# Patient Record
Sex: FEMALE | Race: WHITE | NOT HISPANIC OR LATINO | Employment: UNEMPLOYED | URBAN - NONMETROPOLITAN AREA
[De-identification: names, ages, dates, MRNs, and addresses within clinical notes are randomized per-mention and may not be internally consistent; named-entity substitution may affect disease eponyms.]

---

## 2023-05-22 ENCOUNTER — TELEPHONE (OUTPATIENT)
Dept: PRIMARY CARE | Facility: CLINIC | Age: 68
End: 2023-05-22
Payer: MEDICARE

## 2023-05-22 NOTE — TELEPHONE ENCOUNTER
I would like to do a same day sick visit with her if possible, tell her to please call the office at 9am tomorrow and we can try to get her in with a SDS please.

## 2023-05-22 NOTE — TELEPHONE ENCOUNTER
Alana called with a complaint of stomach pain, she stated that her pain level is at a 4. This started on Friday. She's wondering if you can order tests to be done to make sure everything is okay.   She is scheduled in early July to be seen.

## 2023-05-23 ENCOUNTER — OFFICE VISIT (OUTPATIENT)
Dept: PRIMARY CARE | Facility: CLINIC | Age: 68
End: 2023-05-23
Payer: MEDICARE

## 2023-05-23 VITALS
WEIGHT: 226.2 LBS | OXYGEN SATURATION: 98 % | SYSTOLIC BLOOD PRESSURE: 107 MMHG | HEIGHT: 69 IN | HEART RATE: 89 BPM | BODY MASS INDEX: 33.5 KG/M2 | DIASTOLIC BLOOD PRESSURE: 73 MMHG

## 2023-05-23 DIAGNOSIS — R10.11 RUQ PAIN: ICD-10-CM

## 2023-05-23 DIAGNOSIS — R10.84 GENERALIZED ABDOMINAL PAIN: Primary | ICD-10-CM

## 2023-05-23 PROCEDURE — 1159F MED LIST DOCD IN RCRD: CPT | Performed by: REGISTERED NURSE

## 2023-05-23 PROCEDURE — 3008F BODY MASS INDEX DOCD: CPT | Performed by: REGISTERED NURSE

## 2023-05-23 PROCEDURE — 1160F RVW MEDS BY RX/DR IN RCRD: CPT | Performed by: REGISTERED NURSE

## 2023-05-23 PROCEDURE — 1036F TOBACCO NON-USER: CPT | Performed by: REGISTERED NURSE

## 2023-05-23 PROCEDURE — 99213 OFFICE O/P EST LOW 20 MIN: CPT | Performed by: REGISTERED NURSE

## 2023-05-23 RX ORDER — ATORVASTATIN CALCIUM 20 MG/1
20 TABLET, FILM COATED ORAL DAILY
COMMUNITY
End: 2023-06-30

## 2023-05-23 RX ORDER — OXYCODONE HYDROCHLORIDE 5 MG/1
5 TABLET ORAL EVERY 6 HOURS
COMMUNITY
Start: 2023-01-09 | End: 2023-05-23 | Stop reason: ALTCHOICE

## 2023-05-23 RX ORDER — MELOXICAM 15 MG/1
1 TABLET ORAL DAILY PRN
COMMUNITY
Start: 2022-09-23 | End: 2023-09-25 | Stop reason: ALTCHOICE

## 2023-05-23 RX ORDER — OXYCODONE AND ACETAMINOPHEN 5; 325 MG/1; MG/1
1 TABLET ORAL EVERY 6 HOURS PRN
COMMUNITY
Start: 2023-01-30 | End: 2023-05-23 | Stop reason: ALTCHOICE

## 2023-05-23 RX ORDER — CYCLOBENZAPRINE HCL 10 MG
TABLET ORAL
COMMUNITY
Start: 2022-12-21 | End: 2023-05-23 | Stop reason: SDUPTHER

## 2023-05-23 RX ORDER — OMEPRAZOLE 40 MG/1
40 CAPSULE, DELAYED RELEASE ORAL DAILY
COMMUNITY
End: 2023-09-25 | Stop reason: SDUPTHER

## 2023-05-23 RX ORDER — LISINOPRIL 20 MG/1
20 TABLET ORAL DAILY
COMMUNITY
End: 2023-06-30

## 2023-05-23 RX ORDER — VIT C/E/ZN/COPPR/LUTEIN/ZEAXAN 250MG-90MG
CAPSULE ORAL
COMMUNITY
End: 2023-05-23 | Stop reason: ALTCHOICE

## 2023-05-23 RX ORDER — LANOLIN ALCOHOL/MO/W.PET/CERES
1 CREAM (GRAM) TOPICAL DAILY
COMMUNITY

## 2023-05-23 RX ORDER — IBUPROFEN 200 MG
TABLET ORAL EVERY 6 HOURS PRN
COMMUNITY

## 2023-05-23 RX ORDER — PHENYLEPHRINE HCL 1 %
DROPS NASAL
COMMUNITY
Start: 2023-04-19

## 2023-05-23 RX ORDER — CYCLOBENZAPRINE HCL 5 MG
5 TABLET ORAL 3 TIMES DAILY PRN
COMMUNITY
Start: 2023-01-27 | End: 2023-09-25 | Stop reason: ALTCHOICE

## 2023-05-23 ASSESSMENT — ENCOUNTER SYMPTOMS: ABDOMINAL PAIN: 1

## 2023-05-23 NOTE — PROGRESS NOTES
"Subjective   Patient ID: Alana Ashby is a 67 y.o. female who presents for Abdominal Pain (Pt presents today for abdominal pain; pain started on 5/19, not constant, comes and go; at this time, she states her pain is at a 5; family hx of pancreatitis cancer;  ).    Abdominal Pain       Abdominal Pain: Started on Friday, comes and goes. Intermittent, Pain is 5/10. Family history of pancreatic cancer, she has had her gallbladder removed. Denies issues with BM.   In the morning she does not feel it, during the day it gets worse. She is having upper Right side. She had gallbladder removed here. Mother and brother both passed of pancreatic cancer. Denies pancreatitis. Denies n/v/c/d. She has been eating and drinking okay. Denies fevers or chills. Denies blood in stool.   Has tried tylenol, limiting ibuprofen once in a while. Using heat on back. Tylenol is helping at night.     Review of Systems   Gastrointestinal:  Positive for abdominal pain.     Objective   /73 (BP Location: Right arm, Patient Position: Sitting, BP Cuff Size: Large adult)   Pulse 89   Ht 1.753 m (5' 9\")   Wt 103 kg (226 lb 3.2 oz)   SpO2 98%   BMI 33.40 kg/m²     Physical Exam  Abdominal:      General: Abdomen is flat. Bowel sounds are normal. There is no distension.      Palpations: Abdomen is soft.      Tenderness: There is abdominal tenderness in the right upper quadrant. There is no guarding or rebound. Negative signs include McBurney's sign.         Assessment/Plan        #Abdominal pain   #RUQ pain   Blood work ordered   Ultrasound abdomen   Could be hernia   R/o pancreas issues   Call with worsening symptoms   Will call with results   Continue supportive care     "

## 2023-05-26 ENCOUNTER — LAB (OUTPATIENT)
Dept: LAB | Facility: LAB | Age: 68
End: 2023-05-26
Payer: MEDICARE

## 2023-05-26 DIAGNOSIS — R10.11 RUQ PAIN: ICD-10-CM

## 2023-05-26 DIAGNOSIS — R10.84 GENERALIZED ABDOMINAL PAIN: ICD-10-CM

## 2023-05-26 LAB
ALANINE AMINOTRANSFERASE (SGPT) (U/L) IN SER/PLAS: 29 U/L (ref 7–45)
ALBUMIN (G/DL) IN SER/PLAS: 4.4 G/DL (ref 3.4–5)
ALKALINE PHOSPHATASE (U/L) IN SER/PLAS: 65 U/L (ref 33–136)
AMYLASE (U/L) IN SER/PLAS: 47 U/L (ref 29–103)
ANION GAP IN SER/PLAS: 11 MMOL/L (ref 10–20)
ASPARTATE AMINOTRANSFERASE (SGOT) (U/L) IN SER/PLAS: 27 U/L (ref 9–39)
BASOPHILS (10*3/UL) IN BLOOD BY AUTOMATED COUNT: 0.03 X10E9/L (ref 0–0.1)
BASOPHILS/100 LEUKOCYTES IN BLOOD BY AUTOMATED COUNT: 0.6 % (ref 0–2)
BILIRUBIN TOTAL (MG/DL) IN SER/PLAS: 0.6 MG/DL (ref 0–1.2)
CALCIUM (MG/DL) IN SER/PLAS: 9.4 MG/DL (ref 8.6–10.3)
CARBON DIOXIDE, TOTAL (MMOL/L) IN SER/PLAS: 31 MMOL/L (ref 21–32)
CHLORIDE (MMOL/L) IN SER/PLAS: 102 MMOL/L (ref 98–107)
CREATININE (MG/DL) IN SER/PLAS: 1.12 MG/DL (ref 0.5–1.05)
EOSINOPHILS (10*3/UL) IN BLOOD BY AUTOMATED COUNT: 0.12 X10E9/L (ref 0–0.7)
EOSINOPHILS/100 LEUKOCYTES IN BLOOD BY AUTOMATED COUNT: 2.3 % (ref 0–6)
ERYTHROCYTE DISTRIBUTION WIDTH (RATIO) BY AUTOMATED COUNT: 13 % (ref 11.5–14.5)
ERYTHROCYTE MEAN CORPUSCULAR HEMOGLOBIN CONCENTRATION (G/DL) BY AUTOMATED: 33.4 G/DL (ref 32–36)
ERYTHROCYTE MEAN CORPUSCULAR VOLUME (FL) BY AUTOMATED COUNT: 95 FL (ref 80–100)
ERYTHROCYTES (10*6/UL) IN BLOOD BY AUTOMATED COUNT: 4.41 X10E12/L (ref 4–5.2)
GFR FEMALE: 54 ML/MIN/1.73M2
GLUCOSE (MG/DL) IN SER/PLAS: 102 MG/DL (ref 74–99)
HEMATOCRIT (%) IN BLOOD BY AUTOMATED COUNT: 41.9 % (ref 36–46)
HEMOGLOBIN (G/DL) IN BLOOD: 14 G/DL (ref 12–16)
IMMATURE GRANULOCYTES/100 LEUKOCYTES IN BLOOD BY AUTOMATED COUNT: 0.2 % (ref 0–0.9)
LEUKOCYTES (10*3/UL) IN BLOOD BY AUTOMATED COUNT: 5.3 X10E9/L (ref 4.4–11.3)
LIPASE (U/L) IN SER/PLAS: 72 U/L (ref 9–82)
LYMPHOCYTES (10*3/UL) IN BLOOD BY AUTOMATED COUNT: 1.81 X10E9/L (ref 1.2–4.8)
LYMPHOCYTES/100 LEUKOCYTES IN BLOOD BY AUTOMATED COUNT: 34.2 % (ref 13–44)
MONOCYTES (10*3/UL) IN BLOOD BY AUTOMATED COUNT: 0.43 X10E9/L (ref 0.1–1)
MONOCYTES/100 LEUKOCYTES IN BLOOD BY AUTOMATED COUNT: 8.1 % (ref 2–10)
NEUTROPHILS (10*3/UL) IN BLOOD BY AUTOMATED COUNT: 2.89 X10E9/L (ref 1.2–7.7)
NEUTROPHILS/100 LEUKOCYTES IN BLOOD BY AUTOMATED COUNT: 54.6 % (ref 40–80)
PLATELETS (10*3/UL) IN BLOOD AUTOMATED COUNT: 191 X10E9/L (ref 150–450)
POTASSIUM (MMOL/L) IN SER/PLAS: 4.3 MMOL/L (ref 3.5–5.3)
PROTEIN TOTAL: 6.9 G/DL (ref 6.4–8.2)
SODIUM (MMOL/L) IN SER/PLAS: 140 MMOL/L (ref 136–145)
UREA NITROGEN (MG/DL) IN SER/PLAS: 16 MG/DL (ref 6–23)

## 2023-05-26 PROCEDURE — 82150 ASSAY OF AMYLASE: CPT

## 2023-05-26 PROCEDURE — 83690 ASSAY OF LIPASE: CPT

## 2023-05-26 PROCEDURE — 80053 COMPREHEN METABOLIC PANEL: CPT

## 2023-05-26 PROCEDURE — 85025 COMPLETE CBC W/AUTO DIFF WBC: CPT

## 2023-05-26 PROCEDURE — 36415 COLL VENOUS BLD VENIPUNCTURE: CPT

## 2023-05-30 ENCOUNTER — TELEPHONE (OUTPATIENT)
Dept: PRIMARY CARE | Facility: CLINIC | Age: 68
End: 2023-05-30
Payer: MEDICARE

## 2023-05-30 DIAGNOSIS — R10.9 ABDOMINAL DISCOMFORT: ICD-10-CM

## 2023-05-30 NOTE — TELEPHONE ENCOUNTER
Her ultrasound was normal, she did have some gas that shadowed over the pancrease but otherwise there was nothing else noted. Her blood work was also normal. In my notes I stated it could be a hernia, this could be the cause of her discomfort or it could be hiatal hernia. An EGD would be needed to check for that if she is interested

## 2023-05-30 NOTE — TELEPHONE ENCOUNTER
Alana would like to have EGD done. Referral placed for GI and number given for Kathy Castellanos.

## 2023-05-30 NOTE — TELEPHONE ENCOUNTER
Patient called for US and lab results. Patient requested detailed message if she is unable to answer phone.

## 2023-06-30 DIAGNOSIS — E78.49 OTHER HYPERLIPIDEMIA: ICD-10-CM

## 2023-06-30 DIAGNOSIS — I10 PRIMARY HYPERTENSION: ICD-10-CM

## 2023-06-30 PROBLEM — I49.9 IRREGULAR HEART BEAT: Status: RESOLVED | Noted: 2023-06-30 | Resolved: 2023-06-30

## 2023-06-30 PROBLEM — E78.5 HYPERLIPIDEMIA: Status: ACTIVE | Noted: 2023-06-30

## 2023-06-30 PROBLEM — M47.816 FACET SYNDROME, LUMBAR: Status: ACTIVE | Noted: 2023-06-30

## 2023-06-30 PROBLEM — M53.3 COCCYDYNIA: Status: ACTIVE | Noted: 2023-06-30

## 2023-06-30 PROBLEM — M54.16 LUMBAR RADICULOPATHY: Status: ACTIVE | Noted: 2023-06-30

## 2023-06-30 PROBLEM — M25.569 KNEE PAIN: Status: RESOLVED | Noted: 2023-06-30 | Resolved: 2023-06-30

## 2023-06-30 PROBLEM — M79.646 THUMB PAIN: Status: RESOLVED | Noted: 2023-06-30 | Resolved: 2023-06-30

## 2023-06-30 PROBLEM — R59.1 LYMPHADENOPATHY: Status: ACTIVE | Noted: 2023-06-30

## 2023-06-30 PROBLEM — R10.13 EPIGASTRIC PAIN: Status: RESOLVED | Noted: 2023-06-30 | Resolved: 2023-06-30

## 2023-06-30 PROBLEM — I63.9 OCCIPITAL INFARCTION (MULTI): Status: ACTIVE | Noted: 2023-06-30

## 2023-06-30 PROBLEM — M51.36 NARROWING OF LUMBAR INTERVERTEBRAL DISC SPACE: Status: ACTIVE | Noted: 2023-06-30

## 2023-06-30 PROBLEM — M25.529 ELBOW PAIN: Status: RESOLVED | Noted: 2023-06-30 | Resolved: 2023-06-30

## 2023-06-30 PROBLEM — M43.10 ACQUIRED SPONDYLOLISTHESIS: Status: ACTIVE | Noted: 2023-06-30

## 2023-06-30 PROBLEM — M51.369 NARROWING OF LUMBAR INTERVERTEBRAL DISC SPACE: Status: ACTIVE | Noted: 2023-06-30

## 2023-06-30 PROBLEM — S69.90XA THUMB INJURY: Status: RESOLVED | Noted: 2023-06-30 | Resolved: 2023-06-30

## 2023-06-30 PROBLEM — E55.9 VITAMIN D DEFICIENCY: Status: ACTIVE | Noted: 2023-06-30

## 2023-06-30 PROBLEM — K44.9 HIATAL HERNIA: Status: ACTIVE | Noted: 2023-06-30

## 2023-06-30 PROBLEM — G93.9 BRAIN LESION: Status: ACTIVE | Noted: 2023-06-30

## 2023-06-30 PROBLEM — M48.061 LUMBAR STENOSIS: Status: ACTIVE | Noted: 2023-06-30

## 2023-06-30 PROBLEM — M79.671 RIGHT FOOT PAIN: Status: ACTIVE | Noted: 2023-06-30

## 2023-06-30 PROBLEM — K29.70 IRRITANT GASTRITIS: Status: RESOLVED | Noted: 2023-06-30 | Resolved: 2023-06-30

## 2023-06-30 PROBLEM — R19.00 ABDOMINAL MASS: Status: RESOLVED | Noted: 2023-06-30 | Resolved: 2023-06-30

## 2023-06-30 PROBLEM — K57.30 DIVERTICULOSIS OF COLON: Status: ACTIVE | Noted: 2023-06-30

## 2023-06-30 PROBLEM — Z98.1 STATUS POST LUMBAR SPINAL FUSION: Status: ACTIVE | Noted: 2023-06-30

## 2023-06-30 PROBLEM — R13.14 PHARYNGOESOPHAGEAL DYSPHAGIA: Status: ACTIVE | Noted: 2023-06-30

## 2023-06-30 PROBLEM — N63.0 BREAST MASS: Status: ACTIVE | Noted: 2023-06-30

## 2023-06-30 PROBLEM — K21.9 GERD (GASTROESOPHAGEAL REFLUX DISEASE): Status: ACTIVE | Noted: 2023-06-30

## 2023-06-30 PROBLEM — M47.816 SPONDYLOSIS OF LUMBAR SPINE: Status: ACTIVE | Noted: 2023-06-30

## 2023-06-30 RX ORDER — ATORVASTATIN CALCIUM 20 MG/1
TABLET, FILM COATED ORAL
Qty: 90 TABLET | Refills: 0 | Status: SHIPPED | OUTPATIENT
Start: 2023-06-30 | End: 2023-09-25 | Stop reason: SDUPTHER

## 2023-06-30 RX ORDER — LISINOPRIL 20 MG/1
TABLET ORAL
Qty: 90 TABLET | Refills: 0 | Status: SHIPPED | OUTPATIENT
Start: 2023-06-30 | End: 2023-09-25 | Stop reason: SDUPTHER

## 2023-07-10 ENCOUNTER — APPOINTMENT (OUTPATIENT)
Dept: PRIMARY CARE | Facility: CLINIC | Age: 68
End: 2023-07-10
Payer: MEDICARE

## 2023-09-18 PROBLEM — M53.3 COCCYDYNIA: Status: RESOLVED | Noted: 2023-06-30 | Resolved: 2023-09-18

## 2023-09-18 PROBLEM — N63.0 BREAST MASS: Status: RESOLVED | Noted: 2023-06-30 | Resolved: 2023-09-18

## 2023-09-18 PROBLEM — M47.816 SPONDYLOSIS OF LUMBAR SPINE: Status: RESOLVED | Noted: 2023-06-30 | Resolved: 2023-09-18

## 2023-09-18 PROBLEM — Z98.1 STATUS POST LUMBAR SPINAL FUSION: Status: RESOLVED | Noted: 2023-06-30 | Resolved: 2023-09-18

## 2023-09-18 PROBLEM — M79.671 RIGHT FOOT PAIN: Status: RESOLVED | Noted: 2023-06-30 | Resolved: 2023-09-18

## 2023-09-18 PROBLEM — M48.061 LUMBAR STENOSIS: Status: RESOLVED | Noted: 2023-06-30 | Resolved: 2023-09-18

## 2023-09-18 PROBLEM — M54.16 LUMBAR RADICULOPATHY: Status: RESOLVED | Noted: 2023-06-30 | Resolved: 2023-09-18

## 2023-09-18 NOTE — PROGRESS NOTES
"Alana Ashby is a 67 y.o. female who presents for Medicare Annual Wellness Visit Subsequent (Had back surgery 12/21/22; declining flu shot)    Thumb pain: better lately, never saw ortho about injections. Notes her pain fluctuates with activity level.      Low back pain: She had surgery in Dec 2022, her pain changed but in some ways it is worse. She followed up in June and they have recommended physical therapy and she is doing this at home, as directed by her physical therapist. She has followup in December, she is planning to call to schedule now. She is just taking tylenol to help with nighttime symptoms. She had brain fog on gabapentin. Doesn't recall trying the lyrica.      Epigastric pain: Positional. She has had it for a few years but it has gotten worse in the last 6 mo. Comes and goes. When it comes it doesn't last long. Sometimes she feels a bulge and doesn't feel much discomfort.      HTN: On lisinopril, no SE's.     GERD: Controlled on omeprazole. SHe only takes it prn at this point.      HLD: On simvastatin, she has been out recently.      All other systems have been reviewed and are negative for complaint        Objective   /78 (BP Location: Left arm, Patient Position: Sitting, BP Cuff Size: Adult)   Pulse 79   Ht 1.753 m (5' 9\")   Wt 100 kg (221 lb)   BMI 32.64 kg/m²     Gen: No acute distress, alert and oriented x3, pleasant   HEENT: moist mucous membranes, b/l external auditory canals are clear of debris, TMs within normal limits, no oropharyngeal lesions, eomi, perrla   Neck: thyroid within normal limits, no lymphadenopathy   CV: RRR, normal S1/S2, no murmur   Resp: Clear to auscultation bilaterally, no wheezes or rhonchi appreciated  Abd: soft, nontender, non-distended, no guarding/rigidity, bowel sounds present  Extr: no edema, no calf tenderness  Derm: Skin is warm and dry, no rashes appreciated  Psych: mood is good, affect is congruent, good hygiene, normal speech and eye " contact  Neuro: cranial nerves grossly intact, normal gait    Assessment/Plan     #Thumb pain  check xray  rx sent medrol pack  if no improvement will try meloxicam  if that doesn't help we will set up for injections     #Low back pain:  previously through pain management with injections, diminishing returns  Now more seriously considering surgery  Ordering MRI today, she will try open  We discussed ativan before hand, she will discuss with her      #Epigastric pain  likely ventral hernia  discussed supportive care     #CKD:  Monitoring     #HTN  Controlled on lisinopril     #HLD  Controlled on simvastatin  ordering lipid     #GERD:  on omeprazole prn     HCM:  Mammogram Oct WNL  No need for pap smear due to hysterectomy  C-scope 2020, next due 2025  Declining flu, PNA vaccine

## 2023-09-25 ENCOUNTER — OFFICE VISIT (OUTPATIENT)
Dept: PRIMARY CARE | Facility: CLINIC | Age: 68
End: 2023-09-25
Payer: MEDICARE

## 2023-09-25 VITALS
HEIGHT: 69 IN | BODY MASS INDEX: 32.73 KG/M2 | DIASTOLIC BLOOD PRESSURE: 78 MMHG | HEART RATE: 79 BPM | SYSTOLIC BLOOD PRESSURE: 119 MMHG | WEIGHT: 221 LBS

## 2023-09-25 DIAGNOSIS — I10 PRIMARY HYPERTENSION: ICD-10-CM

## 2023-09-25 DIAGNOSIS — Z12.31 ENCOUNTER FOR SCREENING MAMMOGRAM FOR MALIGNANT NEOPLASM OF BREAST: ICD-10-CM

## 2023-09-25 DIAGNOSIS — E78.49 OTHER HYPERLIPIDEMIA: ICD-10-CM

## 2023-09-25 DIAGNOSIS — K21.9 GASTROESOPHAGEAL REFLUX DISEASE WITHOUT ESOPHAGITIS: Primary | ICD-10-CM

## 2023-09-25 PROCEDURE — 1036F TOBACCO NON-USER: CPT | Performed by: FAMILY MEDICINE

## 2023-09-25 PROCEDURE — 3008F BODY MASS INDEX DOCD: CPT | Performed by: FAMILY MEDICINE

## 2023-09-25 PROCEDURE — 1170F FXNL STATUS ASSESSED: CPT | Performed by: FAMILY MEDICINE

## 2023-09-25 PROCEDURE — G0439 PPPS, SUBSEQ VISIT: HCPCS | Performed by: FAMILY MEDICINE

## 2023-09-25 PROCEDURE — 1159F MED LIST DOCD IN RCRD: CPT | Performed by: FAMILY MEDICINE

## 2023-09-25 PROCEDURE — 1160F RVW MEDS BY RX/DR IN RCRD: CPT | Performed by: FAMILY MEDICINE

## 2023-09-25 PROCEDURE — 3074F SYST BP LT 130 MM HG: CPT | Performed by: FAMILY MEDICINE

## 2023-09-25 PROCEDURE — 3078F DIAST BP <80 MM HG: CPT | Performed by: FAMILY MEDICINE

## 2023-09-25 RX ORDER — ATORVASTATIN CALCIUM 20 MG/1
20 TABLET, FILM COATED ORAL DAILY
Qty: 90 TABLET | Refills: 3 | Status: SHIPPED | OUTPATIENT
Start: 2023-09-25 | End: 2024-09-24

## 2023-09-25 RX ORDER — OMEPRAZOLE 40 MG/1
40 CAPSULE, DELAYED RELEASE ORAL
Qty: 90 CAPSULE | Refills: 3 | Status: SHIPPED | OUTPATIENT
Start: 2023-09-25 | End: 2024-09-24

## 2023-09-25 RX ORDER — LISINOPRIL 20 MG/1
20 TABLET ORAL DAILY
Qty: 90 TABLET | Refills: 3 | Status: SHIPPED | OUTPATIENT
Start: 2023-09-25 | End: 2024-09-24

## 2023-09-25 ASSESSMENT — PATIENT HEALTH QUESTIONNAIRE - PHQ9
SUM OF ALL RESPONSES TO PHQ9 QUESTIONS 1 AND 2: 0
2. FEELING DOWN, DEPRESSED OR HOPELESS: NOT AT ALL
1. LITTLE INTEREST OR PLEASURE IN DOING THINGS: NOT AT ALL

## 2023-09-25 ASSESSMENT — ACTIVITIES OF DAILY LIVING (ADL)
DOING_HOUSEWORK: INDEPENDENT
TAKING_MEDICATION: INDEPENDENT
GROCERY_SHOPPING: INDEPENDENT
MANAGING_FINANCES: INDEPENDENT
DRESSING: INDEPENDENT
BATHING: INDEPENDENT

## 2023-12-06 ENCOUNTER — TRANSCRIBE ORDERS (OUTPATIENT)
Dept: ORTHOPEDIC SURGERY | Facility: HOSPITAL | Age: 68
End: 2023-12-06
Payer: MEDICARE

## 2023-12-06 DIAGNOSIS — M51.36 NARROWING OF LUMBAR INTERVERTEBRAL DISC SPACE: ICD-10-CM

## 2023-12-08 ENCOUNTER — ANCILLARY PROCEDURE (OUTPATIENT)
Dept: RADIOLOGY | Facility: CLINIC | Age: 68
End: 2023-12-08
Payer: MEDICARE

## 2023-12-08 ENCOUNTER — OFFICE VISIT (OUTPATIENT)
Dept: ORTHOPEDIC SURGERY | Facility: CLINIC | Age: 68
End: 2023-12-08
Payer: MEDICARE

## 2023-12-08 DIAGNOSIS — Z98.1 STATUS POST LUMBAR SPINAL FUSION: Primary | ICD-10-CM

## 2023-12-08 DIAGNOSIS — M51.36 NARROWING OF LUMBAR INTERVERTEBRAL DISC SPACE: ICD-10-CM

## 2023-12-08 PROCEDURE — 3008F BODY MASS INDEX DOCD: CPT | Performed by: ORTHOPAEDIC SURGERY

## 2023-12-08 PROCEDURE — 1125F AMNT PAIN NOTED PAIN PRSNT: CPT | Performed by: ORTHOPAEDIC SURGERY

## 2023-12-08 PROCEDURE — 1160F RVW MEDS BY RX/DR IN RCRD: CPT | Performed by: ORTHOPAEDIC SURGERY

## 2023-12-08 PROCEDURE — 72110 X-RAY EXAM L-2 SPINE 4/>VWS: CPT

## 2023-12-08 PROCEDURE — 1159F MED LIST DOCD IN RCRD: CPT | Performed by: ORTHOPAEDIC SURGERY

## 2023-12-08 PROCEDURE — 1036F TOBACCO NON-USER: CPT | Performed by: ORTHOPAEDIC SURGERY

## 2023-12-08 PROCEDURE — 99213 OFFICE O/P EST LOW 20 MIN: CPT | Performed by: ORTHOPAEDIC SURGERY

## 2023-12-08 ASSESSMENT — PAIN - FUNCTIONAL ASSESSMENT: PAIN_FUNCTIONAL_ASSESSMENT: 0-10

## 2023-12-08 ASSESSMENT — PAIN SCALES - GENERAL: PAINLEVEL_OUTOF10: 8

## 2023-12-08 ASSESSMENT — PAIN DESCRIPTION - DESCRIPTORS: DESCRIPTORS: ACHING;SHARP;SHOOTING

## 2023-12-08 NOTE — LETTER
December 11, 2023     Eulalia Greene DO  [address] [facility] [address] [facility]  [place]  [facility] [id number]    Patient: Alana Ashby   YOB: 1955   Date of Visit: 12/8/2023       Dear Dr. Eulalia Greene DO:    Thank you for referring Alana Ashby to me for evaluation. Below are my notes for this consultation.  If you have questions, please do not hesitate to call me. I look forward to following your patient along with you.       Sincerely,     Raul Clements MD      CC: No Recipients  ______________________________________________________________________________________    HPI:Alana Ashby is a 68-year-old woman who comes in 1 year status post her L4-S1 TLIF.  Overall she is doing well.  She does have some axial back pain with activity.  However, she is walking well, and has no radicular or claudication symptoms.      ROS:  Reviewed on EMR and patient intake sheet.    PMH/SH:  Reviewed on EMR and patient intake sheet.    Exam:  Physical Exam    Constitutional: Well appearing; no acute distress  Eyes: pupils are equal and round  Psych: normal affect  Respiratory: non-labored breathing  Cardiovascular: regular rate and rhythm  GI: non-distended abdomen  Musculoskeletal: no pain with range of motion of the hips bilaterally  Neurologic: [4]/5 strength in the lower extremities bilaterally]; [-] straight leg raise; no clonus; negative babinski    Radiology:     X-rays demonstrate satisfactory instrumentation and fusion.  No significant adjacent segment changes    Diagnosis:    Status post lumbar fusion    Assessment and Plan:   68-year-old woman 1 year status post lumbar fusion.  Overall she is doing very well.  At this time I can see her back as needed.  She is grateful for her care.    The patient was in agreement with the plan. At the end of the visit today, the patient felt that all questions had been answered satisfactorily.  The patient was pleased with the visit and very appreciative for the care rendered.     Thank you  very much for the kind referral.  It is a privilege, and a pleasure, to partner with you in the care of your patients.  I would be delighted to assist you with any further consultations as needed.  Please feel free to have your patients refer to my website, [email or web address], for patient education materials regarding treatment options for common spinal conditions.        Raul Cleemnts MD    Chief of Spine Surgery, [facility]  Director of Spine Service, [facility]  , Department of Orthopaedics  [employer]  [address] [place] [address]  [email or web address]  P: [phone]    This note was dictated with voice recognition software.  It has not been proofread for grammatical errors, typographical mistakes or other semantic inconsistencies.

## 2023-12-11 NOTE — PROGRESS NOTES
HPI:Alana Ashby is a 68-year-old woman who comes in 1 year status post her L4-S1 TLIF.  Overall she is doing well.  She does have some axial back pain with activity.  However, she is walking well, and has no radicular or claudication symptoms.      ROS:  Reviewed on EMR and patient intake sheet.    PMH/SH:  Reviewed on EMR and patient intake sheet.    Exam:  Physical Exam    Constitutional: Well appearing; no acute distress  Eyes: pupils are equal and round  Psych: normal affect  Respiratory: non-labored breathing  Cardiovascular: regular rate and rhythm  GI: non-distended abdomen  Musculoskeletal: no pain with range of motion of the hips bilaterally  Neurologic: [4]/5 strength in the lower extremities bilaterally]; [-] straight leg raise; no clonus; negative babinski    Radiology:     X-rays demonstrate satisfactory instrumentation and fusion.  No significant adjacent segment changes    Diagnosis:    Status post lumbar fusion    Assessment and Plan:   68-year-old woman 1 year status post lumbar fusion.  Overall she is doing very well.  At this time I can see her back as needed.  She is grateful for her care.    The patient was in agreement with the plan. At the end of the visit today, the patient felt that all questions had been answered satisfactorily.  The patient was pleased with the visit and very appreciative for the care rendered.     Thank you very much for the kind referral.  It is a privilege, and a pleasure, to partner with you in the care of your patients.  I would be delighted to assist you with any further consultations as needed.  Please feel free to have your patients refer to my website, [email or web address], for patient education materials regarding treatment options for common spinal conditions.        Raul Clements MD    Chief of Spine Surgery, [facility]  Director of Spine Service, [facility]  , Department  of Orthopaedics  [employer]  [address]  [place] [id number]  [email or web address]  P: [phone]    This note was dictated with voice recognition software.  It has not been proofread for grammatical errors, typographical mistakes or other semantic inconsistencies.

## 2023-12-22 ENCOUNTER — APPOINTMENT (OUTPATIENT)
Dept: ORTHOPEDIC SURGERY | Facility: CLINIC | Age: 68
End: 2023-12-22
Payer: MEDICARE

## 2024-03-28 ENCOUNTER — OFFICE VISIT (OUTPATIENT)
Dept: PRIMARY CARE | Facility: CLINIC | Age: 69
End: 2024-03-28
Payer: MEDICARE

## 2024-03-28 VITALS
HEIGHT: 69 IN | BODY MASS INDEX: 34.21 KG/M2 | HEART RATE: 91 BPM | TEMPERATURE: 98.6 F | WEIGHT: 231 LBS | SYSTOLIC BLOOD PRESSURE: 123 MMHG | DIASTOLIC BLOOD PRESSURE: 83 MMHG

## 2024-03-28 DIAGNOSIS — J01.90 ACUTE SINUSITIS, RECURRENCE NOT SPECIFIED, UNSPECIFIED LOCATION: Primary | ICD-10-CM

## 2024-03-28 PROCEDURE — 3074F SYST BP LT 130 MM HG: CPT | Performed by: FAMILY MEDICINE

## 2024-03-28 PROCEDURE — 3079F DIAST BP 80-89 MM HG: CPT | Performed by: FAMILY MEDICINE

## 2024-03-28 PROCEDURE — 99214 OFFICE O/P EST MOD 30 MIN: CPT | Performed by: FAMILY MEDICINE

## 2024-03-28 PROCEDURE — 1036F TOBACCO NON-USER: CPT | Performed by: FAMILY MEDICINE

## 2024-03-28 PROCEDURE — 1159F MED LIST DOCD IN RCRD: CPT | Performed by: FAMILY MEDICINE

## 2024-03-28 RX ORDER — CEPHALEXIN 500 MG/1
500 CAPSULE ORAL 2 TIMES DAILY
Qty: 20 CAPSULE | Refills: 0 | Status: SHIPPED | OUTPATIENT
Start: 2024-03-28 | End: 2024-04-07

## 2024-03-28 RX ORDER — PREDNISONE 20 MG/1
40 TABLET ORAL DAILY
Qty: 10 TABLET | Refills: 0 | Status: SHIPPED | OUTPATIENT
Start: 2024-03-28 | End: 2024-04-02

## 2024-03-28 NOTE — PATIENT INSTRUCTIONS
Spine:  Chaim Jimeenz (- Neuro-spine Surgery) [phone]   Evens Vargas ([facility]) [phone]

## 2024-03-28 NOTE — PROGRESS NOTES
"Alana Ashby is a 68 y.o. female who presents for Sick Visit (Has had a \"cold\" for 2 weeks, states she's had laryngitis for over a week and lost her voice, scratchiness in throat wont go away)    Sore throat: Laryngitis off and on. Ears plugged. +rhinorrhea, +PND. Ear plugging, no pain. Mild \"hacky' cough, just started bringing up mucous today, green in color. No vomiting or diarrhea. She has been taking tylenol and ibuprofen for her pain. Using tea and honey.     Thumb pain: better lately, never saw ortho about injections. Notes her pain fluctuates with activity level.      Low back pain: She had surgery in Dec 2022, her pain changed but in some ways it is worse. She followed up in June and they have recommended physical therapy and she is doing this at home, as directed by her physical therapist. She has followup in December, she is planning to call to schedule now. She is just taking tylenol to help with nighttime symptoms. She had brain fog on gabapentin. Doesn't recall trying the lyrica.      Epigastric pain: Positional. She has had it for a few years but it has gotten worse in the last 6 mo. Comes and goes. When it comes it doesn't last long. Sometimes she feels a bulge and doesn't feel much discomfort.      HTN: On lisinopril, no SE's.     GERD: Controlled on omeprazole. SHe only takes it prn at this point.      HLD: On simvastatin, she has been out recently.      All other systems have been reviewed and are negative for complaint     Objective   /83 (BP Location: Left arm, Patient Position: Sitting, BP Cuff Size: Large adult)   Pulse 91   Temp 37 °C (98.6 °F)   Ht 1.753 m (5' 9\")   Wt 105 kg (231 lb)   BMI 34.11 kg/m²     Gen: No acute distress, alert and oriented x3, pleasant   HEENT: moist mucous membranes, b/l external auditory canals are clear of debris, TMs within normal limits, no oropharyngeal lesions, eomi, perrla, +right TM effusion  Neck: thyroid within normal limits, no lymphadenopathy "   CV: RRR, normal S1/S2, no murmur   Resp: Clear to auscultation bilaterally, no wheezes or rhonchi appreciated  Abd: soft, nontender, non-distended, no guarding/rigidity, bowel sounds present  Extr: no edema, no calf tenderness  Derm: Skin is warm and dry, no rashes appreciated  Psych: mood is good, affect is congruent, good hygiene, normal speech and eye contact  Neuro: cranial nerves grossly intact, normal gait    Assessment/Plan     #Acute sinusitis  Rx sent keflex  Fluids, rest, tylenol prn  Rx sent prednisone burst  Recommended vocal rest    #Thumb pain  check xray  rx sent medrol pack  if no improvement will try meloxicam  if that doesn't help we will set up for injections     #Low back pain:  previously through pain management with injections, diminishing returns  Now more seriously considering surgery  Ordering MRI today, she will try open  We discussed ativan before hand, she will discuss with her      #Epigastric pain  likely ventral hernia  discussed supportive care     #CKD:  Monitoring     #HTN  Controlled on lisinopril     #HLD  Controlled on simvastatin  ordering lipid     #GERD:  on omeprazole prn     HCM:  Mammogram Oct WNL  No need for pap smear due to hysterectomy  C-scope 2020, next due 2025  Declining flu, PNA vaccine

## 2024-05-20 ENCOUNTER — OFFICE VISIT (OUTPATIENT)
Dept: PAIN MEDICINE | Facility: CLINIC | Age: 69
End: 2024-05-20
Payer: MEDICARE

## 2024-05-20 VITALS
HEART RATE: 74 BPM | HEIGHT: 69 IN | RESPIRATION RATE: 20 BRPM | WEIGHT: 231 LBS | DIASTOLIC BLOOD PRESSURE: 79 MMHG | SYSTOLIC BLOOD PRESSURE: 116 MMHG | BODY MASS INDEX: 34.21 KG/M2

## 2024-05-20 DIAGNOSIS — M54.12 CERVICAL RADICULITIS: Primary | ICD-10-CM

## 2024-05-20 DIAGNOSIS — F41.9 ANXIETY: ICD-10-CM

## 2024-05-20 DIAGNOSIS — M96.1 POST LAMINECTOMY SYNDROME: ICD-10-CM

## 2024-05-20 PROCEDURE — 1159F MED LIST DOCD IN RCRD: CPT | Performed by: ANESTHESIOLOGY

## 2024-05-20 PROCEDURE — 1125F AMNT PAIN NOTED PAIN PRSNT: CPT | Performed by: ANESTHESIOLOGY

## 2024-05-20 PROCEDURE — 99214 OFFICE O/P EST MOD 30 MIN: CPT | Performed by: ANESTHESIOLOGY

## 2024-05-20 PROCEDURE — 99204 OFFICE O/P NEW MOD 45 MIN: CPT | Performed by: ANESTHESIOLOGY

## 2024-05-20 PROCEDURE — 1160F RVW MEDS BY RX/DR IN RCRD: CPT | Performed by: ANESTHESIOLOGY

## 2024-05-20 PROCEDURE — 3078F DIAST BP <80 MM HG: CPT | Performed by: ANESTHESIOLOGY

## 2024-05-20 PROCEDURE — 1036F TOBACCO NON-USER: CPT | Performed by: ANESTHESIOLOGY

## 2024-05-20 PROCEDURE — 3074F SYST BP LT 130 MM HG: CPT | Performed by: ANESTHESIOLOGY

## 2024-05-20 RX ORDER — ALPRAZOLAM 0.25 MG/1
0.5 TABLET ORAL
Qty: 2 TABLET | Refills: 0 | Status: SHIPPED | OUTPATIENT
Start: 2024-05-20 | End: 2024-06-06 | Stop reason: ALTCHOICE

## 2024-05-20 ASSESSMENT — PAIN - FUNCTIONAL ASSESSMENT: PAIN_FUNCTIONAL_ASSESSMENT: 0-10

## 2024-05-20 ASSESSMENT — ENCOUNTER SYMPTOMS
PSYCHIATRIC NEGATIVE: 1
ENDOCRINE NEGATIVE: 1
RESPIRATORY NEGATIVE: 1
HEMATOLOGIC/LYMPHATIC NEGATIVE: 1
GASTROINTESTINAL NEGATIVE: 1
EYES NEGATIVE: 1
NUMBNESS: 1
BACK PAIN: 1
CONSTITUTIONAL NEGATIVE: 1
CARDIOVASCULAR NEGATIVE: 1

## 2024-05-20 ASSESSMENT — PATIENT HEALTH QUESTIONNAIRE - PHQ9
1. LITTLE INTEREST OR PLEASURE IN DOING THINGS: NOT AT ALL
SUM OF ALL RESPONSES TO PHQ9 QUESTIONS 1 AND 2: 0
2. FEELING DOWN, DEPRESSED OR HOPELESS: NOT AT ALL

## 2024-05-20 ASSESSMENT — PAIN DESCRIPTION - DESCRIPTORS: DESCRIPTORS: ACHING;NUMBNESS;SHARP;SHOOTING

## 2024-05-20 ASSESSMENT — PAIN SCALES - GENERAL
PAINLEVEL: 4
PAINLEVEL_OUTOF10: 4

## 2024-05-20 ASSESSMENT — LIFESTYLE VARIABLES: TOTAL SCORE: 0

## 2024-05-20 NOTE — PROGRESS NOTES
PAIN MANAGEMENT NEW PATIENT OFFICE NOTE    Date of Service: 5/20/2024    SUBJECTIVE    CHIEF COMPLAINT: LBP    HISTORY OF PRESENT ILLNESS    Alana Ashby is a 68 y.o. female with PMH HTN, GERD, obesity who presents as new patient referred by Eulalia Greene DO with LB pain.    Pt describes yrs of LBP without inciting trauma/incident s/p L4-S1 fusion with Dr Clements in 2022 with initial relief that pt feels has worsened since 1 y ago. Pain worse with standing/walking and better sitting down. Pain assoc with R anterolateral thigh numbness. Pain has been refractive to Tylenol, NSAIDs, >6 w PT, gabapentin. Continues to do home exercises. Saw Dr Clements on 12/8 and no additional surgery was rec'd.    Pt reports neck pain since 2016 after reported slip and fall on black ice outside of a [employer]. Pt has had burning neck pain since. Pain radiates BL to shoulders. Pain is worse with neck motion and better with rest. Occasional R hand numbness. Pt has tried heat, Tylenol, NSAIDs, >6 w home exercise program.     Pt denies new-onset weakness, bowel/bladder incontinence.  Pt denies recent infection, allergy to Latex/iodine/contrast. Patient is currently taking the following blood thinner(s): N/A    REVIEW OF SYSTEMS  Review of Systems   Constitutional: Negative.    HENT: Negative.     Eyes: Negative.    Respiratory: Negative.     Cardiovascular: Negative.    Gastrointestinal: Negative.    Endocrine: Negative.    Musculoskeletal:  Positive for back pain and gait problem.   Skin: Negative.    Neurological:  Positive for numbness.   Hematological: Negative.    Psychiatric/Behavioral: Negative.         PAST MEDICAL HISTORY  Past Medical History:   Diagnosis Date    Abdominal mass 06/30/2023    Elbow pain 06/30/2023    Encounter for screening for malignant neoplasm of colon 03/26/2014    Encounter for screening colonoscopy    Epigastric pain 06/30/2023    Knee pain 06/30/2023    Other specified diseases of gallbladder 06/26/2014     Biliary dyskinesia    Personal history of colonic polyps 04/24/2014    History of adenomatous polyp of colon    Personal history of other diseases of the digestive system     History of esophageal reflux    Personal history of other endocrine, nutritional and metabolic disease     History of hypercholesterolemia    Personal history of other specified conditions 06/04/2014    History of nausea    Right upper quadrant pain 07/10/2014    Abdominal pain, RUQ    Thumb pain 06/30/2023     Past Surgical History:   Procedure Laterality Date    CHOLECYSTECTOMY  07/10/2014    Cholecystectomy Laparoscopic    COLONOSCOPY W/ POLYPECTOMY  04/24/2014    Complete Colonoscopy For Polyp Removal    ESOPHAGOGASTRODUODENOSCOPY  06/04/2014    Diagnostic Esophagogastroduodenoscopy    HYSTERECTOMY  05/13/2013    Hysterectomy    MR HEAD ANGIO WO IV CONTRAST  4/24/2015    MR HEAD ANGIO WO IV CONTRAST 4/24/2015 GEN ANCILLARY LEGACY    MR NECK ANGIO WO IV CONTRAST  4/24/2015    MR NECK ANGIO WO IV CONTRAST 4/24/2015 GEN ANCILLARY LEGACY     No family history on file.    CURRENT MEDICATIONS  Current Outpatient Medications   Medication Sig Dispense Refill    atorvastatin (Lipitor) 20 mg tablet Take 1 tablet (20 mg) by mouth once daily. as directed 90 tablet 3    ibuprofen 200 mg tablet Take by mouth every 6 hours if needed for mild pain (1 - 3).      lisinopril 20 mg tablet Take 1 tablet (20 mg) by mouth once daily. as directed 90 tablet 3    magnesium oxide (Mag-Ox) 400 mg (241.3 mg magnesium) tablet Take 1 tablet (400 mg) by mouth once daily.      omeprazole (PriLOSEC) 40 mg DR capsule Take 1 capsule (40 mg) by mouth once daily in the morning. Take before meals. 90 capsule 3    Pain Reliever, acetaminophen, 500 mg tablet TAKE 1 TABLET BY MOUTH EVERY 4 TO 6 HOURS AS NEEDED       No current facility-administered medications for this visit.       ALLERGIES AND DRUG REACTIONS  Allergies   Allergen Reactions    Baclofen Unknown    Gabapentin  "Other     Memory loss          OBJECTIVE  Visit Vitals  /79   Pulse 74   Resp 20   Ht 1.753 m (5' 9\")   Wt 105 kg (231 lb)   BMI 34.11 kg/m²   Smoking Status Never   BSA 2.26 m²       Last Recorded Pain Score (if available):                Physical Exam  Vitals and nursing note reviewed.     General: Sitting in chair, NAD  Head: NCAT  Eyes: Sclera/conjunctiva clear, EOMI, PERRL  Nose/mouth: MMM  CV: Good distal pulses  Lungs: Good/equal chest excursion  Abdomen: Soft, ND  Ext: No cyanosis/edema  MSK: C-spine alignment: anterior tilt, BL paraspinal m TTP, c-spine ROM: extension, rotation, flexion lmtd by pain with +Spurling. Neg Lhermitte    L-spine: unremarkable alignment, BL paraspinal m TTP with L-spine TTP over surgical area, L-spine ROM: extension/flexion lmtd by pain    Neuro: AAOx3   Dermatome sensation to light touch  LEFT C5: WNL    RIGHT C5: WNL      LEFT C6: WNL       RIGHT C6: WNL      LEFT C7: WNL       RIGHT C7: WNL      LEFT C8: WNL       RIGHT C8: WNL      LEFT T1: WNL       RIGHT T1: WNL    LEFT L1 (lower pelvis/upper thigh): WNL    RIGHT L1: WNL      LEFT L2 (upper thigh): WNL       RIGHT: L2:WNL      LEFT L3 (medial knee): WNL       RIGHT L3: WNL      LEFT L4 (superior medial malleolus): WNL       RIGHT L4: WNL      LEFT L5 (dorsal foot): WNL       RIGHT L5: WNL      LEFT S1 (lateral foot): WNL     RIGHT S1: WNL      LEFT S2 (popliteal fossa): WNL    RIGHT S2: WNL        Motor strength  LEFT C5 (elbow flexion): 5/5   RIGHT C5: 5/5  LEFT C6 (wrist extension): 5/5     RIGHT C6: 5/5  LEFT C7 (elbow extension): 5/5     RIGHT C7: 5/5  LEFT C8 (finger abduction): 5/5     RIGHT C8: 5/5  LEFT T1 (hand ): 5/5     RIGHT T1: 5/5    LEFT L2 (hip flexion): 5/5   RIGHT L2: 5/5  LEFT L3 (knee extension): 5/5     RIGHT L3: 5/5  LEFT L4 (dorsiflexion): 5/5     RIGHT L4: 5/5  LEFT L5 (EHL extension): 5/5     RIGHT L5: 5/5  LEFT S1 (plantarflexion): 5/5     RIGHT S1: 5/5  LEFT S2 (knee flexion): 5/5      " RIGHT S2: 5/5    Special testing  Silva: neg BL  DTR unremarkable  Seated slump test neg BL  Clonus: neg BL  Babinski: neg BL    Psych: affect nl  Skin: no rash/lesions      REVIEW OF LABORATORY DATA  I have reviewed the following lab results:  WBC   Date Value Ref Range Status   05/26/2023 5.3 4.4 - 11.3 x10E9/L Final     RBC   Date Value Ref Range Status   05/26/2023 4.41 4.00 - 5.20 x10E12/L Final     Hemoglobin   Date Value Ref Range Status   05/26/2023 14.0 12.0 - 16.0 g/dL Final     Hematocrit   Date Value Ref Range Status   05/26/2023 41.9 36.0 - 46.0 % Final     MCV   Date Value Ref Range Status   05/26/2023 95 80 - 100 fL Final     MCHC   Date Value Ref Range Status   05/26/2023 33.4 32.0 - 36.0 g/dL Final     RDW   Date Value Ref Range Status   05/26/2023 13.0 11.5 - 14.5 % Final     Platelets   Date Value Ref Range Status   05/26/2023 191 150 - 450 x10E9/L Final     Sodium   Date Value Ref Range Status   05/26/2023 140 136 - 145 mmol/L Final     Potassium   Date Value Ref Range Status   05/26/2023 4.3 3.5 - 5.3 mmol/L Final     Bicarbonate   Date Value Ref Range Status   05/26/2023 31 21 - 32 mmol/L Final     Urea Nitrogen   Date Value Ref Range Status   05/26/2023 16 6 - 23 mg/dL Final     Calcium   Date Value Ref Range Status   05/26/2023 9.4 8.6 - 10.3 mg/dL Final     Protime   Date Value Ref Range Status   12/05/2022 11.9 9.8 - 13.4 sec Final     INR   Date Value Ref Range Status   12/05/2022 1.0 0.9 - 1.1 Final         REVIEW OF RADIOLOGY   I have reviewed the following:  Radiology Studies           XR L-spine 12/78/23:  Laminectomy posterior fusion L4 through S1. Kyphoplasty changes at  the L4 and S1 level. There is mild anterolisthesis L4-L5 and L5 on  S1, similar to the prior. The remainder of the lumbar spine is  unremarkable      IMPRESSION:  Postsurgical changes L4-S1 without evidence of hardware failure or  malalignment.       ASSESSMENT & PLAN  Alana Ashby is a 68 y.o. old female with PMH  HTN, GERD, obesity who presents as new patient referred by Eulalia Greene DO with LB pain     1) LBP  -s/p L4-S1 fusion 2022 worsening since 2023 with subj RLE numbness  -Refractive to  >1 y conservative tx including Tylenol, NSAIDs, >6 w PT, gabapentin  -Saw Dr Clements 12/8 where no additional surgery was rec'd  -MRI L-spine to eval neuraxial pathology. Will order open with Xanax 2/2 anxiety  -F/U 2 w    2) Neck pain  -Since slip and fall 2016 radiating to BL shoulders with +Spurling, occasional R hand numbness  -Refractive to yrs of conservative tx including heat, Tylenol, NSAIDs, >6 w home exercise program  -CT C-spine to eval neuraxial dz                 Lj Delcid MD  Anesthesiologist & Interventional Pain Physician   [facility]  O: [phone]  F: [phone]  9:43 AM  05/20/24

## 2024-05-20 NOTE — LETTER
May 20, 2024     Eulalia Greene DO  [address]  [place] [address]    Patient: Alana Ashby   YOB: 1955   Date of Visit: 5/20/2024       Dear Dr. Eulalia Greene DO:    Thank you for referring Alana Ashby to me for evaluation. Below are my notes for this consultation.  If you have questions, please do not hesitate to call me. I look forward to following your patient along with you.       Sincerely,     Lj Delcid MD      CC: No Recipients  ______________________________________________________________________________________    PAIN MANAGEMENT NEW PATIENT OFFICE NOTE    Date of Service: 5/20/2024    SUBJECTIVE    CHIEF COMPLAINT: LBP    HISTORY OF PRESENT ILLNESS    Alana Ashby is a 68 y.o. female with PMH HTN, GERD, obesity who presents as new patient referred by Eulalia Greene DO with LB pain.    Pt describes yrs of LBP without inciting trauma/incident s/p L4-S1 fusion with Dr Clements in 2022 with initial relief that pt feels has worsened since 1 y ago. Pain worse with standing/walking and better sitting down. Pain assoc with R anterolateral thigh numbness. Pain has been refractive to Tylenol, NSAIDs, >6 w PT, gabapentin. Continues to do home exercises. Saw Dr Clements on 12/8 and no additional surgery was rec'd.    Pt reports neck pain since 2016 after reported slip and fall on black ice outside of a [email or web address]. Pt has had burning neck pain since. Pain radiates BL to shoulders. Pain is worse with neck motion and better with rest. Occasional R hand numbness. Pt has tried heat, Tylenol, NSAIDs, >6 w home exercise program.     Pt denies new-onset weakness, bowel/bladder incontinence.  Pt denies recent infection, allergy to Latex/iodine/contrast. Patient is currently taking the following blood thinner(s): N/A    REVIEW OF SYSTEMS  Review of Systems   Constitutional: Negative.    HENT: Negative.     Eyes: Negative.    Respiratory: Negative.     Cardiovascular: Negative.     Gastrointestinal: Negative.    Endocrine: Negative.    Musculoskeletal:  Positive for back pain and gait problem.   Skin: Negative.    Neurological:  Positive for numbness.   Hematological: Negative.    Psychiatric/Behavioral: Negative.         PAST MEDICAL HISTORY  Past Medical History:   Diagnosis Date   • Abdominal mass 06/30/2023   • Elbow pain 06/30/2023   • Encounter for screening for malignant neoplasm of colon 03/26/2014    Encounter for screening colonoscopy   • Epigastric pain 06/30/2023   • Knee pain 06/30/2023   • Other specified diseases of gallbladder 06/26/2014    Biliary dyskinesia   • Personal history of colonic polyps 04/24/2014    History of adenomatous polyp of colon   • Personal history of other diseases of the digestive system     History of esophageal reflux   • Personal history of other endocrine, nutritional and metabolic disease     History of hypercholesterolemia   • Personal history of other specified conditions 06/04/2014    History of nausea   • Right upper quadrant pain 07/10/2014    Abdominal pain, RUQ   • Thumb pain 06/30/2023     Past Surgical History:   Procedure Laterality Date   • CHOLECYSTECTOMY  07/10/2014    Cholecystectomy Laparoscopic   • COLONOSCOPY W/ POLYPECTOMY  04/24/2014    Complete Colonoscopy For Polyp Removal   • ESOPHAGOGASTRODUODENOSCOPY  06/04/2014    Diagnostic Esophagogastroduodenoscopy   • HYSTERECTOMY  05/13/2013    Hysterectomy   • MR HEAD ANGIO WO IV CONTRAST  4/24/2015    MR HEAD ANGIO WO IV CONTRAST 4/24/2015 [facility]GAC[facility]   • MR NECK ANGIO WO IV CONTRAST  4/24/2015    MR NECK ANGIO WO IV CONTRAST 4/24/2015 [facility]     No family history on file.    CURRENT MEDICATIONS  Current Outpatient Medications   Medication Sig Dispense Refill   • atorvastatin (Lipitor) 20 mg tablet Take 1 tablet (20 mg) by mouth once daily. as directed 90 tablet 3   • ibuprofen 200 mg tablet Take by mouth every 6 hours if needed for mild pain (1 - 3).     •  "lisinopril 20 mg tablet Take 1 tablet (20 mg) by mouth once daily. as directed 90 tablet 3   • magnesium oxide (Mag-Ox) 400 mg (241.3 mg magnesium) tablet Take 1 tablet (400 mg) by mouth once daily.     • omeprazole (PriLOSEC) 40 mg DR capsule Take 1 capsule (40 mg) by mouth once daily in the morning. Take before meals. 90 capsule 3   • Pain Reliever, acetaminophen, 500 mg tablet TAKE 1 TABLET BY MOUTH EVERY 4 TO 6 HOURS AS NEEDED       No current facility-administered medications for this visit.       ALLERGIES AND DRUG REACTIONS  Allergies   Allergen Reactions   • Baclofen Unknown   • Gabapentin Other     Memory loss          OBJECTIVE  Visit Vitals  /79   Pulse 74   Resp 20   Ht 1.753 m (5' 9\")   Wt 105 kg (231 lb)   BMI 34.11 kg/m²   Smoking Status Never   BSA 2.26 m²       Last Recorded Pain Score (if available):                Physical Exam  Vitals and nursing note reviewed.     General: Sitting in chair, NAD  Head: NCAT  Eyes: Sclera/conjunctiva clear, EOMI, PERRL  Nose/mouth: MMM  CV: Good distal pulses  Lungs: Good/equal chest excursion  Abdomen: Soft, ND  Ext: No cyanosis/edema  MSK: C-spine alignment: anterior tilt, BL paraspinal m TTP, c-spine ROM: extension, rotation, flexion lmtd by pain with +Spurling. Neg Lhermitte    L-spine: unremarkable alignment, BL paraspinal m TTP with L-spine TTP over surgical area, L-spine ROM: extension/flexion lmtd by pain    Neuro: AAOx3   Dermatome sensation to light touch  LEFT C5: WNL    RIGHT C5: WNL      LEFT C6: WNL       RIGHT C6: WNL      LEFT C7: WNL       RIGHT C7: WNL      LEFT C8: WNL       RIGHT C8: WNL      LEFT T1: WNL       RIGHT T1: WNL    LEFT L1 (lower pelvis/upper thigh): WNL    RIGHT L1: WNL      LEFT L2 (upper thigh): WNL       RIGHT: L2:WNL      LEFT L3 (medial knee): WNL       RIGHT L3: WNL      LEFT L4 (superior medial malleolus): WNL       RIGHT L4: WNL      LEFT L5 (dorsal foot): WNL       RIGHT L5: WNL      LEFT S1 (lateral foot): WNL     " RIGHT S1: WNL      LEFT S2 (popliteal fossa): WNL    RIGHT S2: WNL        Motor strength  LEFT C5 (elbow flexion): 5/5   RIGHT C5: 5/5  LEFT C6 (wrist extension): 5/5     RIGHT C6: 5/5  LEFT C7 (elbow extension): 5/5     RIGHT C7: 5/5  LEFT C8 (finger abduction): 5/5     RIGHT C8: 5/5  LEFT T1 (hand ): 5/5     RIGHT T1: 5/5    LEFT L2 (hip flexion): 5/5   RIGHT L2: 5/5  LEFT L3 (knee extension): 5/5     RIGHT L3: 5/5  LEFT L4 (dorsiflexion): 5/5     RIGHT L4: 5/5  LEFT L5 (EHL extension): 5/5     RIGHT L5: 5/5  LEFT S1 (plantarflexion): 5/5     RIGHT S1: 5/5  LEFT S2 (knee flexion): 5/5      RIGHT S2: 5/5    Special testing  Silva: neg BL  DTR unremarkable  Seated slump test neg BL  Clonus: neg BL  Babinski: neg BL    Psych: affect nl  Skin: no rash/lesions      REVIEW OF LABORATORY DATA  I have reviewed the following lab results:  WBC   Date Value Ref Range Status   05/26/2023 5.3 4.4 - 11.3 x10E9/L Final     RBC   Date Value Ref Range Status   05/26/2023 4.41 4.00 - 5.20 x10E12/L Final     Hemoglobin   Date Value Ref Range Status   05/26/2023 14.0 12.0 - 16.0 g/dL Final     Hematocrit   Date Value Ref Range Status   05/26/2023 41.9 36.0 - 46.0 % Final     MCV   Date Value Ref Range Status   05/26/2023 95 80 - 100 fL Final     MCHC   Date Value Ref Range Status   05/26/2023 33.4 32.0 - 36.0 g/dL Final     RDW   Date Value Ref Range Status   05/26/2023 13.0 11.5 - 14.5 % Final     Platelets   Date Value Ref Range Status   05/26/2023 191 150 - 450 x10E9/L Final     Sodium   Date Value Ref Range Status   05/26/2023 140 136 - 145 mmol/L Final     Potassium   Date Value Ref Range Status   05/26/2023 4.3 3.5 - 5.3 mmol/L Final     Bicarbonate   Date Value Ref Range Status   05/26/2023 31 21 - 32 mmol/L Final     Urea Nitrogen   Date Value Ref Range Status   05/26/2023 16 6 - 23 mg/dL Final     Calcium   Date Value Ref Range Status   05/26/2023 9.4 8.6 - 10.3 mg/dL Final     Protime   Date Value Ref Range Status    12/05/2022 11.9 9.8 - 13.4 sec Final     INR   Date Value Ref Range Status   12/05/2022 1.0 0.9 - 1.1 Final         REVIEW OF RADIOLOGY   I have reviewed the following:  Radiology Studies           XR L-spine 12/78/23:  Laminectomy posterior fusion L4 through S1. Kyphoplasty changes at  the L4 and S1 level. There is mild anterolisthesis L4-L5 and L5 on  S1, similar to the prior. The remainder of the lumbar spine is  unremarkable      IMPRESSION:  Postsurgical changes L4-S1 without evidence of hardware failure or  malalignment.       ASSESSMENT & PLAN  Alana Ashby is a 68 y.o. old female with PMH HTN, GERD, obesity who presents as new patient referred by Eulalia Greene DO with LB pain     1) LBP  -s/p L4-S1 fusion 2022 worsening since 2023 with subj RLE numbness  -Refractive to  >1 y conservative tx including Tylenol, NSAIDs, >6 w PT, gabapentin  -Saw Dr Clements 12/8 where no additional surgery was rec'd  -MRI L-spine to eval neuraxial pathology. Will order open with Xanax 2/2 anxiety  -F/U 2 w    2) Neck pain  -Since slip and fall 2016 radiating to BL shoulders with +Spurling, occasional R hand numbness  -Refractive to yrs of conservative tx including heat, Tylenol, NSAIDs, >6 w home exercise program  -CT C-spine to eval neuraxial dz                 Lj Delcid MD  Anesthesiologist & Interventional Pain Physician   [facility]  O: [phone]  F: [phone]  9:43 AM  05/20/24

## 2024-05-23 ENCOUNTER — HOSPITAL ENCOUNTER (OUTPATIENT)
Dept: RADIOLOGY | Facility: CLINIC | Age: 69
Discharge: HOME | End: 2024-05-23
Payer: MEDICARE

## 2024-05-23 DIAGNOSIS — M96.1 POST LAMINECTOMY SYNDROME: ICD-10-CM

## 2024-05-23 PROCEDURE — 72148 MRI LUMBAR SPINE W/O DYE: CPT

## 2024-05-23 PROCEDURE — 72148 MRI LUMBAR SPINE W/O DYE: CPT | Performed by: RADIOLOGY

## 2024-05-24 ENCOUNTER — HOSPITAL ENCOUNTER (OUTPATIENT)
Dept: RADIOLOGY | Facility: HOSPITAL | Age: 69
Discharge: HOME | End: 2024-05-24
Payer: MEDICARE

## 2024-05-24 DIAGNOSIS — M54.12 CERVICAL RADICULITIS: ICD-10-CM

## 2024-05-24 PROCEDURE — 72125 CT NECK SPINE W/O DYE: CPT

## 2024-05-24 PROCEDURE — 72125 CT NECK SPINE W/O DYE: CPT | Performed by: RADIOLOGY

## 2024-05-28 ENCOUNTER — TELEPHONE (OUTPATIENT)
Dept: PRIMARY CARE | Facility: CLINIC | Age: 69
End: 2024-05-28
Payer: MEDICARE

## 2024-05-28 DIAGNOSIS — R91.1 LUNG NODULE: ICD-10-CM

## 2024-05-28 DIAGNOSIS — E04.1 THYROID NODULE: Primary | ICD-10-CM

## 2024-05-28 NOTE — TELEPHONE ENCOUNTER
One lung nodule, not particularly concerning but I did order ct chest to followup. Definitely need to follow up on thyroid nodules, I ordered ultrasound for this. Can you let her know?

## 2024-05-30 ENCOUNTER — TELEPHONE (OUTPATIENT)
Dept: PAIN MEDICINE | Facility: CLINIC | Age: 69
End: 2024-05-30
Payer: MEDICARE

## 2024-05-30 ENCOUNTER — HOSPITAL ENCOUNTER (OUTPATIENT)
Dept: RADIOLOGY | Facility: HOSPITAL | Age: 69
Discharge: HOME | End: 2024-05-30
Payer: MEDICARE

## 2024-05-30 DIAGNOSIS — E04.1 THYROID NODULE: ICD-10-CM

## 2024-05-30 PROCEDURE — 76536 US EXAM OF HEAD AND NECK: CPT | Performed by: RADIOLOGY

## 2024-05-30 PROCEDURE — 76536 US EXAM OF HEAD AND NECK: CPT

## 2024-06-05 ENCOUNTER — OFFICE VISIT (OUTPATIENT)
Dept: PAIN MEDICINE | Facility: CLINIC | Age: 69
End: 2024-06-05
Payer: MEDICARE

## 2024-06-05 VITALS
HEART RATE: 81 BPM | SYSTOLIC BLOOD PRESSURE: 116 MMHG | DIASTOLIC BLOOD PRESSURE: 79 MMHG | HEIGHT: 69 IN | WEIGHT: 230 LBS | BODY MASS INDEX: 34.07 KG/M2 | OXYGEN SATURATION: 99 %

## 2024-06-05 DIAGNOSIS — G89.29 CHRONIC LOW BACK PAIN, UNSPECIFIED BACK PAIN LATERALITY, UNSPECIFIED WHETHER SCIATICA PRESENT: Primary | ICD-10-CM

## 2024-06-05 DIAGNOSIS — M54.50 CHRONIC LOW BACK PAIN, UNSPECIFIED BACK PAIN LATERALITY, UNSPECIFIED WHETHER SCIATICA PRESENT: Primary | ICD-10-CM

## 2024-06-05 DIAGNOSIS — M54.12 CERVICAL RADICULITIS: ICD-10-CM

## 2024-06-05 DIAGNOSIS — M54.2 NECK PAIN: ICD-10-CM

## 2024-06-05 DIAGNOSIS — M96.1 POSTLAMINECTOMY SYNDROME OF LUMBAR REGION: ICD-10-CM

## 2024-06-05 PROCEDURE — 3078F DIAST BP <80 MM HG: CPT | Performed by: ANESTHESIOLOGY

## 2024-06-05 PROCEDURE — 99214 OFFICE O/P EST MOD 30 MIN: CPT | Performed by: ANESTHESIOLOGY

## 2024-06-05 PROCEDURE — 1160F RVW MEDS BY RX/DR IN RCRD: CPT | Performed by: ANESTHESIOLOGY

## 2024-06-05 PROCEDURE — 3074F SYST BP LT 130 MM HG: CPT | Performed by: ANESTHESIOLOGY

## 2024-06-05 PROCEDURE — 1159F MED LIST DOCD IN RCRD: CPT | Performed by: ANESTHESIOLOGY

## 2024-06-05 PROCEDURE — 1125F AMNT PAIN NOTED PAIN PRSNT: CPT | Performed by: ANESTHESIOLOGY

## 2024-06-05 ASSESSMENT — ENCOUNTER SYMPTOMS
CARDIOVASCULAR NEGATIVE: 1
PSYCHIATRIC NEGATIVE: 1
ENDOCRINE NEGATIVE: 1
HEMATOLOGIC/LYMPHATIC NEGATIVE: 1
EYES NEGATIVE: 1
BACK PAIN: 1
RESPIRATORY NEGATIVE: 1
CONSTITUTIONAL NEGATIVE: 1
NUMBNESS: 1
GASTROINTESTINAL NEGATIVE: 1

## 2024-06-05 ASSESSMENT — PAIN DESCRIPTION - DESCRIPTORS: DESCRIPTORS: ACHING;SHARP;STABBING

## 2024-06-05 ASSESSMENT — PAIN SCALES - GENERAL
PAINLEVEL: 6
PAINLEVEL_OUTOF10: 6

## 2024-06-05 ASSESSMENT — PATIENT HEALTH QUESTIONNAIRE - PHQ9
2. FEELING DOWN, DEPRESSED OR HOPELESS: NOT AT ALL
SUM OF ALL RESPONSES TO PHQ9 QUESTIONS 1 AND 2: 0
1. LITTLE INTEREST OR PLEASURE IN DOING THINGS: NOT AT ALL

## 2024-06-05 ASSESSMENT — PAIN - FUNCTIONAL ASSESSMENT: PAIN_FUNCTIONAL_ASSESSMENT: 0-10

## 2024-06-05 NOTE — PROGRESS NOTES
PAIN MANAGEMENT FOLLOW-UP OFFICE NOTE    Date of Service: 6/5/2024    SUBJECTIVE    CHIEF COMPLAINT: LBP    HISTORY OF PRESENT ILLNESS    Alana Ashby is a 68 y.o. female with PMH HTN, GERD, obesity who presents for F/U LB pain.    Since her last visit, pt endorses ongoing LBP assoc with RLE numbness. She also claims continued neck pain with R hand numbness. Would like to review MRI results and next steps    Pt denies new-onset weakness, bowel/bladder incontinence.  Pt denies recent infection, allergy to Latex/iodine/contrast. Patient is currently taking the following blood thinner(s): N/A    REVIEW OF SYSTEMS  Review of Systems   Constitutional: Negative.    HENT: Negative.     Eyes: Negative.    Respiratory: Negative.     Cardiovascular: Negative.    Gastrointestinal: Negative.    Endocrine: Negative.    Musculoskeletal:  Positive for back pain and gait problem.   Skin: Negative.    Neurological:  Positive for numbness.   Hematological: Negative.    Psychiatric/Behavioral: Negative.         PAST MEDICAL HISTORY  Past Medical History:   Diagnosis Date    Abdominal mass 06/30/2023    Chronic pain disorder 1970?    Elbow pain 06/30/2023    Encounter for screening for malignant neoplasm of colon 03/26/2014    Encounter for screening colonoscopy    Endometriosis 1991    Epigastric pain 06/30/2023    Knee pain 06/30/2023    Low back pain 1970 ?    Neck pain 2019 ?    Other specified diseases of gallbladder 06/26/2014    Biliary dyskinesia    Personal history of colonic polyps 04/24/2014    History of adenomatous polyp of colon    Personal history of other diseases of the digestive system     History of esophageal reflux    Personal history of other endocrine, nutritional and metabolic disease     History of hypercholesterolemia    Personal history of other specified conditions 06/04/2014    History of nausea    Right upper quadrant pain 07/10/2014    Abdominal pain, RUQ    Thumb pain 06/30/2023     Past Surgical  "History:   Procedure Laterality Date    BACK SURGERY  12/21/2022    CHOLECYSTECTOMY  07/10/2014    Cholecystectomy Laparoscopic    COLONOSCOPY W/ POLYPECTOMY  04/24/2014    Complete Colonoscopy For Polyp Removal    ESOPHAGOGASTRODUODENOSCOPY  06/04/2014    Diagnostic Esophagogastroduodenoscopy    HYSTERECTOMY  05/13/2013    Hysterectomy    MR HEAD ANGIO WO IV CONTRAST  04/24/2015    MR HEAD ANGIO WO IV CONTRAST 4/24/2015 [facility]    MR NECK ANGIO WO IV CONTRAST  04/24/2015    MR NECK ANGIO WO IV CONTRAST 4/24/2015 [facility]    SPINAL FUSION  12/21/2022     Family History   Problem Relation Name Age of Onset    Cancer Mother Janet Young     Hypertension Mother Janet Young     Diabetes Father Jim Young     Stroke Father Jim Young        CURRENT MEDICATIONS  Current Outpatient Medications   Medication Sig Dispense Refill    atorvastatin (Lipitor) 20 mg tablet Take 1 tablet (20 mg) by mouth once daily. as directed 90 tablet 3    ibuprofen 200 mg tablet Take by mouth every 6 hours if needed for mild pain (1 - 3).      lisinopril 20 mg tablet Take 1 tablet (20 mg) by mouth once daily. as directed 90 tablet 3    magnesium oxide (Mag-Ox) 400 mg (241.3 mg magnesium) tablet Take 1 tablet (400 mg) by mouth once daily.      omeprazole (PriLOSEC) 40 mg DR capsule Take 1 capsule (40 mg) by mouth once daily in the morning. Take before meals. 90 capsule 3    Pain Reliever, acetaminophen, 500 mg tablet TAKE 1 TABLET BY MOUTH EVERY 4 TO 6 HOURS AS NEEDED      ALPRAZolam (Xanax) 0.25 mg tablet Take 2 tablets (0.5 mg) by mouth 1 time if needed for anxiety (30 min before MRI) for up to 1 dose. 2 tablet 0     No current facility-administered medications for this visit.       ALLERGIES AND DRUG REACTIONS  Allergies   Allergen Reactions    Baclofen Unknown    Gabapentin Other     Memory loss          OBJECTIVE  Visit Vitals  /79   Pulse 81   Ht 1.753 m (5' 9\")   Wt 104 kg (230 lb)   SpO2 99%   BMI " 33.97 kg/m²   Smoking Status Never   BSA 2.25 m²       Last Recorded Pain Score (if available):                Physical Exam  Vitals and nursing note reviewed.       General: Sitting in chair, NAD  Head: NCAT  Eyes: Sclera/conjunctiva clear, EOMI, PERRL  Nose/mouth: MMM  CV: Good distal pulses  Lungs: Good/equal chest excursion  Abdomen: Soft, ND  Ext: No cyanosis/edema  MSK: C-spine alignment: anterior tilt, BL paraspinal m TTP, c-spine ROM: extension, rotation, flexion lmtd by pain with +Spurling. Neg Lhermitte    L-spine: unremarkable alignment, BL paraspinal m TTP with L-spine TTP over surgical area, L-spine ROM: extension/flexion lmtd by pain    Neuro: AAOx3   Dermatome sensation to light touch  LEFT C5: WNL    RIGHT C5: WNL      LEFT C6: WNL       RIGHT C6: WNL      LEFT C7: WNL       RIGHT C7: WNL      LEFT C8: WNL       RIGHT C8: WNL      LEFT T1: WNL       RIGHT T1: WNL    LEFT L1 (lower pelvis/upper thigh): WNL    RIGHT L1: WNL      LEFT L2 (upper thigh): WNL       RIGHT: L2:WNL      LEFT L3 (medial knee): WNL       RIGHT L3: WNL      LEFT L4 (superior medial malleolus): WNL       RIGHT L4: WNL      LEFT L5 (dorsal foot): WNL       RIGHT L5: WNL      LEFT S1 (lateral foot): WNL     RIGHT S1: WNL      LEFT S2 (popliteal fossa): WNL    RIGHT S2: WNL        Motor strength  LEFT C5 (elbow flexion): 5/5   RIGHT C5: 5/5  LEFT C6 (wrist extension): 5/5     RIGHT C6: 5/5  LEFT C7 (elbow extension): 5/5     RIGHT C7: 5/5  LEFT C8 (finger abduction): 5/5     RIGHT C8: 5/5  LEFT T1 (hand ): 5/5     RIGHT T1: 5/5    LEFT L2 (hip flexion): 5/5   RIGHT L2: 5/5  LEFT L3 (knee extension): 5/5     RIGHT L3: 5/5  LEFT L4 (dorsiflexion): 5/5     RIGHT L4: 5/5  LEFT L5 (EHL extension): 5/5     RIGHT L5: 5/5  LEFT S1 (plantarflexion): 5/5     RIGHT S1: 5/5  LEFT S2 (knee flexion): 5/5      RIGHT S2: 5/5    Special testing  Silva: neg BL  DTR unremarkable      Psych: affect nl  Skin: no rash/lesions      REVIEW OF  LABORATORY DATA  I have reviewed the following lab results:  WBC   Date Value Ref Range Status   05/26/2023 5.3 4.4 - 11.3 x10E9/L Final     RBC   Date Value Ref Range Status   05/26/2023 4.41 4.00 - 5.20 x10E12/L Final     Hemoglobin   Date Value Ref Range Status   05/26/2023 14.0 12.0 - 16.0 g/dL Final     Hematocrit   Date Value Ref Range Status   05/26/2023 41.9 36.0 - 46.0 % Final     MCV   Date Value Ref Range Status   05/26/2023 95 80 - 100 fL Final     MCHC   Date Value Ref Range Status   05/26/2023 33.4 32.0 - 36.0 g/dL Final     RDW   Date Value Ref Range Status   05/26/2023 13.0 11.5 - 14.5 % Final     Platelets   Date Value Ref Range Status   05/26/2023 191 150 - 450 x10E9/L Final     Sodium   Date Value Ref Range Status   05/26/2023 140 136 - 145 mmol/L Final     Potassium   Date Value Ref Range Status   05/26/2023 4.3 3.5 - 5.3 mmol/L Final     Bicarbonate   Date Value Ref Range Status   05/26/2023 31 21 - 32 mmol/L Final     Urea Nitrogen   Date Value Ref Range Status   05/26/2023 16 6 - 23 mg/dL Final     Calcium   Date Value Ref Range Status   05/26/2023 9.4 8.6 - 10.3 mg/dL Final     Protime   Date Value Ref Range Status   12/05/2022 11.9 9.8 - 13.4 sec Final     INR   Date Value Ref Range Status   12/05/2022 1.0 0.9 - 1.1 Final         REVIEW OF RADIOLOGY   I have reviewed the following:  Radiology Studies      CT c-spine 5/24/24:  No acute fracture or posttraumatic subluxation.      C2-3: No significant central canal or foraminal stenosis.  C3-4: No significant central canal or foraminal stenosis.  C4-5: Uncovertebral osteophytes are noted more on the right side  minimally narrowing the right neuroforamen C5-6: Disc osteophyte  complex indents the ventral thecal sac. Uncovertebral osteophytes  moderately narrow the right and minimally narrow the left  neuroforamen. C6-7: No significant central canal or foraminal  stenosis. C7-T1: Hypertrophic facet changes are noted minimally  narrowing the  neuroforamina.      4 mL indistinct nodule right upper lung image 417 series 201 for  instance incompletely evaluated on the current exam and nonspecific.  1.6 cm hypodense nodule left thyroid lobe with chunky calcification  incidentally noted. The thyroid gland is otherwise heterogeneous in  appearance.          IMPRESSION:  Degenerative changes of the cervical spine as above described worst  at C5-6. If further evaluation of the central canal and neuroforamina  were clinically necessary MRI could be considered.          MRI L-spine 5/23/24:  This report assumes 5 non-rib bearing lumbar vertebral bodies. The  lowest intervertebral disc will be labeled L5-S1.      There are new postoperative changes from right laminectomy at L4-L5  with posterior spinal fusion including placement of bilateral  pedicular screws which terminate within the L4 and L5 vertebral  bodies and are interconnected by parallel rods. There are also  intervertebral disc spacers at L4-L5 and L5-S1. Per clinical note,  patient underwent left L5-S1 laminectomy which is partially  visualized. Current study is not tailored to assess the surgical  hardware.      Grade 1 anterolisthesis at L4-L5 and L5-S1 seen on the prior MRI has  decreased. Otherwise, there is no striking spondylolisthesis.  Vertebral body and remaining intervertebral disc heights are  maintained. There are chronic degenerative endplate changes at few  levels including mild endplate scalloping. Marrow signal is overall  within normal limits. There is a hemangioma within the L1 vertebral  body.      The conus medullaris terminates at the level of L1-L2 and is  unremarkable in appearance.      At T12-L1 and L1-L2, there is no posterior disc contour abnormality.  There is no spinal canal stenosis or neural foraminal narrowing.  There is no facet osteoarthropathy.      At L2-L3, there is a stable small diffuse disc bulge. There is no  spinal canal stenosis or neural foraminal narrowing.  There is no  striking facet osteoarthropathy.      At L3-L4, there is a small diffuse disc bulge with a central annular  fissure which causes mild spinal canal stenosis. There is no neural  foraminal narrowing. There is no facet osteoarthropathy.      At L4-L5, there is no posterior disc contour abnormality. There is no  spinal canal stenosis or neural foraminal narrowing. It is somewhat  difficult to evaluate the facet joints due to susceptibility  artifact, noting this, bilateral facet osteoarthropathy has  decreased, likely sequela of prior facetectomy.      At L5-S1, there is no posterior disc contour abnormality. There is no  spinal canal stenosis or striking neural foraminal narrowing. Partial  visualization of left laminectomy. Bilateral facet osteoarthropathy  has decreased, possibly sequela of recent surgery.      IMPRESSION:  1. Postoperative changes from laminectomies and spinal fusion at  L4-L5 and L5-S1 with resolution of spinal canal stenosis L4-L5.      2. Otherwise, essentially stable multilevel degenerative changes as  detailed above.             XR L-spine 12/78/23:  Laminectomy posterior fusion L4 through S1. Kyphoplasty changes at  the L4 and S1 level. There is mild anterolisthesis L4-L5 and L5 on  S1, similar to the prior. The remainder of the lumbar spine is  unremarkable      IMPRESSION:  Postsurgical changes L4-S1 without evidence of hardware failure or  malalignment.       ASSESSMENT & PLAN  Alana Ashby is a 68 y.o. old female with PMH HTN, GERD, obesity who presents for F/U LB pain     1) LBP  -s/p L4-S1 fusion 2022 worsening since 2023 with subj RLE numbness  -Refractive to  >1 y conservative tx including Tylenol, NSAIDs, >6 w PT, gabapentin  -Saw Dr Clements 12/8 where no additional surgery was rec'd  -Reviewed/discussed MRI L-spine 5/23/24: L4-S1 lami/fusion stable, multilevel spondylosis featuring L3-4 disc bulge/fissure contributing to mild stenosis  -Schedule caudal CHRISTIANA w/ IV sed to  target pain generator as seen on imaging and minimize risk/likelihood of chronic opioid use and/or surgery    2) Neck pain  -Since slip and fall 2016 radiating to BL shoulders with +Spurling, occasional R hand numbness  -Refractive to yrs of conservative tx including heat, Tylenol, NSAIDs, >6 w home exercise program  -Reviewed/discussed CT C-spine 5/24/24: multilevel spondylosis featuring C5-6 disc complex indenting dura with moderate R>L NF stenosis  -Schedule C7-T1 CHRISTIANA w/ IV sed to target pain generator as seen on imaging and minimize risk/likelihood of chronic opioid use and/or surgery        Discussed procedure risks/benefits in detail with patient. Pt meets medical necessity for procedure due to failure of conservative measures. Reviewed procedural risks including bleeding, infection, nerve damage, paralysis. Also reviewed mitigating factors such as screening for infection/blood thinner use, sterile precautions, and image-guidance when applicable. All questions answered. Pt/guardian expressed understanding and choose to proceed               Lj Delcid MD  Anesthesiologist & Interventional Pain Physician   [facility]  O: [phone]  F: [phone]  10:30 AM  06/05/24

## 2024-06-06 PROBLEM — M54.2 NECK PAIN: Status: ACTIVE | Noted: 2024-06-06

## 2024-06-07 DIAGNOSIS — E04.2 MULTIPLE THYROID NODULES: Primary | ICD-10-CM

## 2024-06-11 ENCOUNTER — ANCILLARY PROCEDURE (OUTPATIENT)
Dept: RADIOLOGY | Facility: EXTERNAL LOCATION | Age: 69
End: 2024-06-11
Payer: MEDICARE

## 2024-06-11 DIAGNOSIS — M96.1 POSTLAMINECTOMY SYNDROME, NOT ELSEWHERE CLASSIFIED: ICD-10-CM

## 2024-06-11 PROCEDURE — 62323 NJX INTERLAMINAR LMBR/SAC: CPT | Performed by: ANESTHESIOLOGY

## 2024-06-11 PROCEDURE — 99152 MOD SED SAME PHYS/QHP 5/>YRS: CPT | Performed by: ANESTHESIOLOGY

## 2024-06-11 NOTE — PROGRESS NOTES
OPERATIVE NOTE  Preprocedure diagnosis: Lumbar PLS  Postprocedure diagnosis Lumbar PLS    Procedure performed: Caudal CHRISTIANA  Physician: Lj Delcid MD  Anesthesia: Minimal-Moderate IV conscious sedation  Complications: none  Blood loss:  Nil    Clinical note: This is a very pleasant 68 y.o. old female with PMH HTN, GERD, obesity who suffers with LBP here meeting all medical criteria for above-mentioned procedure. Patient's pain stable and persistent from last visit.  Appropriately NPO.  No personal/family hx issues with anesthesia. Denies allergies to Latex, steroids, local anesthetics, or iodine/contrast. Denies being on blood thinners. Not diabetic.  Denies fever, chills, NS, CP, SOB, cough, N/V.    Pt's buttock erythematous. She reports she has been using heating pad copiously. Advised not to using heating pad more than 20 min at a time as not to damage skin. Otherwise, no sign/sx of infxn. Will take care to enter outside of inflamed area as a precaution.    Discussed procedure risks/benefits in detail with patient. Pt meets medical necessity for procedure due to failure of conservative measures. Reviewed procedural risks including bleeding, infection, nerve damage, paralysis. Also reviewed mitigating factors such as screening for infection/blood thinner use, sterile precautions, and image-guidance when applicable. All questions answered. Pt/guardian expressed understanding and choose to proceed      Procedure:  Procedure  Caudal epidural steroid injection    Location   [facility]    Indications  This patient is here today to receive a caudal epidural steroid injection to assist with pain    Procedure Details  The patient was taken to the procedure room and was placed in prone positioning. The sacral area was prepped and draped sterilely in the normal fashion. The skin and subcutaneous tissue was anesthetized with 1% lidocaine. Then, an 18-G Tuohy needle introduced through the sacral hiatus in a lateral  fluoroscopic guidance view and advanced into the AP view. After negative aspiration, a soft-tip epidural catheter was threaded through the Tuohy. After negative aspiration, 3 ml Omnipaque contrast was injected revealing adequate epidural spread without vascular uptake. After negative aspiration, 80 mg triamcinolone + 3 ml preservative free normal saline was easily injected for a total volume of 5 ml.     The patient was, throughout the procedure, monitored by the nursing staff. See nursing record/flowsheets for detailed VS. The patient tolerated the procedure well, was taken to the recovery room, allowed to recover for a sufficient amount of time and then was discharged home in a stable condition. The patient was advised to follow-up in 2 weeks.       Lj Delcid MD  Anesthesiologist & Interventional Pain Physician   [facility] [place]  O: [phone]  F: [phone]  9:01 AM  06/11/24

## 2024-06-12 ENCOUNTER — HOSPITAL ENCOUNTER (OUTPATIENT)
Dept: RADIOLOGY | Facility: HOSPITAL | Age: 69
Discharge: HOME | End: 2024-06-12
Payer: MEDICARE

## 2024-06-12 DIAGNOSIS — R91.1 LUNG NODULE: ICD-10-CM

## 2024-06-12 PROCEDURE — 71250 CT THORAX DX C-: CPT | Performed by: RADIOLOGY

## 2024-06-12 PROCEDURE — 71250 CT THORAX DX C-: CPT

## 2024-06-21 DIAGNOSIS — I15.9 SECONDARY HYPERTENSION: ICD-10-CM

## 2024-06-21 DIAGNOSIS — E04.1 THYROID NODULE: ICD-10-CM

## 2024-06-21 DIAGNOSIS — E78.5 HYPERLIPIDEMIA, UNSPECIFIED HYPERLIPIDEMIA TYPE: ICD-10-CM

## 2024-06-21 DIAGNOSIS — R59.1 LYMPHADENOPATHY: ICD-10-CM

## 2024-06-24 ENCOUNTER — LAB (OUTPATIENT)
Dept: LAB | Facility: LAB | Age: 69
End: 2024-06-24
Payer: MEDICARE

## 2024-06-24 DIAGNOSIS — E04.1 THYROID NODULE: ICD-10-CM

## 2024-06-24 DIAGNOSIS — E78.5 HYPERLIPIDEMIA, UNSPECIFIED HYPERLIPIDEMIA TYPE: ICD-10-CM

## 2024-06-24 DIAGNOSIS — R59.1 LYMPHADENOPATHY: ICD-10-CM

## 2024-06-24 DIAGNOSIS — I15.9 SECONDARY HYPERTENSION: ICD-10-CM

## 2024-06-24 LAB
ALBUMIN SERPL BCP-MCNC: 4.3 G/DL (ref 3.4–5)
ALP SERPL-CCNC: 58 U/L (ref 33–136)
ALT SERPL W P-5'-P-CCNC: 27 U/L (ref 7–45)
ANION GAP SERPL CALC-SCNC: 12 MMOL/L (ref 10–20)
AST SERPL W P-5'-P-CCNC: 23 U/L (ref 9–39)
BASOPHILS # BLD AUTO: 0.06 X10*3/UL (ref 0–0.1)
BASOPHILS NFR BLD AUTO: 0.6 %
BILIRUB SERPL-MCNC: 0.6 MG/DL (ref 0–1.2)
BUN SERPL-MCNC: 28 MG/DL (ref 6–23)
CALCIUM SERPL-MCNC: 9.3 MG/DL (ref 8.6–10.3)
CHLORIDE SERPL-SCNC: 102 MMOL/L (ref 98–107)
CHOLEST SERPL-MCNC: 150 MG/DL (ref 0–199)
CHOLESTEROL/HDL RATIO: 2.8
CO2 SERPL-SCNC: 30 MMOL/L (ref 21–32)
CREAT SERPL-MCNC: 1.33 MG/DL (ref 0.5–1.05)
EGFRCR SERPLBLD CKD-EPI 2021: 44 ML/MIN/1.73M*2
EOSINOPHIL # BLD AUTO: 0.17 X10*3/UL (ref 0–0.7)
EOSINOPHIL NFR BLD AUTO: 1.7 %
ERYTHROCYTE [DISTWIDTH] IN BLOOD BY AUTOMATED COUNT: 12.7 % (ref 11.5–14.5)
GLUCOSE SERPL-MCNC: 82 MG/DL (ref 74–99)
HCT VFR BLD AUTO: 44.1 % (ref 36–46)
HDLC SERPL-MCNC: 53.7 MG/DL
HGB BLD-MCNC: 15 G/DL (ref 12–16)
IMM GRANULOCYTES # BLD AUTO: 0.04 X10*3/UL (ref 0–0.7)
IMM GRANULOCYTES NFR BLD AUTO: 0.4 % (ref 0–0.9)
LDLC SERPL CALC-MCNC: 80 MG/DL
LYMPHOCYTES # BLD AUTO: 2.06 X10*3/UL (ref 1.2–4.8)
LYMPHOCYTES NFR BLD AUTO: 21 %
MCH RBC QN AUTO: 32.5 PG (ref 26–34)
MCHC RBC AUTO-ENTMCNC: 34 G/DL (ref 32–36)
MCV RBC AUTO: 96 FL (ref 80–100)
MONOCYTES # BLD AUTO: 0.68 X10*3/UL (ref 0.1–1)
MONOCYTES NFR BLD AUTO: 6.9 %
NEUTROPHILS # BLD AUTO: 6.79 X10*3/UL (ref 1.2–7.7)
NEUTROPHILS NFR BLD AUTO: 69.4 %
NON HDL CHOLESTEROL: 96 MG/DL (ref 0–149)
NRBC BLD-RTO: 0 /100 WBCS (ref 0–0)
PLATELET # BLD AUTO: 232 X10*3/UL (ref 150–450)
POTASSIUM SERPL-SCNC: 4.5 MMOL/L (ref 3.5–5.3)
PROT SERPL-MCNC: 6.7 G/DL (ref 6.4–8.2)
RBC # BLD AUTO: 4.62 X10*6/UL (ref 4–5.2)
SODIUM SERPL-SCNC: 139 MMOL/L (ref 136–145)
TRIGL SERPL-MCNC: 84 MG/DL (ref 0–149)
TSH SERPL-ACNC: 2.3 MIU/L (ref 0.44–3.98)
VLDL: 17 MG/DL (ref 0–40)
WBC # BLD AUTO: 9.8 X10*3/UL (ref 4.4–11.3)

## 2024-06-24 PROCEDURE — 84443 ASSAY THYROID STIM HORMONE: CPT

## 2024-06-24 PROCEDURE — 85025 COMPLETE CBC W/AUTO DIFF WBC: CPT

## 2024-06-24 PROCEDURE — 36415 COLL VENOUS BLD VENIPUNCTURE: CPT

## 2024-06-24 PROCEDURE — 80061 LIPID PANEL: CPT

## 2024-06-24 PROCEDURE — 80053 COMPREHEN METABOLIC PANEL: CPT

## 2024-06-25 ENCOUNTER — ANCILLARY PROCEDURE (OUTPATIENT)
Dept: RADIOLOGY | Facility: EXTERNAL LOCATION | Age: 69
End: 2024-06-25
Payer: MEDICARE

## 2024-06-25 DIAGNOSIS — M54.12 RADICULOPATHY, CERVICAL REGION: ICD-10-CM

## 2024-06-25 PROCEDURE — 62321 NJX INTERLAMINAR CRV/THRC: CPT | Performed by: ANESTHESIOLOGY

## 2024-06-25 PROCEDURE — 99152 MOD SED SAME PHYS/QHP 5/>YRS: CPT | Performed by: ANESTHESIOLOGY

## 2024-06-25 NOTE — PROGRESS NOTES
OPERATIVE NOTE  Preprocedure diagnosis: Cervical radic  Postprocedure diagnosis Cervical radic    Procedure performed: QUINTIN  Physician: Lj Delcid MD  Anesthesia: Minimal-moderate IV conscious sedation  Complications: none  Blood loss:  Nil    Clinical note: This is a very pleasant 68 y.o. female with PMH HTN, GERD, obesity who suffers with neck pain here meeting all medical criteria for above-mentioned procedure. Patient's pain stable and persistent from last visit.  Appropriately NPO.  No personal/family hx issues with anesthesia. Denies allergies to Latex, steroids, local anesthetics, or iodine/contrast. Denies being on blood thinners. Not diabetic.  Denies fever, chills, NS, CP, SOB, cough, N/V.    Discussed procedure risks/benefits in detail with patient. Pt meets medical necessity for procedure due to failure of conservative measures. Reviewed procedural risks including bleeding, infection, nerve damage, paralysis. Also reviewed mitigating factors such as screening for infection/blood thinner use, sterile precautions, and image-guidance when applicable. All questions answered. Pt/guardian expressed understanding and choose to proceed      Procedure:    Cervical Epidural Procedure Note    Procedure: The risks and benefits of treatment options and alternatives were discussed with the patient, and consent was obtained for a cervical epidural steroid injection. She wishes to proceed. She was placed in a prone position. With fluoroscopic assistance, the C7-T1 interspace was identified. After prep with chlorhexidine and 1% lidocaine local infiltration, an 18- gauge Touhy needle was inserted coaxially. Using cautious loss of resistance to saline technique, firm loss of resistance was appreciated at 7 cm. Aspiration revealed no cerebrospinal fluid, blood, or air.     3 cc of Omnipaque contrast was injected which revealed spread within the epidural space and a normal bilateral epidurogram on fluoroscopy. This  was confirmed with AP and contralateral oblique views. After negative aspiration, 80 mg triamcinolone + 2 ml NS was easily injected for total of 4 ml injectate.    Pt monitored with pulse ox, NIBP. See nursing flowsheet for VS record (and sedation record if applicable)    There were no complications, and the patient tolerated the procedure well. There was no numbness or weakness at the time of discharge.     Pre-procedure VAS: 2/10  Post-procedure VAS: 1/10    She was instructed to await the effects of the steroid over the next several days, to continue with physical therapy as tolerated, and follow up with me 2 weeks.      Lj Delcid MD  Interventional Pain Physician  [facility]  9:44 AM  06/25/24

## 2024-07-08 ENCOUNTER — APPOINTMENT (OUTPATIENT)
Dept: SURGERY | Facility: CLINIC | Age: 69
End: 2024-07-08
Payer: MEDICARE

## 2024-07-08 VITALS
WEIGHT: 217 LBS | BODY MASS INDEX: 32.05 KG/M2 | HEART RATE: 66 BPM | DIASTOLIC BLOOD PRESSURE: 80 MMHG | SYSTOLIC BLOOD PRESSURE: 133 MMHG

## 2024-07-08 DIAGNOSIS — E04.2 MULTIPLE THYROID NODULES: Primary | ICD-10-CM

## 2024-07-08 PROCEDURE — 1160F RVW MEDS BY RX/DR IN RCRD: CPT | Performed by: SURGERY

## 2024-07-08 PROCEDURE — 88112 CYTOPATH CELL ENHANCE TECH: CPT

## 2024-07-08 PROCEDURE — 1036F TOBACCO NON-USER: CPT | Performed by: SURGERY

## 2024-07-08 PROCEDURE — 10005 FNA BX W/US GDN 1ST LES: CPT | Performed by: SURGERY

## 2024-07-08 PROCEDURE — 3079F DIAST BP 80-89 MM HG: CPT | Performed by: SURGERY

## 2024-07-08 PROCEDURE — 1159F MED LIST DOCD IN RCRD: CPT | Performed by: SURGERY

## 2024-07-08 PROCEDURE — 88173 CYTOPATH EVAL FNA REPORT: CPT

## 2024-07-08 PROCEDURE — 99204 OFFICE O/P NEW MOD 45 MIN: CPT | Performed by: SURGERY

## 2024-07-08 PROCEDURE — 3075F SYST BP GE 130 - 139MM HG: CPT | Performed by: SURGERY

## 2024-07-08 ASSESSMENT — ENCOUNTER SYMPTOMS
ENDOCRINE NEGATIVE: 1
CARDIOVASCULAR NEGATIVE: 1
NEUROLOGICAL NEGATIVE: 1
PSYCHIATRIC NEGATIVE: 1
CONSTITUTIONAL NEGATIVE: 1
NECK STIFFNESS: 1
NECK PAIN: 1
RESPIRATORY NEGATIVE: 1

## 2024-07-08 NOTE — PROGRESS NOTES
Subjective   Patient ID: Alana Ashby is a 68 y.o. female who presents for surgical consultation for incidentally discovered multinodular thyroid.    HPI I saw Mrs. Ashby in surgery clinic today.  She had a CT scan done of her cervical spine which had an incidental finding of a 1.6 cm left-sided thyroid nodule.  This prompted a dedicated thyroid ultrasound on May 30, 2024.  I personally reviewed the images which show a multinodular thyroid gland.  She has a definitive 2.1 cm nodule in the left thyroid lobe that is TI-RADS 4 and corresponds to the area seen on her CT scan.  There is a heterogeneous area of tissue in the right thyroid lobe that could be a nodule versus a small conglomerate of nodules.  This area to me is less distinct.  Certainly the area on the left side would meet criteria for biopsy.    She is clinically and biochemically euthyroid with a TSH of 2.3.    She denies any prior history of thyroid disease.  No anterior cervical neck pain.  No difficulties breathing or swallowing no troubles with her voice.  No personal history of head neck or chest radiation exposure.    Family history-her mother took some sort of thyroid medication but no history of thyroid cancer in the family.    Review of Systems   Constitutional: Negative.    HENT: Negative.     Respiratory: Negative.     Cardiovascular: Negative.    Endocrine: Negative.    Musculoskeletal:  Positive for neck pain and neck stiffness.   Neurological: Negative.    Psychiatric/Behavioral: Negative.         Objective   Physical Exam  Vitals reviewed.   Constitutional:       Appearance: Normal appearance. She is obese.   Eyes:      Comments: No proptosis   Neck:      Vascular: No carotid bruit.      Comments: Her thyroid feels fairly normal.  Difficult to palpate or appreciate any nodules on physical exam.  Trachea midline.  Cardiovascular:      Rate and Rhythm: Normal rate and regular rhythm.      Heart sounds: Normal heart sounds.   Pulmonary:       Effort: No respiratory distress.      Breath sounds: Normal breath sounds. No wheezing or rales.   Musculoskeletal:         General: Normal range of motion.   Lymphadenopathy:      Cervical: No cervical adenopathy.   Skin:     General: Skin is warm and dry.   Neurological:      General: No focal deficit present.      Mental Status: She is alert and oriented to person, place, and time.   Psychiatric:         Mood and Affect: Mood normal.         Behavior: Behavior normal.         Narrative & Impression   Interpreted By:  Anibal Garcia,   STUDY:  US THYROID;  5/30/2024 10:28 am      INDICATION:  Signs/Symptoms:suspicious thyroid nodules.      COMPARISON:  CT cervical spine of 05/24/2024. No prior ultrasound available.      ACCESSION NUMBER(S):  [id number]      ORDERING CLINICIAN:  LAKIA EVANGELISTA      TECHNIQUE:  Multiple ultrasonographic images of the thyroid gland and surrounding  tissues were obtained.      FINDINGS:  PARENCHYMA:  Mildly heterogenous background echogenicity.      SIZE:  RIGHT LOBE:  4.9 x 1.9 x 1.9 cm  LEFT LOBE:  4.8 x 1.8 x 1.5 cm  ISTHMUS:  4 mm in AP diameter      NODULES: (Please note, assessment and description of nodules is per  TI-RADS criteria. Up to 4 total nodules described, which includes  largest and/or most clinically significant based on morphology.) It  is noted that some spongiform and/or cystic nodules may not be  specifically described and are TR category 1 (benign).      NODULE #: 1.      Location: Right thyroid lobe mid aspect  Size: 2.6 x 1.7 x 1 cm  Composition:  Solid or almost completely solid (2)  Echogenicity:  Hyperechoic or isoechoic (1)  Shape:  Wider-than-tall (0)  Margin:  Ill-defined (0)  Echogenic Foci:  None or Large comet-tail artifacts (0)      The total score of this nodule is 3 points, corresponding to a  TI-RADS category  3; (3 points) Mildly suspicious.      NODULE #: 2.      Location: Left thyroid lobe mid aspect  Size: 2.1 x 1.8 x 1.1  cm  Composition: Solid or almost completely solid (2)  Echogenicity: Hyperechoic or isoechoic (1)  Shape:  Wider-than-tall (0)  Margin:  Smooth (0)  Echogenic Foci: Macrocalcifications (1)      The total score of this nodule is 4 points, corresponding to a  TI-RADS category 4; (4-6 points) Moderately suspicious.      Additional subcentimeter hypoechoic TI-RADS 4 nodules include a 0.6  cm nodule of the right thyroid lobe and 0.6 cm nodule of the thyroid  isthmus.      IMPRESSION:  1. Right thyroid lobe 2.6 cm TI-RADS 3 nodule.  2. Left thyroid lobe 2.1 cm TI-RADS 4 nodule.     Patient ID: Alana Ashby is a 68 y.o. female.    Biopsy    Date/Time: 7/8/2024 10:38 AM    Performed by: uSndeep Taylor MD  Authorized by: Sundeep Taylor MD    Consent:     Consent obtained:  Verbal    Consent given by:  Patient    Risks, benefits, and alternatives were discussed: yes      Risks discussed:  Bleeding and pain    Alternatives discussed:  No treatment and observation  Universal protocol:     Procedure explained and questions answered to patient or proxy's satisfaction: yes      Relevant documents present and verified: yes      Test results available: yes      Imaging studies available: yes      Required blood products, implants, devices, and special equipment available: yes      Site/side marked: no      Immediately prior to procedure, a time out was called: yes      Patient identity confirmed:  Verbally with patient  Pre-procedure details:     Skin preparation:  Povidone-iodine  Sedation:     Sedation type:  None  Anesthesia:     Anesthesia method:  Local infiltration    Local anesthetic:  Lidocaine 2% w/o epi and lidocaine 1% w/o epi  Procedure specific details:      Ultrasound-guided thyroid biopsy    Prior to the biopsy I did a quick screening ultrasound of the patient's thyroid.  We did confirm a 2 cm hypoechoic TI-RADS 4 nodule with some mild calcification present which is a macrocalcification.  In the right thyroid lobe  I see a more distinct 1.7 x 0.9 cm nodule which would be isoechoic to surrounding thyroid tissue as TI-RADS 3.  This area does not require biopsy.  Therefore, today in the office I did an ultrasound-guided biopsy of the patient's left-sided TI-RADS 4 nodule.  This was done under sterile conditions with 1% lidocaine for pain control.  Needle placement into the nodule was done utilizing real-time ultrasound guidance with a 6-15 MHz linear ultrasound probe.  2 separate samples were taken.  Images were captured.  Patient tolerated the biopsy well.  Post-procedure details:     Procedure completion:  Tolerated well, no immediate complications    Assessment/Plan    Mrs. Ashby had incidental discovery of nodular thyroid disease during a cervical C-spine examination.  She had a left-sided thyroid nodule seen which prompted a dedicated thyroid ultrasound which confirmed a 2.1 cm left-sided TI-RADS 4 thyroid nodule.  Ultrasound from radiology also reported a 2.6 cm mass in the right lobe.  This area to me looks rather heterogeneous.  It could be a conglomeration of nodules versus heterogeneous tissue.  It is much less distinct.  Based on this I told her I would recommend a biopsy starting on the left side today.  If that came back suspicious, given the right-sided appearance of her thyroid we can do a total thyroidectomy for her.  If it comes back benign we can put her in an ultrasound surveillance program and monitor the right side further to decide if it needs a biopsy in the future.    She was amenable to this plan and biopsy was successfully carried out today.    She has no obvious family history for thyroid cancer no personal exposure risk for thyroid cancer no compressive symptoms in her neck.    Plan    1.  I told her I will call her toward the end of the week with biopsy reports.  Any further decision for surgical excision versus ongoing radiographic evaluation will be based on biopsy reports.                 Sundeep FORRESTER  MD [place] 07/08/24 10:18 AM

## 2024-07-08 NOTE — LETTER
July 8, 2024     Eulalia Greene DO  [address]  [place] [address]    Patient: Alana Ashby   YOB: 1955   Date of Visit: 7/8/2024       Dear Dr. Eulalia Greene DO:    Thank you for referring Alana Ashby to me for evaluation. Below are my notes for this consultation.  If you have questions, please do not hesitate to call me. I look forward to following your patient along with you.       Sincerely,     Sundeep Taylor MD      CC: No Recipients  ______________________________________________________________________________________    Subjective  Patient ID: Alana Ashby is a 68 y.o. female who presents for surgical consultation for incidentally discovered multinodular thyroid.    HPI I saw Mrs. Ashby in surgery clinic today.  She had a CT scan done of her cervical spine which had an incidental finding of a 1.6 cm left-sided thyroid nodule.  This prompted a dedicated thyroid ultrasound on May 30, 2024.  I personally reviewed the images which show a multinodular thyroid gland.  She has a definitive 2.1 cm nodule in the left thyroid lobe that is TI-RADS 4 and corresponds to the area seen on her CT scan.  There is a heterogeneous area of tissue in the right thyroid lobe that could be a nodule versus a small conglomerate of nodules.  This area to me is less distinct.  Certainly the area on the left side would meet criteria for biopsy.    She is clinically and biochemically euthyroid with a TSH of 2.3.    She denies any prior history of thyroid disease.  No anterior cervical neck pain.  No difficulties breathing or swallowing no troubles with her voice.  No personal history of head neck or chest radiation exposure.    Family history-her mother took some sort of thyroid medication but no history of thyroid cancer in the family.    Review of Systems   Constitutional: Negative.    HENT: Negative.     Respiratory: Negative.     Cardiovascular: Negative.    Endocrine: Negative.    Musculoskeletal:   Positive for neck pain and neck stiffness.   Neurological: Negative.    Psychiatric/Behavioral: Negative.         Objective  Physical Exam  Vitals reviewed.   Constitutional:       Appearance: Normal appearance. She is obese.   Eyes:      Comments: No proptosis   Neck:      Vascular: No carotid bruit.      Comments: Her thyroid feels fairly normal.  Difficult to palpate or appreciate any nodules on physical exam.  Trachea midline.  Cardiovascular:      Rate and Rhythm: Normal rate and regular rhythm.      Heart sounds: Normal heart sounds.   Pulmonary:      Effort: No respiratory distress.      Breath sounds: Normal breath sounds. No wheezing or rales.   Musculoskeletal:         General: Normal range of motion.   Lymphadenopathy:      Cervical: No cervical adenopathy.   Skin:     General: Skin is warm and dry.   Neurological:      General: No focal deficit present.      Mental Status: She is alert and oriented to person, place, and time.   Psychiatric:         Mood and Affect: Mood normal.         Behavior: Behavior normal.         Narrative & Impression   Interpreted By:  Anibal Garcia,   STUDY:  US THYROID;  5/30/2024 10:28 am      INDICATION:  Signs/Symptoms:suspicious thyroid nodules.      COMPARISON:  CT cervical spine of 05/24/2024. No prior ultrasound available.      ACCESSION NUMBER(S):  [id number]      ORDERING CLINICIAN:  LAKIA EVANGELISTA      TECHNIQUE:  Multiple ultrasonographic images of the thyroid gland and surrounding  tissues were obtained.      FINDINGS:  PARENCHYMA:  Mildly heterogenous background echogenicity.      SIZE:  RIGHT LOBE:  4.9 x 1.9 x 1.9 cm  LEFT LOBE:  4.8 x 1.8 x 1.5 cm  ISTHMUS:  4 mm in AP diameter      NODULES: (Please note, assessment and description of nodules is per  TI-RADS criteria. Up to 4 total nodules described, which includes  largest and/or most clinically significant based on morphology.) It  is noted that some spongiform and/or cystic nodules may not  be  specifically described and are TR category 1 (benign).      NODULE #: 1.      Location: Right thyroid lobe mid aspect  Size: 2.6 x 1.7 x 1 cm  Composition:  Solid or almost completely solid (2)  Echogenicity:  Hyperechoic or isoechoic (1)  Shape:  Wider-than-tall (0)  Margin:  Ill-defined (0)  Echogenic Foci:  None or Large comet-tail artifacts (0)      The total score of this nodule is 3 points, corresponding to a  TI-RADS category  3; (3 points) Mildly suspicious.      NODULE #: 2.      Location: Left thyroid lobe mid aspect  Size: 2.1 x 1.8 x 1.1 cm  Composition: Solid or almost completely solid (2)  Echogenicity: Hyperechoic or isoechoic (1)  Shape:  Wider-than-tall (0)  Margin:  Smooth (0)  Echogenic Foci: Macrocalcifications (1)      The total score of this nodule is 4 points, corresponding to a  TI-RADS category 4; (4-6 points) Moderately suspicious.      Additional subcentimeter hypoechoic TI-RADS 4 nodules include a 0.6  cm nodule of the right thyroid lobe and 0.6 cm nodule of the thyroid  isthmus.      IMPRESSION:  1. Right thyroid lobe 2.6 cm TI-RADS 3 nodule.  2. Left thyroid lobe 2.1 cm TI-RADS 4 nodule.     Patient ID: Alana Ashby is a 68 y.o. female.    Biopsy    Date/Time: 7/8/2024 10:38 AM    Performed by: Sundeep Taylor MD  Authorized by: Sundeep Taylor MD    Consent:     Consent obtained:  Verbal    Consent given by:  Patient    Risks, benefits, and alternatives were discussed: yes      Risks discussed:  Bleeding and pain    Alternatives discussed:  No treatment and observation  Universal protocol:     Procedure explained and questions answered to patient or proxy's satisfaction: yes      Relevant documents present and verified: yes      Test results available: yes      Imaging studies available: yes      Required blood products, implants, devices, and special equipment available: yes      Site/side marked: no      Immediately prior to procedure, a time out was called: yes      Patient  identity confirmed:  Verbally with patient  Pre-procedure details:     Skin preparation:  Povidone-iodine  Sedation:     Sedation type:  None  Anesthesia:     Anesthesia method:  Local infiltration    Local anesthetic:  Lidocaine 2% w/o epi and lidocaine 1% w/o epi  Procedure specific details:      Ultrasound-guided thyroid biopsy    Prior to the biopsy I did a quick screening ultrasound of the patient's thyroid.  We did confirm a 2 cm hypoechoic TI-RADS 4 nodule with some mild calcification present which is a macrocalcification.  In the right thyroid lobe I see a more distinct 1.7 x 0.9 cm nodule which would be isoechoic to surrounding thyroid tissue as TI-RADS 3.  This area does not require biopsy.  Therefore, today in the office I did an ultrasound-guided biopsy of the patient's left-sided TI-RADS 4 nodule.  This was done under sterile conditions with 1% lidocaine for pain control.  Needle placement into the nodule was done utilizing real-time ultrasound guidance with a 6-15 MHz linear ultrasound probe.  2 separate samples were taken.  Images were captured.  Patient tolerated the biopsy well.  Post-procedure details:     Procedure completion:  Tolerated well, no immediate complications    Assessment/Plan   Mrs. Ashby had incidental discovery of nodular thyroid disease during a cervical C-spine examination.  She had a left-sided thyroid nodule seen which prompted a dedicated thyroid ultrasound which confirmed a 2.1 cm left-sided TI-RADS 4 thyroid nodule.  Ultrasound from radiology also reported a 2.6 cm mass in the right lobe.  This area to me looks rather heterogeneous.  It could be a conglomeration of nodules versus heterogeneous tissue.  It is much less distinct.  Based on this I told her I would recommend a biopsy starting on the left side today.  If that came back suspicious, given the right-sided appearance of her thyroid we can do a total thyroidectomy for her.  If it comes back benign we can put her in an  ultrasound surveillance program and monitor the right side further to decide if it needs a biopsy in the future.    She was amenable to this plan and biopsy was successfully carried out today.    She has no obvious family history for thyroid cancer no personal exposure risk for thyroid cancer no compressive symptoms in her neck.    Plan    1.  I told her I will call her toward the end of the week with biopsy reports.  Any further decision for surgical excision versus ongoing radiographic evaluation will be based on biopsy reports.                 Sundeep Taylor MD 07/08/24 10:18 AM

## 2024-07-11 ENCOUNTER — TELEPHONE (OUTPATIENT)
Dept: SURGICAL ONCOLOGY | Facility: HOSPITAL | Age: 69
End: 2024-07-11
Payer: MEDICARE

## 2024-07-11 LAB
LABORATORY COMMENT REPORT: NORMAL
LABORATORY COMMENT REPORT: NORMAL
PATH REPORT.COMMENTS IMP SPEC: NORMAL
PATH REPORT.FINAL DX SPEC: NORMAL
PATH REPORT.GROSS SPEC: NORMAL
PATH REPORT.RELEVANT HX SPEC: NORMAL
PATH REPORT.TOTAL CANCER: NORMAL
RESIDENT REVIEW: NORMAL

## 2024-07-11 NOTE — TELEPHONE ENCOUNTER
Result Communication-I notified patient that her biopsy came back positive for papillary thyroid cancer.  Given her multinodular thyroid gland on ultrasound, I told her I would recommend a total thyroidectomy.  We reviewed the operation.  She said that she is willing to move forward with this.  She would like to have her surgery done at [facility].  I will have my office staff contact her to select a surgical date for total thyroidectomy at the [facility] in August 2024.    Resulted Orders   Cytology Consultation (Non-Gynecologic)   Result Value Ref Range    Case Report       Non-gynecologic Cytology                          Case: [id number]                                   Authorizing Provider:  Sundeep Taylor MD        Collected:           07/08/2024 1052              Ordering Location:     [facility]   Received:            07/08/2024 1052                                     Center                                                                       Pathologist:           Estefania Woodruff MD                                                         Specimen:    THYROID FINE NEEDLE ASPIRATION LEFT MID LOBE                                               Final Cytological Interpretation         A. THYROID FINE NEEDLE ASPIRATION LEFT MID LOBE          Cytologic findings suspicious for papillary thyroid carcinoma.    See comment.                 Slide(s) initially screened by VIVIAN Ruff at [facility] [address] [facility] [id number]  By the signature on this report, the individual or group listed as making the Final Interpretation/Diagnosis certifies that they have reviewed this case.       Resident Review       Cleveland Peck MD        Comment       Molecular testing can be performed on the current specimen on clinician request.  Dr. HALIMA Castro has reviewed the slides and concurs with the diagnosis.      Clinical History        68-year-old  woman with 2.1 cm left-sided thyroid nodule, TI-RADS 4.      Specimen Description       A. THYROID FINE NEEDLE ASPIRATION LEFT MID LOBE.  Received 4 direct smears (2 air-dried Diff-Quik and 2 spray-fixed) and 30 ml red clear needle rinse in Cytolyt with particles.         Specimen Processing Detail       A1 Slides Only (No Block)   A1-1 Diff Quik Stain Smear NGYN   A1-2 Diff Quik Stain Smear NGYN   A1-3 Pap Stain Smear NGYN   A1-4 Pap Stain Smear NGYN   A1-5 Pap Stain NGYN ThinPrep          4:17 PM      Results were successfully communicated with the patient and they acknowledged their understanding.

## 2024-07-25 DIAGNOSIS — Z00.00 HEALTHCARE MAINTENANCE: ICD-10-CM

## 2024-07-25 DIAGNOSIS — R23.3 EASY BRUISING: ICD-10-CM

## 2024-07-25 DIAGNOSIS — E04.2 MULTIPLE THYROID NODULES: ICD-10-CM

## 2024-07-25 DIAGNOSIS — Z01.818 PREOPERATIVE TESTING: ICD-10-CM

## 2024-07-29 DIAGNOSIS — Z12.31 ENCOUNTER FOR SCREENING MAMMOGRAM FOR MALIGNANT NEOPLASM OF BREAST: ICD-10-CM

## 2024-07-30 ENCOUNTER — HOSPITAL ENCOUNTER (OUTPATIENT)
Dept: CARDIOLOGY | Facility: HOSPITAL | Age: 69
Discharge: HOME | End: 2024-07-30
Payer: MEDICARE

## 2024-07-30 ENCOUNTER — LAB (OUTPATIENT)
Dept: LAB | Facility: LAB | Age: 69
End: 2024-07-30
Payer: MEDICARE

## 2024-07-30 DIAGNOSIS — Z01.818 PREOPERATIVE TESTING: ICD-10-CM

## 2024-07-30 DIAGNOSIS — Z00.00 HEALTHCARE MAINTENANCE: ICD-10-CM

## 2024-07-30 DIAGNOSIS — R23.3 EASY BRUISING: ICD-10-CM

## 2024-07-30 DIAGNOSIS — E04.2 MULTIPLE THYROID NODULES: ICD-10-CM

## 2024-07-30 LAB
ALBUMIN SERPL BCP-MCNC: 3.9 G/DL (ref 3.4–5)
ALP SERPL-CCNC: 63 U/L (ref 33–136)
ALT SERPL W P-5'-P-CCNC: 20 U/L (ref 7–45)
ANION GAP SERPL CALC-SCNC: 11 MMOL/L (ref 10–20)
APTT PPP: 31 SECONDS (ref 27–38)
AST SERPL W P-5'-P-CCNC: 17 U/L (ref 9–39)
ATRIAL RATE: 87 BPM
BILIRUB SERPL-MCNC: 0.6 MG/DL (ref 0–1.2)
BUN SERPL-MCNC: 17 MG/DL (ref 6–23)
CALCIUM SERPL-MCNC: 9 MG/DL (ref 8.6–10.3)
CHLORIDE SERPL-SCNC: 103 MMOL/L (ref 98–107)
CO2 SERPL-SCNC: 29 MMOL/L (ref 21–32)
CREAT SERPL-MCNC: 1.02 MG/DL (ref 0.5–1.05)
EGFRCR SERPLBLD CKD-EPI 2021: 60 ML/MIN/1.73M*2
ERYTHROCYTE [DISTWIDTH] IN BLOOD BY AUTOMATED COUNT: 13 % (ref 11.5–14.5)
GLUCOSE SERPL-MCNC: 96 MG/DL (ref 74–99)
HCT VFR BLD AUTO: 38.2 % (ref 36–46)
HGB BLD-MCNC: 12.8 G/DL (ref 12–16)
INR PPP: 1 (ref 0.9–1.1)
MCH RBC QN AUTO: 32.4 PG (ref 26–34)
MCHC RBC AUTO-ENTMCNC: 33.5 G/DL (ref 32–36)
MCV RBC AUTO: 97 FL (ref 80–100)
NRBC BLD-RTO: 0 /100 WBCS (ref 0–0)
P AXIS: 59 DEGREES
P OFFSET: 197 MS
P ONSET: 147 MS
PLATELET # BLD AUTO: 205 X10*3/UL (ref 150–450)
POTASSIUM SERPL-SCNC: 4.2 MMOL/L (ref 3.5–5.3)
PR INTERVAL: 166 MS
PROT SERPL-MCNC: 6.3 G/DL (ref 6.4–8.2)
PROTHROMBIN TIME: 11.3 SECONDS (ref 9.8–12.8)
Q ONSET: 230 MS
QRS COUNT: 14 BEATS
QRS DURATION: 70 MS
QT INTERVAL: 348 MS
QTC CALCULATION(BAZETT): 418 MS
QTC FREDERICIA: 393 MS
R AXIS: -21 DEGREES
RBC # BLD AUTO: 3.95 X10*6/UL (ref 4–5.2)
SODIUM SERPL-SCNC: 139 MMOL/L (ref 136–145)
T AXIS: 52 DEGREES
T OFFSET: 404 MS
VENTRICULAR RATE: 87 BPM
WBC # BLD AUTO: 7.2 X10*3/UL (ref 4.4–11.3)

## 2024-07-30 PROCEDURE — 93005 ELECTROCARDIOGRAM TRACING: CPT

## 2024-07-30 PROCEDURE — 36415 COLL VENOUS BLD VENIPUNCTURE: CPT

## 2024-07-30 PROCEDURE — 80053 COMPREHEN METABOLIC PANEL: CPT

## 2024-07-30 PROCEDURE — 85730 THROMBOPLASTIN TIME PARTIAL: CPT

## 2024-07-30 PROCEDURE — 85610 PROTHROMBIN TIME: CPT

## 2024-07-30 PROCEDURE — 85027 COMPLETE CBC AUTOMATED: CPT

## 2024-08-05 ENCOUNTER — PREP FOR PROCEDURE (OUTPATIENT)
Dept: SURGICAL ONCOLOGY | Facility: HOSPITAL | Age: 69
End: 2024-08-05
Payer: MEDICARE

## 2024-08-05 DIAGNOSIS — C73 THYROID CANCER (MULTI): Primary | ICD-10-CM

## 2024-08-05 NOTE — H&P
Alana Ashby is a 68 y.o. female who presents  for incidentally discovered multinodular thyroid.   She had a CT scan done of her cervical spine which had an incidental finding of a 1.6 cm left-sided thyroid nodule.  This prompted a dedicated thyroid ultrasound on May 30, 2024.  I personally reviewed the images which show a multinodular thyroid gland.  She has a definitive 2.1 cm nodule in the left thyroid lobe that is TI-RADS 4 and corresponds to the area seen on her CT scan.  There is a heterogeneous area of tissue in the right thyroid lobe that could be a nodule versus a small conglomerate of nodules.  This area to me is less distinct.  Certainly the area on the left side would meet criteria for biopsy.     She is clinically and biochemically euthyroid with a TSH of 2.3.     She denies any prior history of thyroid disease.  No anterior cervical neck pain.  No difficulties breathing or swallowing no troubles with her voice.  No personal history of head neck or chest radiation exposure.     Family history-her mother took some sort of thyroid medication but no history of thyroid cancer in the family.     Review of Systems   Constitutional: Negative.    HENT: Negative.     Respiratory: Negative.     Cardiovascular: Negative.    Endocrine: Negative.    Musculoskeletal:  Positive for neck pain and neck stiffness.   Neurological: Negative.    Psychiatric/Behavioral: Negative.                 Objective  Physical Exam  Vitals reviewed.   Constitutional:       Appearance: Normal appearance. She is obese.   Eyes:      Comments: No proptosis   Neck:      Vascular: No carotid bruit.      Comments: Her thyroid feels fairly normal.  Difficult to palpate or appreciate any nodules on physical exam.  Trachea midline.  Cardiovascular:      Rate and Rhythm: Normal rate and regular rhythm.      Heart sounds: Normal heart sounds.   Pulmonary:      Effort: No respiratory distress.      Breath sounds: Normal breath sounds. No wheezing or  rales.   Musculoskeletal:         General: Normal range of motion.   Lymphadenopathy:      Cervical: No cervical adenopathy.   Skin:     General: Skin is warm and dry.   Neurological:      General: No focal deficit present.      Mental Status: She is alert and oriented to person, place, and time.   Psychiatric:         Mood and Affect: Mood normal.         Behavior: Behavior normal.            Narrative & Impression   Interpreted By:  Anibal Garcia,   STUDY:  US THYROID;  5/30/2024 10:28 am      INDICATION:  Signs/Symptoms:suspicious thyroid nodules.      COMPARISON:  CT cervical spine of 05/24/2024. No prior ultrasound available.      ACCESSION NUMBER(S):  [id number]      ORDERING CLINICIAN:  LAKIA EVANGELISTA      TECHNIQUE:  Multiple ultrasonographic images of the thyroid gland and surrounding  tissues were obtained.      FINDINGS:  PARENCHYMA:  Mildly heterogenous background echogenicity.      SIZE:  RIGHT LOBE:  4.9 x 1.9 x 1.9 cm  LEFT LOBE:  4.8 x 1.8 x 1.5 cm  ISTHMUS:  4 mm in AP diameter      NODULES: (Please note, assessment and description of nodules is per  TI-RADS criteria. Up to 4 total nodules described, which includes  largest and/or most clinically significant based on morphology.) It  is noted that some spongiform and/or cystic nodules may not be  specifically described and are TR category 1 (benign).      NODULE #: 1.      Location: Right thyroid lobe mid aspect  Size: 2.6 x 1.7 x 1 cm  Composition:  Solid or almost completely solid (2)  Echogenicity:  Hyperechoic or isoechoic (1)  Shape:  Wider-than-tall (0)  Margin:  Ill-defined (0)  Echogenic Foci:  None or Large comet-tail artifacts (0)      The total score of this nodule is 3 points, corresponding to a  TI-RADS category  3; (3 points) Mildly suspicious.      NODULE #: 2.      Location: Left thyroid lobe mid aspect  Size: 2.1 x 1.8 x 1.1 cm  Composition: Solid or almost completely solid (2)  Echogenicity: Hyperechoic or isoechoic  (1)  Shape:  Wider-than-tall (0)  Margin:  Smooth (0)  Echogenic Foci: Macrocalcifications (1)      The total score of this nodule is 4 points, corresponding to a  TI-RADS category 4; (4-6 points) Moderately suspicious.      Additional subcentimeter hypoechoic TI-RADS 4 nodules include a 0.6  cm nodule of the right thyroid lobe and 0.6 cm nodule of the thyroid  isthmus.      IMPRESSION:  1. Right thyroid lobe 2.6 cm TI-RADS 3 nodule.  2. Left thyroid lobe 2.1 cm TI-RADS 4 nodule.     Biopsy of her left-sided TI-RADS 4 nodule did come back positive for papillary thyroid cancer.  Given the multinodular quality to her thyroid I told her I would recommend a total thyroidectomy.  Risk benefits of surgery including lifelong need for thyroid hormone replacement were discussed with the patient.  She agreed and would like to move forward with surgery.    Plan    1.  I am scheduling her for total thyroidectomy at [facility] on Wednesday, August 21, 2024.  My office will reach out to her the day before surgery with what time to arrive.    2.  I will have my nurse send her information for preoperative laboratory testing and EKG in preparation for general anesthesia for surgery.    We will plan to keep her overnight for observation and calcium monitoring.

## 2024-08-05 NOTE — H&P (VIEW-ONLY)
Alana Ashby is a 68 y.o. female who presents  for incidentally discovered multinodular thyroid.   She had a CT scan done of her cervical spine which had an incidental finding of a 1.6 cm left-sided thyroid nodule.  This prompted a dedicated thyroid ultrasound on May 30, 2024.  I personally reviewed the images which show a multinodular thyroid gland.  She has a definitive 2.1 cm nodule in the left thyroid lobe that is TI-RADS 4 and corresponds to the area seen on her CT scan.  There is a heterogeneous area of tissue in the right thyroid lobe that could be a nodule versus a small conglomerate of nodules.  This area to me is less distinct.  Certainly the area on the left side would meet criteria for biopsy.     She is clinically and biochemically euthyroid with a TSH of 2.3.     She denies any prior history of thyroid disease.  No anterior cervical neck pain.  No difficulties breathing or swallowing no troubles with her voice.  No personal history of head neck or chest radiation exposure.     Family history-her mother took some sort of thyroid medication but no history of thyroid cancer in the family.     Review of Systems   Constitutional: Negative.    HENT: Negative.     Respiratory: Negative.     Cardiovascular: Negative.    Endocrine: Negative.    Musculoskeletal:  Positive for neck pain and neck stiffness.   Neurological: Negative.    Psychiatric/Behavioral: Negative.                 Objective  Physical Exam  Vitals reviewed.   Constitutional:       Appearance: Normal appearance. She is obese.   Eyes:      Comments: No proptosis   Neck:      Vascular: No carotid bruit.      Comments: Her thyroid feels fairly normal.  Difficult to palpate or appreciate any nodules on physical exam.  Trachea midline.  Cardiovascular:      Rate and Rhythm: Normal rate and regular rhythm.      Heart sounds: Normal heart sounds.   Pulmonary:      Effort: No respiratory distress.      Breath sounds: Normal breath sounds. No wheezing or  rales.   Musculoskeletal:         General: Normal range of motion.   Lymphadenopathy:      Cervical: No cervical adenopathy.   Skin:     General: Skin is warm and dry.   Neurological:      General: No focal deficit present.      Mental Status: She is alert and oriented to person, place, and time.   Psychiatric:         Mood and Affect: Mood normal.         Behavior: Behavior normal.            Narrative & Impression   Interpreted By:  Anibal Garcia,   STUDY:  US THYROID;  5/30/2024 10:28 am      INDICATION:  Signs/Symptoms:suspicious thyroid nodules.      COMPARISON:  CT cervical spine of 05/24/2024. No prior ultrasound available.      ACCESSION NUMBER(S):  [id number]      ORDERING CLINICIAN:  LAKIA EVANGELISTA      TECHNIQUE:  Multiple ultrasonographic images of the thyroid gland and surrounding  tissues were obtained.      FINDINGS:  PARENCHYMA:  Mildly heterogenous background echogenicity.      SIZE:  RIGHT LOBE:  4.9 x 1.9 x 1.9 cm  LEFT LOBE:  4.8 x 1.8 x 1.5 cm  ISTHMUS:  4 mm in AP diameter      NODULES: (Please note, assessment and description of nodules is per  TI-RADS criteria. Up to 4 total nodules described, which includes  largest and/or most clinically significant based on morphology.) It  is noted that some spongiform and/or cystic nodules may not be  specifically described and are TR category 1 (benign).      NODULE #: 1.      Location: Right thyroid lobe mid aspect  Size: 2.6 x 1.7 x 1 cm  Composition:  Solid or almost completely solid (2)  Echogenicity:  Hyperechoic or isoechoic (1)  Shape:  Wider-than-tall (0)  Margin:  Ill-defined (0)  Echogenic Foci:  None or Large comet-tail artifacts (0)      The total score of this nodule is 3 points, corresponding to a  TI-RADS category  3; (3 points) Mildly suspicious.      NODULE #: 2.      Location: Left thyroid lobe mid aspect  Size: 2.1 x 1.8 x 1.1 cm  Composition: Solid or almost completely solid (2)  Echogenicity: Hyperechoic or isoechoic  (1)  Shape:  Wider-than-tall (0)  Margin:  Smooth (0)  Echogenic Foci: Macrocalcifications (1)      The total score of this nodule is 4 points, corresponding to a  TI-RADS category 4; (4-6 points) Moderately suspicious.      Additional subcentimeter hypoechoic TI-RADS 4 nodules include a 0.6  cm nodule of the right thyroid lobe and 0.6 cm nodule of the thyroid  isthmus.      IMPRESSION:  1. Right thyroid lobe 2.6 cm TI-RADS 3 nodule.  2. Left thyroid lobe 2.1 cm TI-RADS 4 nodule.     Biopsy of her left-sided TI-RADS 4 nodule did come back positive for papillary thyroid cancer.  Given the multinodular quality to her thyroid I told her I would recommend a total thyroidectomy.  Risk benefits of surgery including lifelong need for thyroid hormone replacement were discussed with the patient.  She agreed and would like to move forward with surgery.    Plan    1.  I am scheduling her for total thyroidectomy at [facility] on Wednesday, August 21, 2024.  My office will reach out to her the day before surgery with what time to arrive.    2.  I will have my nurse send her information for preoperative laboratory testing and EKG in preparation for general anesthesia for surgery.    We will plan to keep her overnight for observation and calcium monitoring.

## 2024-08-07 ENCOUNTER — TELEPHONE (OUTPATIENT)
Dept: SURGERY | Facility: CLINIC | Age: 69
End: 2024-08-07
Payer: MEDICARE

## 2024-08-07 NOTE — TELEPHONE ENCOUNTER
Called patient to confirm OR date 8/21/24. Pre-op labs fand EKG completed.  Patient would like to receive pre-op instructions via email and address confirmed. All questions addressed. Callback number provided.

## 2024-08-12 ENCOUNTER — OFFICE VISIT (OUTPATIENT)
Dept: PAIN MEDICINE | Facility: CLINIC | Age: 69
End: 2024-08-12
Payer: MEDICARE

## 2024-08-12 VITALS
SYSTOLIC BLOOD PRESSURE: 111 MMHG | DIASTOLIC BLOOD PRESSURE: 79 MMHG | WEIGHT: 217 LBS | HEART RATE: 87 BPM | RESPIRATION RATE: 18 BRPM | HEIGHT: 69 IN | BODY MASS INDEX: 32.14 KG/M2

## 2024-08-12 DIAGNOSIS — M54.12 CERVICAL RADICULITIS: ICD-10-CM

## 2024-08-12 DIAGNOSIS — M96.1 POSTLAMINECTOMY SYNDROME OF LUMBAR REGION: Primary | ICD-10-CM

## 2024-08-12 PROCEDURE — 1125F AMNT PAIN NOTED PAIN PRSNT: CPT | Performed by: ANESTHESIOLOGY

## 2024-08-12 PROCEDURE — 1160F RVW MEDS BY RX/DR IN RCRD: CPT | Performed by: ANESTHESIOLOGY

## 2024-08-12 PROCEDURE — 3074F SYST BP LT 130 MM HG: CPT | Performed by: ANESTHESIOLOGY

## 2024-08-12 PROCEDURE — 1159F MED LIST DOCD IN RCRD: CPT | Performed by: ANESTHESIOLOGY

## 2024-08-12 PROCEDURE — 99214 OFFICE O/P EST MOD 30 MIN: CPT | Performed by: ANESTHESIOLOGY

## 2024-08-12 PROCEDURE — 3078F DIAST BP <80 MM HG: CPT | Performed by: ANESTHESIOLOGY

## 2024-08-12 PROCEDURE — 3008F BODY MASS INDEX DOCD: CPT | Performed by: ANESTHESIOLOGY

## 2024-08-12 ASSESSMENT — PAIN SCALES - GENERAL
PAINLEVEL: 4
PAINLEVEL_OUTOF10: 4

## 2024-08-12 ASSESSMENT — ENCOUNTER SYMPTOMS
NUMBNESS: 1
HEMATOLOGIC/LYMPHATIC NEGATIVE: 1
RESPIRATORY NEGATIVE: 1
BACK PAIN: 1
GASTROINTESTINAL NEGATIVE: 1
DEPRESSION: 0
EYES NEGATIVE: 1
ENDOCRINE NEGATIVE: 1
LOSS OF SENSATION IN FEET: 0
OCCASIONAL FEELINGS OF UNSTEADINESS: 0
PSYCHIATRIC NEGATIVE: 1
CARDIOVASCULAR NEGATIVE: 1
CONSTITUTIONAL NEGATIVE: 1

## 2024-08-12 ASSESSMENT — LIFESTYLE VARIABLES: TOTAL SCORE: 0

## 2024-08-12 ASSESSMENT — PAIN - FUNCTIONAL ASSESSMENT: PAIN_FUNCTIONAL_ASSESSMENT: 0-10

## 2024-08-12 ASSESSMENT — PAIN DESCRIPTION - DESCRIPTORS: DESCRIPTORS: ACHING;NUMBNESS

## 2024-08-12 NOTE — PROGRESS NOTES
PAIN MANAGEMENT FOLLOW-UP OFFICE NOTE    Date of Service: 8/12/2024    SUBJECTIVE    CHIEF COMPLAINT: LBP    HISTORY OF PRESENT ILLNESS    Alana Ashby is a 68 y.o. female with PMH HTN, GERD, obesity who presents for F/U LB pain.    Since her last visit, pt has scheduled her thyroidectomy for 8/21. In the meantime, she notes 85% relief of her LB and leg sx since her caudal CHRISTIANA 6/11. She also has 85% ongoing neck pain relief since her QUINTIN on 6/25. Asks about future expectations for low back pain.     Pt denies new-onset weakness, bowel/bladder incontinence.  Pt denies recent infection, allergy to Latex/iodine/contrast. Patient is currently taking the following blood thinner(s): N/A    Procedure log:  -QUINTIN 6/25/24: 85% relief ongoing  -Caudal CHRISTIANA 6/11/24: 85% relief 1 mo, 75% ongoing      REVIEW OF SYSTEMS  Review of Systems   Constitutional: Negative.    HENT: Negative.     Eyes: Negative.    Respiratory: Negative.     Cardiovascular: Negative.    Gastrointestinal: Negative.    Endocrine: Negative.    Musculoskeletal:  Positive for back pain and gait problem.   Skin: Negative.    Neurological:  Positive for numbness.   Hematological: Negative.    Psychiatric/Behavioral: Negative.         PAST MEDICAL HISTORY  Past Medical History:   Diagnosis Date    Abdominal mass 06/30/2023    Chronic pain disorder 1970?    Elbow pain 06/30/2023    Encounter for screening for malignant neoplasm of colon 03/26/2014    Encounter for screening colonoscopy    Endometriosis 1991    Epigastric pain 06/30/2023    Knee pain 06/30/2023    Low back pain 1970 ?    Neck pain 2019 ?    Other specified diseases of gallbladder 06/26/2014    Biliary dyskinesia    Personal history of colonic polyps 04/24/2014    History of adenomatous polyp of colon    Personal history of other diseases of the digestive system     History of esophageal reflux    Personal history of other endocrine, nutritional and metabolic disease     History of  hypercholesterolemia    Personal history of other specified conditions 06/04/2014    History of nausea    Right upper quadrant pain 07/10/2014    Abdominal pain, RUQ    Thumb pain 06/30/2023     Past Surgical History:   Procedure Laterality Date    BACK SURGERY  12/21/2022    CHOLECYSTECTOMY  07/10/2014    Cholecystectomy Laparoscopic    COLONOSCOPY W/ POLYPECTOMY  04/24/2014    Complete Colonoscopy For Polyp Removal    ESOPHAGOGASTRODUODENOSCOPY  06/04/2014    Diagnostic Esophagogastroduodenoscopy    HYSTERECTOMY  05/13/2013    Hysterectomy    MR HEAD ANGIO WO IV CONTRAST  04/24/2015    MR HEAD ANGIO WO IV CONTRAST 4/24/2015 [facility]    MR NECK ANGIO WO IV CONTRAST  04/24/2015    MR NECK ANGIO WO IV CONTRAST 4/24/2015 [facility]    SPINAL FUSION  12/21/2022     Family History   Problem Relation Name Age of Onset    Cancer Mother Janet Young     Hypertension Mother Janet Young     Diabetes Father Jim Young     Stroke Father Jim Young        CURRENT MEDICATIONS  Current Outpatient Medications   Medication Sig Dispense Refill    atorvastatin (Lipitor) 20 mg tablet Take 1 tablet (20 mg) by mouth once daily. as directed 90 tablet 3    ibuprofen 200 mg tablet Take by mouth every 6 hours if needed for mild pain (1 - 3).      lisinopril 20 mg tablet Take 1 tablet (20 mg) by mouth once daily. as directed 90 tablet 3    omeprazole (PriLOSEC) 40 mg DR capsule Take 1 capsule (40 mg) by mouth once daily in the morning. Take before meals. 90 capsule 3    Pain Reliever, acetaminophen, 500 mg tablet TAKE 1 TABLET BY MOUTH EVERY 4 TO 6 HOURS AS NEEDED      magnesium oxide (Mag-Ox) 400 mg (241.3 mg magnesium) tablet Take 1 tablet (400 mg) by mouth once daily.       No current facility-administered medications for this visit.       ALLERGIES AND DRUG REACTIONS  Allergies   Allergen Reactions    Baclofen Unknown    Gabapentin Other     Memory loss          OBJECTIVE  Visit Vitals  /79   Pulse 87  "  Resp 18   Ht 1.753 m (5' 9\")   Wt 98.4 kg (217 lb)   BMI 32.05 kg/m²   Smoking Status Never   BSA 2.19 m²       Last Recorded Pain Score (if available):                Physical Exam  Vitals and nursing note reviewed.       General: Sitting in chair, NAD  Head: NCAT  Eyes: Sclera/conjunctiva clear, EOMI, PERRL  Nose/mouth: MMM  CV: Good distal pulses  Lungs: Good/equal chest excursion  Abdomen: Soft, ND  Ext: No cyanosis/edema  MSK: C-spine alignment: anterior tilt, BL paraspinal m TTP, c-spine ROM: extension, rotation, flexion lmtd by pain with +Spurling. Neg Lhermitte    L-spine: unremarkable alignment, BL paraspinal m TTP with L-spine TTP over surgical area, L-spine ROM: extension/flexion lmtd by pain    Neuro: AAOx3   Dermatome sensation to light touch  LEFT C5: WNL    RIGHT C5: WNL      LEFT C6: WNL       RIGHT C6: WNL      LEFT C7: WNL       RIGHT C7: WNL      LEFT C8: WNL       RIGHT C8: WNL      LEFT T1: WNL       RIGHT T1: WNL    LEFT L1 (lower pelvis/upper thigh): WNL    RIGHT L1: WNL      LEFT L2 (upper thigh): WNL       RIGHT: L2:WNL      LEFT L3 (medial knee): WNL       RIGHT L3: WNL      LEFT L4 (superior medial malleolus): WNL       RIGHT L4: WNL      LEFT L5 (dorsal foot): WNL       RIGHT L5: WNL      LEFT S1 (lateral foot): WNL     RIGHT S1: WNL      LEFT S2 (popliteal fossa): WNL    RIGHT S2: WNL        Motor strength  LEFT C5 (elbow flexion): 5/5   RIGHT C5: 5/5  LEFT C6 (wrist extension): 5/5     RIGHT C6: 5/5  LEFT C7 (elbow extension): 5/5     RIGHT C7: 5/5  LEFT C8 (finger abduction): 5/5     RIGHT C8: 5/5  LEFT T1 (hand ): 5/5     RIGHT T1: 5/5    LEFT L2 (hip flexion): 5/5   RIGHT L2: 5/5  LEFT L3 (knee extension): 5/5     RIGHT L3: 5/5  LEFT L4 (dorsiflexion): 5/5     RIGHT L4: 5/5  LEFT L5 (EHL extension): 5/5     RIGHT L5: 5/5  LEFT S1 (plantarflexion): 5/5     RIGHT S1: 5/5  LEFT S2 (knee flexion): 5/5      RIGHT S2: 5/5    Special testing  Shira: nola FITZGERALD  DTR " unremarkable      Psych: affect nl  Skin: no rash/lesions      REVIEW OF LABORATORY DATA  I have reviewed the following lab results:  WBC   Date Value Ref Range Status   07/30/2024 7.2 4.4 - 11.3 x10*3/uL Final     RBC   Date Value Ref Range Status   07/30/2024 3.95 (L) 4.00 - 5.20 x10*6/uL Final     Hemoglobin   Date Value Ref Range Status   07/30/2024 12.8 12.0 - 16.0 g/dL Final     Hematocrit   Date Value Ref Range Status   07/30/2024 38.2 36.0 - 46.0 % Final     MCV   Date Value Ref Range Status   07/30/2024 97 80 - 100 fL Final     MCH   Date Value Ref Range Status   07/30/2024 32.4 26.0 - 34.0 pg Final     MCHC   Date Value Ref Range Status   07/30/2024 33.5 32.0 - 36.0 g/dL Final     RDW   Date Value Ref Range Status   07/30/2024 13.0 11.5 - 14.5 % Final     Platelets   Date Value Ref Range Status   07/30/2024 205 150 - 450 x10*3/uL Final     Sodium   Date Value Ref Range Status   07/30/2024 139 136 - 145 mmol/L Final     Potassium   Date Value Ref Range Status   07/30/2024 4.2 3.5 - 5.3 mmol/L Final     Bicarbonate   Date Value Ref Range Status   07/30/2024 29 21 - 32 mmol/L Final     Urea Nitrogen   Date Value Ref Range Status   07/30/2024 17 6 - 23 mg/dL Final     Calcium   Date Value Ref Range Status   07/30/2024 9.0 8.6 - 10.3 mg/dL Final     Protime   Date Value Ref Range Status   07/30/2024 11.3 9.8 - 12.8 seconds Final     INR   Date Value Ref Range Status   07/30/2024 1.0 0.9 - 1.1 Final         REVIEW OF RADIOLOGY   I have reviewed the following:  Radiology Studies      CT c-spine 5/24/24:  No acute fracture or posttraumatic subluxation.      C2-3: No significant central canal or foraminal stenosis.  C3-4: No significant central canal or foraminal stenosis.  C4-5: Uncovertebral osteophytes are noted more on the right side  minimally narrowing the right neuroforamen C5-6: Disc osteophyte  complex indents the ventral thecal sac. Uncovertebral osteophytes  moderately narrow the right and minimally  narrow the left  neuroforamen. C6-7: No significant central canal or foraminal  stenosis. C7-T1: Hypertrophic facet changes are noted minimally  narrowing the neuroforamina.      4 mL indistinct nodule right upper lung image 417 series 201 for  instance incompletely evaluated on the current exam and nonspecific.  1.6 cm hypodense nodule left thyroid lobe with chunky calcification  incidentally noted. The thyroid gland is otherwise heterogeneous in  appearance.          IMPRESSION:  Degenerative changes of the cervical spine as above described worst  at C5-6. If further evaluation of the central canal and neuroforamina  were clinically necessary MRI could be considered.          MRI L-spine 5/23/24:  This report assumes 5 non-rib bearing lumbar vertebral bodies. The  lowest intervertebral disc will be labeled L5-S1.      There are new postoperative changes from right laminectomy at L4-L5  with posterior spinal fusion including placement of bilateral  pedicular screws which terminate within the L4 and L5 vertebral  bodies and are interconnected by parallel rods. There are also  intervertebral disc spacers at L4-L5 and L5-S1. Per clinical note,  patient underwent left L5-S1 laminectomy which is partially  visualized. Current study is not tailored to assess the surgical  hardware.      Grade 1 anterolisthesis at L4-L5 and L5-S1 seen on the prior MRI has  decreased. Otherwise, there is no striking spondylolisthesis.  Vertebral body and remaining intervertebral disc heights are  maintained. There are chronic degenerative endplate changes at few  levels including mild endplate scalloping. Marrow signal is overall  within normal limits. There is a hemangioma within the L1 vertebral  body.      The conus medullaris terminates at the level of L1-L2 and is  unremarkable in appearance.      At T12-L1 and L1-L2, there is no posterior disc contour abnormality.  There is no spinal canal stenosis or neural foraminal  narrowing.  There is no facet osteoarthropathy.      At L2-L3, there is a stable small diffuse disc bulge. There is no  spinal canal stenosis or neural foraminal narrowing. There is no  striking facet osteoarthropathy.      At L3-L4, there is a small diffuse disc bulge with a central annular  fissure which causes mild spinal canal stenosis. There is no neural  foraminal narrowing. There is no facet osteoarthropathy.      At L4-L5, there is no posterior disc contour abnormality. There is no  spinal canal stenosis or neural foraminal narrowing. It is somewhat  difficult to evaluate the facet joints due to susceptibility  artifact, noting this, bilateral facet osteoarthropathy has  decreased, likely sequela of prior facetectomy.      At L5-S1, there is no posterior disc contour abnormality. There is no  spinal canal stenosis or striking neural foraminal narrowing. Partial  visualization of left laminectomy. Bilateral facet osteoarthropathy  has decreased, possibly sequela of recent surgery.      IMPRESSION:  1. Postoperative changes from laminectomies and spinal fusion at  L4-L5 and L5-S1 with resolution of spinal canal stenosis L4-L5.      2. Otherwise, essentially stable multilevel degenerative changes as  detailed above.             XR L-spine 12/78/23:  Laminectomy posterior fusion L4 through S1. Kyphoplasty changes at  the L4 and S1 level. There is mild anterolisthesis L4-L5 and L5 on  S1, similar to the prior. The remainder of the lumbar spine is  unremarkable      IMPRESSION:  Postsurgical changes L4-S1 without evidence of hardware failure or  malalignment.       ASSESSMENT & PLAN  Alana Ashby is a 68 y.o. old female with PMH HTN, GERD, obesity who presents for F/U LB pain     1) Lumbar PLS  -s/p L4-S1 fusion 2022 worsening since 2023 with subj RLE numbness  -Refractive to  >1 y conservative tx including Tylenol, NSAIDs, >6 w PT, gabapentin  -Saw Dr Clements 12/8 where no additional surgery was rec'd  -MRI  "L-spine 5/23/24: L4-S1 lami/fusion stable, multilevel spondylosis featuring L3-4 disc bulge/fissure contributing to mild stenosis  -Caudal CHRISTIANA 6/11/24: 85% ongoing relief  -Residual pain still bothersome and pt would like long-term solution as pain will likely return. [place] SCS work-up ongoing- see discussion below:  --Psych eval  --Consider CT T-spine after recovery from thyroidectomy    2) Neck pain  -Since slip and fall 2016 radiating to BL shoulders with +Spurling, occasional R hand numbness  -Refractive to yrs of conservative tx including heat, Tylenol, NSAIDs, >6 w home exercise program  -CT C-spine 5/24/24: multilevel spondylosis featuring C5-6 disc complex indenting dura with moderate R>L NF stenosis  -C7-T1 CHRISTIANA 6/24/24: 85% ongoing relief          I spent time in the office with the patient explaining the treatment plan, the risks and potential benefits therein and what the diagnostic or F/U plan is for their pain problem moving forward. Under present circumstances, it is very reasonable at this point to offer a trial of spinal cord stimulation (SCS) to see if we improve pain, decrease  medication use and improve function and activity tolerance in a meaningful, long term fashion without the downsides of continued med mgt or opioids for long term, lifetime use.       We discussed the SCS system (electrodes and IPG), how it works, why it works, the published literature on SCS, the variety of programming options, specifically conventional and high frequency and subthreshold options; we discussed some of the pros and cons of these and what might work best for this patient.         We discussed that there are non-recharge and rechargeable batteries and the relative difference in sizes and management over time, depending on the anticipated power use requirements estimated when we do the trial.        We discussed where the electrodes and the IPG (battery) are placed in the body during a \"trial\" of 5-10 days, and " "during \"permanent\" implantation and the differences between these two steps, also, how the electrodes are secured externally during a trial and internally during permanent implantation; the lifestyle limitations with a SCS trial and permanent implant post op, with 6 weeks of no reaching overhead nor bending from the waist and being vigilant for wound infections and hte fact that not all SCS systems are completely MRI compatible; we discussed the risks of infection, electrode migration, pain at the IPG or anchor sites, mechanical or equipment failure, and use of the SCS system for leg and/or back pain, as well as mitigating factors and management strategies if these issues were to occur. We discussed that this is a long term but possibly impermanent pain control system and that in some cases, efficacy diminishes over time. We also briefly discussed the emerging technologies with regard to neuromodulation.       I spent 32 min in consultation with this patient; > 50% was spent in counseling and education on treatment of this pain disorder with SCS, infection risk 2.5% nationally, and rare instances of hardware migration, failure, breakage, loss of battery power, travel restrictions with implanted device, MRI contingencies depending on the device we select together. The patient had optimal time to ask questions during this exchange today.        I provided the patient with patient information materials on SCS and website resources to review online to learn more about SCS (WebMD, spine.org, wikipedia, etc).        Patient will consider these options and f/u with me in the office for further discussion.      If we proceed, will need behavioral evaluation, per protocol, with psych eval (referral provided).  Often, I request a thoracic CT scan to rule out thoracic stenosis in the region the electrodes will be placed, though the need for this is dependent on several factors.  Once done, we can put together a letter of " medical necessity to get the authorization process moving aflong.                    Lj Delcid MD  Anesthesiologist & Interventional Pain Physician   [facility] [place]  O: [phone]  F: [phone]  2:40 PM  08/12/24

## 2024-08-20 ENCOUNTER — ANESTHESIA EVENT (OUTPATIENT)
Dept: OPERATING ROOM | Facility: HOSPITAL | Age: 69
End: 2024-08-20
Payer: MEDICARE

## 2024-08-21 ENCOUNTER — ANESTHESIA (OUTPATIENT)
Dept: OPERATING ROOM | Facility: HOSPITAL | Age: 69
End: 2024-08-21
Payer: MEDICARE

## 2024-08-21 ENCOUNTER — HOSPITAL ENCOUNTER (OUTPATIENT)
Facility: HOSPITAL | Age: 69
Discharge: HOME | End: 2024-08-22
Attending: SURGERY | Admitting: SURGERY
Payer: MEDICARE

## 2024-08-21 DIAGNOSIS — C73 THYROID CANCER (MULTI): Primary | ICD-10-CM

## 2024-08-21 LAB
ABO GROUP (TYPE) IN BLOOD: NORMAL
ANTIBODY SCREEN: NORMAL
RH FACTOR (ANTIGEN D): NORMAL

## 2024-08-21 PROCEDURE — 2500000001 HC RX 250 WO HCPCS SELF ADMINISTERED DRUGS (ALT 637 FOR MEDICARE OP): Performed by: STUDENT IN AN ORGANIZED HEALTH CARE EDUCATION/TRAINING PROGRAM

## 2024-08-21 PROCEDURE — 2500000004 HC RX 250 GENERAL PHARMACY W/ HCPCS (ALT 636 FOR OP/ED)

## 2024-08-21 PROCEDURE — 60240 REMOVAL OF THYROID: CPT | Performed by: SURGERY

## 2024-08-21 PROCEDURE — 2500000004 HC RX 250 GENERAL PHARMACY W/ HCPCS (ALT 636 FOR OP/ED): Mod: JG | Performed by: SURGERY

## 2024-08-21 PROCEDURE — 3600000009 HC OR TIME - EACH INCREMENTAL 1 MINUTE - PROCEDURE LEVEL FOUR: Performed by: SURGERY

## 2024-08-21 PROCEDURE — 2720000007 HC OR 272 NO HCPCS: Performed by: SURGERY

## 2024-08-21 PROCEDURE — 7100000001 HC RECOVERY ROOM TIME - INITIAL BASE CHARGE: Performed by: SURGERY

## 2024-08-21 PROCEDURE — 36415 COLL VENOUS BLD VENIPUNCTURE: CPT | Performed by: STUDENT IN AN ORGANIZED HEALTH CARE EDUCATION/TRAINING PROGRAM

## 2024-08-21 PROCEDURE — 86901 BLOOD TYPING SEROLOGIC RH(D): CPT | Performed by: STUDENT IN AN ORGANIZED HEALTH CARE EDUCATION/TRAINING PROGRAM

## 2024-08-21 PROCEDURE — 2500000005 HC RX 250 GENERAL PHARMACY W/O HCPCS

## 2024-08-21 PROCEDURE — 2500000005 HC RX 250 GENERAL PHARMACY W/O HCPCS: Performed by: SURGERY

## 2024-08-21 PROCEDURE — 2500000002 HC RX 250 W HCPCS SELF ADMINISTERED DRUGS (ALT 637 FOR MEDICARE OP, ALT 636 FOR OP/ED)

## 2024-08-21 PROCEDURE — 3600000004 HC OR TIME - INITIAL BASE CHARGE - PROCEDURE LEVEL FOUR: Performed by: SURGERY

## 2024-08-21 PROCEDURE — 82310 ASSAY OF CALCIUM: CPT | Performed by: STUDENT IN AN ORGANIZED HEALTH CARE EDUCATION/TRAINING PROGRAM

## 2024-08-21 PROCEDURE — 3700000001 HC GENERAL ANESTHESIA TIME - INITIAL BASE CHARGE: Performed by: SURGERY

## 2024-08-21 PROCEDURE — 7100000002 HC RECOVERY ROOM TIME - EACH INCREMENTAL 1 MINUTE: Performed by: SURGERY

## 2024-08-21 PROCEDURE — 7100000011 HC EXTENDED STAY RECOVERY HOURLY - NURSING UNIT

## 2024-08-21 PROCEDURE — 3700000002 HC GENERAL ANESTHESIA TIME - EACH INCREMENTAL 1 MINUTE: Performed by: SURGERY

## 2024-08-21 PROCEDURE — 2500000001 HC RX 250 WO HCPCS SELF ADMINISTERED DRUGS (ALT 637 FOR MEDICARE OP)

## 2024-08-21 RX ORDER — PHENYLEPHRINE HCL IN 0.9% NACL 0.4MG/10ML
SYRINGE (ML) INTRAVENOUS AS NEEDED
Status: DISCONTINUED | OUTPATIENT
Start: 2024-08-21 | End: 2024-08-21

## 2024-08-21 RX ORDER — ONDANSETRON HYDROCHLORIDE 2 MG/ML
INJECTION, SOLUTION INTRAVENOUS AS NEEDED
Status: DISCONTINUED | OUTPATIENT
Start: 2024-08-21 | End: 2024-08-21

## 2024-08-21 RX ORDER — ACETAMINOPHEN 325 MG/1
650 TABLET ORAL EVERY 6 HOURS
Status: DISCONTINUED | OUTPATIENT
Start: 2024-08-21 | End: 2024-08-22 | Stop reason: HOSPADM

## 2024-08-21 RX ORDER — ATORVASTATIN CALCIUM 20 MG/1
20 TABLET, FILM COATED ORAL NIGHTLY
Status: DISCONTINUED | OUTPATIENT
Start: 2024-08-22 | End: 2024-08-22 | Stop reason: HOSPADM

## 2024-08-21 RX ORDER — DEXMEDETOMIDINE HYDROCHLORIDE 4 UG/ML
INJECTION, SOLUTION INTRAVENOUS CONTINUOUS PRN
Status: DISCONTINUED | OUTPATIENT
Start: 2024-08-21 | End: 2024-08-21

## 2024-08-21 RX ORDER — LEVOTHYROXINE SODIUM 125 UG/1
125 TABLET ORAL DAILY
Status: DISCONTINUED | OUTPATIENT
Start: 2024-08-22 | End: 2024-08-22 | Stop reason: HOSPADM

## 2024-08-21 RX ORDER — HYDRALAZINE HYDROCHLORIDE 20 MG/ML
5 INJECTION INTRAMUSCULAR; INTRAVENOUS EVERY 30 MIN PRN
Status: DISCONTINUED | OUTPATIENT
Start: 2024-08-21 | End: 2024-08-21 | Stop reason: HOSPADM

## 2024-08-21 RX ORDER — LIDOCAINE HYDROCHLORIDE 20 MG/ML
INJECTION, SOLUTION INFILTRATION; PERINEURAL AS NEEDED
Status: DISCONTINUED | OUTPATIENT
Start: 2024-08-21 | End: 2024-08-21

## 2024-08-21 RX ORDER — SODIUM CHLORIDE 0.9 G/100ML
IRRIGANT IRRIGATION AS NEEDED
Status: DISCONTINUED | OUTPATIENT
Start: 2024-08-21 | End: 2024-08-21 | Stop reason: HOSPADM

## 2024-08-21 RX ORDER — MULTIVITAMIN/IRON/FOLIC ACID 18MG-0.4MG
1 TABLET ORAL DAILY
COMMUNITY

## 2024-08-21 RX ORDER — OXYCODONE HYDROCHLORIDE 5 MG/1
5 TABLET ORAL EVERY 4 HOURS PRN
Status: DISCONTINUED | OUTPATIENT
Start: 2024-08-21 | End: 2024-08-21 | Stop reason: HOSPADM

## 2024-08-21 RX ORDER — CALCIUM CARBONATE 500(1250)
2500 TABLET ORAL 3 TIMES DAILY PRN
Status: DISCONTINUED | OUTPATIENT
Start: 2024-08-21 | End: 2024-08-22 | Stop reason: HOSPADM

## 2024-08-21 RX ORDER — HYDROMORPHONE HYDROCHLORIDE 1 MG/ML
INJECTION, SOLUTION INTRAMUSCULAR; INTRAVENOUS; SUBCUTANEOUS AS NEEDED
Status: DISCONTINUED | OUTPATIENT
Start: 2024-08-21 | End: 2024-08-21

## 2024-08-21 RX ORDER — SODIUM CHLORIDE, SODIUM LACTATE, POTASSIUM CHLORIDE, CALCIUM CHLORIDE 600; 310; 30; 20 MG/100ML; MG/100ML; MG/100ML; MG/100ML
100 INJECTION, SOLUTION INTRAVENOUS CONTINUOUS
Status: DISCONTINUED | OUTPATIENT
Start: 2024-08-21 | End: 2024-08-21 | Stop reason: HOSPADM

## 2024-08-21 RX ORDER — MIDAZOLAM HYDROCHLORIDE 1 MG/ML
INJECTION INTRAMUSCULAR; INTRAVENOUS CONTINUOUS PRN
Status: DISCONTINUED | OUTPATIENT
Start: 2024-08-21 | End: 2024-08-21

## 2024-08-21 RX ORDER — DROPERIDOL 2.5 MG/ML
0.62 INJECTION, SOLUTION INTRAMUSCULAR; INTRAVENOUS ONCE AS NEEDED
Status: DISCONTINUED | OUTPATIENT
Start: 2024-08-21 | End: 2024-08-21 | Stop reason: HOSPADM

## 2024-08-21 RX ORDER — ONDANSETRON HYDROCHLORIDE 2 MG/ML
4 INJECTION, SOLUTION INTRAVENOUS ONCE AS NEEDED
Status: DISCONTINUED | OUTPATIENT
Start: 2024-08-21 | End: 2024-08-21 | Stop reason: HOSPADM

## 2024-08-21 RX ORDER — CALCIUM GLUCONATE 20 MG/ML
1 INJECTION, SOLUTION INTRAVENOUS 3 TIMES DAILY PRN
Status: DISCONTINUED | OUTPATIENT
Start: 2024-08-21 | End: 2024-08-22 | Stop reason: HOSPADM

## 2024-08-21 RX ORDER — CALCIUM CARBONATE 1250 MG/5ML
2500 SUSPENSION ORAL 3 TIMES DAILY PRN
Status: DISCONTINUED | OUTPATIENT
Start: 2024-08-21 | End: 2024-08-22 | Stop reason: HOSPADM

## 2024-08-21 RX ORDER — LIDOCAINE HYDROCHLORIDE 10 MG/ML
0.1 INJECTION INFILTRATION; PERINEURAL ONCE
Status: DISCONTINUED | OUTPATIENT
Start: 2024-08-21 | End: 2024-08-21 | Stop reason: HOSPADM

## 2024-08-21 RX ORDER — FENTANYL CITRATE 50 UG/ML
INJECTION, SOLUTION INTRAMUSCULAR; INTRAVENOUS AS NEEDED
Status: DISCONTINUED | OUTPATIENT
Start: 2024-08-21 | End: 2024-08-21

## 2024-08-21 RX ORDER — PROPOFOL 10 MG/ML
INJECTION, EMULSION INTRAVENOUS AS NEEDED
Status: DISCONTINUED | OUTPATIENT
Start: 2024-08-21 | End: 2024-08-21

## 2024-08-21 RX ORDER — BUPIVACAINE HYDROCHLORIDE 5 MG/ML
INJECTION, SOLUTION PERINEURAL AS NEEDED
Status: DISCONTINUED | OUTPATIENT
Start: 2024-08-21 | End: 2024-08-21 | Stop reason: HOSPADM

## 2024-08-21 RX ORDER — ROCURONIUM BROMIDE 10 MG/ML
INJECTION, SOLUTION INTRAVENOUS AS NEEDED
Status: DISCONTINUED | OUTPATIENT
Start: 2024-08-21 | End: 2024-08-21

## 2024-08-21 RX ORDER — APREPITANT 40 MG/1
CAPSULE ORAL AS NEEDED
Status: DISCONTINUED | OUTPATIENT
Start: 2024-08-21 | End: 2024-08-21

## 2024-08-21 RX ORDER — HYDROMORPHONE HYDROCHLORIDE 1 MG/ML
0.2 INJECTION, SOLUTION INTRAMUSCULAR; INTRAVENOUS; SUBCUTANEOUS EVERY 5 MIN PRN
Status: DISCONTINUED | OUTPATIENT
Start: 2024-08-21 | End: 2024-08-21 | Stop reason: HOSPADM

## 2024-08-21 RX ORDER — OXYCODONE HYDROCHLORIDE 5 MG/1
10 TABLET ORAL EVERY 4 HOURS PRN
Status: DISCONTINUED | OUTPATIENT
Start: 2024-08-21 | End: 2024-08-21 | Stop reason: HOSPADM

## 2024-08-21 RX ORDER — NALOXONE HYDROCHLORIDE 0.4 MG/ML
0.2 INJECTION, SOLUTION INTRAMUSCULAR; INTRAVENOUS; SUBCUTANEOUS EVERY 5 MIN PRN
Status: DISCONTINUED | OUTPATIENT
Start: 2024-08-21 | End: 2024-08-22 | Stop reason: HOSPADM

## 2024-08-21 RX ORDER — PANTOPRAZOLE SODIUM 40 MG/1
40 TABLET, DELAYED RELEASE ORAL
Status: DISCONTINUED | OUTPATIENT
Start: 2024-08-22 | End: 2024-08-22 | Stop reason: HOSPADM

## 2024-08-21 RX ORDER — LIDOCAINE HYDROCHLORIDE 40 MG/ML
SOLUTION TOPICAL AS NEEDED
Status: DISCONTINUED | OUTPATIENT
Start: 2024-08-21 | End: 2024-08-21

## 2024-08-21 RX ORDER — HYDROMORPHONE HYDROCHLORIDE 1 MG/ML
0.5 INJECTION, SOLUTION INTRAMUSCULAR; INTRAVENOUS; SUBCUTANEOUS EVERY 5 MIN PRN
Status: DISCONTINUED | OUTPATIENT
Start: 2024-08-21 | End: 2024-08-21 | Stop reason: HOSPADM

## 2024-08-21 RX ORDER — ACETAMINOPHEN 10 MG/ML
INJECTION, SOLUTION INTRAVENOUS AS NEEDED
Status: DISCONTINUED | OUTPATIENT
Start: 2024-08-21 | End: 2024-08-21

## 2024-08-21 RX ORDER — TRAMADOL HYDROCHLORIDE 50 MG/1
50 TABLET ORAL EVERY 6 HOURS PRN
Status: DISCONTINUED | OUTPATIENT
Start: 2024-08-21 | End: 2024-08-22 | Stop reason: HOSPADM

## 2024-08-21 RX ORDER — ONDANSETRON HYDROCHLORIDE 2 MG/ML
4 INJECTION, SOLUTION INTRAVENOUS EVERY 6 HOURS PRN
Status: DISCONTINUED | OUTPATIENT
Start: 2024-08-21 | End: 2024-08-22 | Stop reason: HOSPADM

## 2024-08-21 RX ORDER — POLYETHYLENE GLYCOL 3350 17 G/17G
17 POWDER, FOR SOLUTION ORAL DAILY
Status: DISCONTINUED | OUTPATIENT
Start: 2024-08-22 | End: 2024-08-22 | Stop reason: HOSPADM

## 2024-08-21 RX ORDER — OXYCODONE HYDROCHLORIDE 5 MG/1
5 TABLET ORAL EVERY 4 HOURS PRN
Status: DISCONTINUED | OUTPATIENT
Start: 2024-08-21 | End: 2024-08-22 | Stop reason: HOSPADM

## 2024-08-21 RX ORDER — LABETALOL HYDROCHLORIDE 5 MG/ML
5 INJECTION, SOLUTION INTRAVENOUS ONCE AS NEEDED
Status: DISCONTINUED | OUTPATIENT
Start: 2024-08-21 | End: 2024-08-21 | Stop reason: HOSPADM

## 2024-08-21 RX ORDER — UBIDECARENONE 75 MG
500 CAPSULE ORAL DAILY
COMMUNITY

## 2024-08-21 RX ORDER — SODIUM CHLORIDE, SODIUM LACTATE, POTASSIUM CHLORIDE, CALCIUM CHLORIDE 600; 310; 30; 20 MG/100ML; MG/100ML; MG/100ML; MG/100ML
INJECTION, SOLUTION INTRAVENOUS CONTINUOUS PRN
Status: DISCONTINUED | OUTPATIENT
Start: 2024-08-21 | End: 2024-08-21

## 2024-08-21 RX ADMIN — OXYCODONE HYDROCHLORIDE 5 MG: 5 TABLET ORAL at 23:21

## 2024-08-21 RX ADMIN — HYDROMORPHONE HYDROCHLORIDE 0.5 MG: 1 INJECTION, SOLUTION INTRAMUSCULAR; INTRAVENOUS; SUBCUTANEOUS at 17:58

## 2024-08-21 RX ADMIN — HYDROMORPHONE HYDROCHLORIDE 0.5 MG: 1 INJECTION, SOLUTION INTRAMUSCULAR; INTRAVENOUS; SUBCUTANEOUS at 17:29

## 2024-08-21 RX ADMIN — HYDROMORPHONE HYDROCHLORIDE 0.2 MG: 1 INJECTION, SOLUTION INTRAMUSCULAR; INTRAVENOUS; SUBCUTANEOUS at 17:24

## 2024-08-21 RX ADMIN — HYDROMORPHONE HYDROCHLORIDE 0.2 MG: 1 INJECTION, SOLUTION INTRAMUSCULAR; INTRAVENOUS; SUBCUTANEOUS at 17:13

## 2024-08-21 RX ADMIN — ACETAMINOPHEN 650 MG: 325 TABLET ORAL at 21:23

## 2024-08-21 RX ADMIN — Medication 2 L/MIN: at 16:55

## 2024-08-21 SDOH — SOCIAL STABILITY: SOCIAL INSECURITY: HAVE YOU HAD ANY THOUGHTS OF HARMING ANYONE ELSE?: NO

## 2024-08-21 SDOH — SOCIAL STABILITY: SOCIAL INSECURITY: DO YOU FEEL UNSAFE GOING BACK TO THE PLACE WHERE YOU ARE LIVING?: NO

## 2024-08-21 SDOH — SOCIAL STABILITY: SOCIAL INSECURITY: DO YOU FEEL ANYONE HAS EXPLOITED OR TAKEN ADVANTAGE OF YOU FINANCIALLY OR OF YOUR PERSONAL PROPERTY?: NO

## 2024-08-21 SDOH — SOCIAL STABILITY: SOCIAL INSECURITY: ARE THERE ANY APPARENT SIGNS OF INJURIES/BEHAVIORS THAT COULD BE RELATED TO ABUSE/NEGLECT?: NO

## 2024-08-21 SDOH — SOCIAL STABILITY: SOCIAL INSECURITY: DOES ANYONE TRY TO KEEP YOU FROM HAVING/CONTACTING OTHER FRIENDS OR DOING THINGS OUTSIDE YOUR HOME?: NO

## 2024-08-21 SDOH — SOCIAL STABILITY: SOCIAL INSECURITY: HAVE YOU HAD THOUGHTS OF HARMING ANYONE ELSE?: NO

## 2024-08-21 SDOH — HEALTH STABILITY: MENTAL HEALTH: CURRENT SMOKER: 0

## 2024-08-21 SDOH — SOCIAL STABILITY: SOCIAL INSECURITY: ABUSE: ADULT

## 2024-08-21 SDOH — SOCIAL STABILITY: SOCIAL INSECURITY: ARE YOU OR HAVE YOU BEEN THREATENED OR ABUSED PHYSICALLY, EMOTIONALLY, OR SEXUALLY BY ANYONE?: NO

## 2024-08-21 SDOH — SOCIAL STABILITY: SOCIAL INSECURITY: WERE YOU ABLE TO COMPLETE ALL THE BEHAVIORAL HEALTH SCREENINGS?: YES

## 2024-08-21 SDOH — SOCIAL STABILITY: SOCIAL INSECURITY: HAS ANYONE EVER THREATENED TO HURT YOUR FAMILY OR YOUR PETS?: NO

## 2024-08-21 ASSESSMENT — PAIN SCALES - GENERAL
PAINLEVEL_OUTOF10: 2
PAINLEVEL_OUTOF10: 5 - MODERATE PAIN
PAINLEVEL_OUTOF10: 6
PAINLEVEL_OUTOF10: 3
PAINLEVEL_OUTOF10: 2
PAIN_LEVEL: 0
PAINLEVEL_OUTOF10: 2
PAINLEVEL_OUTOF10: 7
PAINLEVEL_OUTOF10: 5 - MODERATE PAIN
PAINLEVEL_OUTOF10: 0 - NO PAIN
PAINLEVEL_OUTOF10: 7
PAINLEVEL_OUTOF10: 7
PAINLEVEL_OUTOF10: 2
PAINLEVEL_OUTOF10: 2
PAINLEVEL_OUTOF10: 0 - NO PAIN
PAINLEVEL_OUTOF10: 5 - MODERATE PAIN
PAINLEVEL_OUTOF10: 5 - MODERATE PAIN
PAINLEVEL_OUTOF10: 2
PAINLEVEL_OUTOF10: 2

## 2024-08-21 ASSESSMENT — COGNITIVE AND FUNCTIONAL STATUS - GENERAL
WALKING IN HOSPITAL ROOM: A LITTLE
MOVING TO AND FROM BED TO CHAIR: A LITTLE
MOBILITY SCORE: 18
TURNING FROM BACK TO SIDE WHILE IN FLAT BAD: A LITTLE
DAILY ACTIVITIY SCORE: 23
PATIENT BASELINE BEDBOUND: NO
CLIMB 3 TO 5 STEPS WITH RAILING: A LITTLE
STANDING UP FROM CHAIR USING ARMS: A LITTLE
MOVING FROM LYING ON BACK TO SITTING ON SIDE OF FLAT BED WITH BEDRAILS: A LITTLE
TOILETING: A LITTLE

## 2024-08-21 ASSESSMENT — PATIENT HEALTH QUESTIONNAIRE - PHQ9
1. LITTLE INTEREST OR PLEASURE IN DOING THINGS: NOT AT ALL
2. FEELING DOWN, DEPRESSED OR HOPELESS: NOT AT ALL
SUM OF ALL RESPONSES TO PHQ9 QUESTIONS 1 & 2: 0

## 2024-08-21 ASSESSMENT — ACTIVITIES OF DAILY LIVING (ADL)
TOILETING: NEEDS ASSISTANCE
ASSISTIVE_DEVICE: NONE;CANE
LACK_OF_TRANSPORTATION: NO
DRESSING YOURSELF: INDEPENDENT
HEARING - RIGHT EAR: FUNCTIONAL
JUDGMENT_ADEQUATE_SAFELY_COMPLETE_DAILY_ACTIVITIES: YES
FEEDING YOURSELF: INDEPENDENT
BATHING: INDEPENDENT
WALKS IN HOME: INDEPENDENT
GROOMING: INDEPENDENT
PATIENT'S MEMORY ADEQUATE TO SAFELY COMPLETE DAILY ACTIVITIES?: YES
HEARING - LEFT EAR: FUNCTIONAL
ADEQUATE_TO_COMPLETE_ADL: YES

## 2024-08-21 ASSESSMENT — PAIN - FUNCTIONAL ASSESSMENT

## 2024-08-21 ASSESSMENT — LIFESTYLE VARIABLES
AUDIT-C TOTAL SCORE: 0
AUDIT-C TOTAL SCORE: 0
HOW OFTEN DO YOU HAVE 6 OR MORE DRINKS ON ONE OCCASION: NEVER
HOW MANY STANDARD DRINKS CONTAINING ALCOHOL DO YOU HAVE ON A TYPICAL DAY: PATIENT DOES NOT DRINK
SKIP TO QUESTIONS 9-10: 1
HOW OFTEN DO YOU HAVE A DRINK CONTAINING ALCOHOL: NEVER

## 2024-08-21 ASSESSMENT — COLUMBIA-SUICIDE SEVERITY RATING SCALE - C-SSRS
1. IN THE PAST MONTH, HAVE YOU WISHED YOU WERE DEAD OR WISHED YOU COULD GO TO SLEEP AND NOT WAKE UP?: NO
2. HAVE YOU ACTUALLY HAD ANY THOUGHTS OF KILLING YOURSELF?: NO
6. HAVE YOU EVER DONE ANYTHING, STARTED TO DO ANYTHING, OR PREPARED TO DO ANYTHING TO END YOUR LIFE?: NO

## 2024-08-21 NOTE — ANESTHESIA POSTPROCEDURE EVALUATION
Patient: Alana Ashby    Procedure Summary       Date: 08/21/24 Room / Location: [facility] OR 22 / Virtual [facility] [place]    Anesthesia Start: 1341 Anesthesia Stop: 1703    Procedure: Thyroidectomy (Bilateral: Neck) Diagnosis:       Thyroid cancer (Multi)      (Thyroid cancer (Multi) [C73])    Surgeons: Sundeep Taylor MD Responsible Provider: Ronald Bernardo MD    Anesthesia Type: general ASA Status: 3            Anesthesia Type: general    Vitals Value Taken Time   /66 08/21/24 1705   Temp 36.2 08/21/24 1705   Pulse 88 08/21/24 1658   Resp 12 08/21/24 1658   SpO2 93 % 08/21/24 1658   Vitals shown include unfiled device data.    Anesthesia Post Evaluation    Patient location during evaluation: PACU  Patient participation: complete - patient participated  Level of consciousness: awake and sleepy but conscious  Pain score: 0  Pain management: adequate  Multimodal analgesia pain management approach  Airway patency: patent  Cardiovascular status: acceptable, blood pressure returned to baseline and hemodynamically stable  Respiratory status: acceptable, airway suctioned and face mask  Hydration status: acceptable  Postoperative Nausea and Vomiting: none        No notable events documented.

## 2024-08-21 NOTE — ANESTHESIA PREPROCEDURE EVALUATION
Patient: Alana Ashby    Procedure Information       Anesthesia Start Date/Time: 08/21/24 1341    Procedure: Thyroidectomy (Bilateral)    Location: [facility] OR 22 / Virtual [facility] OR    Surgeons: Sundeep Taylor MD            Relevant Problems   Cardiac   (+) HTN (hypertension)   (+) Hyperlipidemia      Neuro   (+) Anxiety   (+) Cervical radiculitis      GI   (+) GERD (gastroesophageal reflux disease)   (+) Hiatal hernia   (+) Pharyngoesophageal dysphagia      Endocrine   (+) Multiple thyroid nodules   (+) Thyroid cancer (Multi)       Clinical information reviewed:    Allergies                NPO Detail:  NPO/Void Status  Carbohydrate Drink Given Prior to Surgery? : N  Date of Last Liquid: 08/20/24  Time of Last Liquid: 2330  Date of Last Solid: 08/20/24  Time of Last Solid: 2030  Last Intake Type: Clear fluids  Time of Last Void: 1135         Physical Exam    Airway  Mallampati: I  TM distance: >3 FB  Neck ROM: limited     Cardiovascular - normal exam     Dental        Pulmonary - normal exam     Abdominal            Anesthesia Plan    History of general anesthesia?: yes  History of complications of general anesthesia?: no    ASA 3     general     The patient is not a current smoker.  Patient did not smoke on day of procedure.    intravenous induction   Postoperative administration of opioids is intended.  Trial extubation is planned.  Anesthetic plan and risks discussed with patient.  Use of blood products discussed with patient who consented to blood products.    Plan discussed with attending.

## 2024-08-21 NOTE — ANESTHESIA PROCEDURE NOTES
Airway  Date/Time: 8/21/2024 1:57 PM  Urgency: elective    Airway not difficult    Staffing  Performed: attending   Authorized by: Ronald Bernardo MD    Performed by: Aida Camacho MD  Patient location during procedure: OR    Indications and Patient Condition  Indications for airway management: anesthesia and airway protection  Spontaneous ventilation: present  Sedation level: deep  Preoxygenated: yes  Patient position: sniffing  Mask difficulty assessment: 1 - vent by mask  Planned trial extubation    Final Airway Details  Final airway type: endotracheal airway      Successful airway: ETT  Cuffed: yes   Successful intubation technique: video laryngoscopy  Facilitating devices/methods: intubating stylet and cricoid pressure  Endotracheal tube insertion site: oral  Blade: Dodie  Blade size: #3  ETT size (mm): 7.0  Cormack-Lehane Classification: grade I - full view of glottis  Placement verified by: capnometry   Measured from: lips  ETT to lips (cm): 22  Number of attempts at approach: 2  Ventilation between attempts: BVM

## 2024-08-21 NOTE — ANESTHESIA PROCEDURE NOTES
Peripheral IV  Date/Time: 8/21/2024 2:46 PM      Placement  Needle size: 16 G  Laterality: left  Location: hand  Local anesthetic: none  Site prep: alcohol  Technique: anatomical landmarks  Attempts: 1

## 2024-08-21 NOTE — OP NOTE
Thyroidectomy (B) Operative Note     Date: 2024  OR Location: [facility] OR    Name: Alana Ashby, : 1955, Age: 68 y.o., MRN: [id number], Sex: female    Diagnosis  Pre-op Diagnosis      * Thyroid cancer (Multi) [C73] Post-op Diagnosis     * Thyroid cancer (Multi) [C73]     Procedures  Thyroidectomy  40488 - DC THYROIDECTOMY TOTAL/COMPLETE      Surgeons      * Sundeep Taylor - Primary    Resident/Fellow/Other Assistant:  Surgeons and Role:  * No surgeons found with a matching role *    Procedure Summary  Anesthesia: General  ASA: III  Anesthesia Staff: Anesthesiologist: Ronald Bernardo MD  CRNA: BETTY Courtney-DARRIN  Anesthesia Resident: Aida Camacho MD  Estimated Blood Loss: 10 mL  Intra-op Medications:   Administrations occurring from 1200 to 1630 on 24:   Medication Name Total Dose   sodium chloride 0.9 % irrigation solution 1,000 mL   BUPivacaine HCl (Marcaine) 0.5 % (5 mg/mL) injection 10 mL              Anesthesia Record               Intraprocedure I/O Totals          Intake    Dexmedetomidine 0.00 mL    The total shown is the total volume documented since Anesthesia Start was filed.    LR bolus 700.00 mL    LR infusion 200.00 mL    Total Intake 900 mL          Specimen:   ID Type Source Tests Collected by Time   1 : THYROID WITH MARKINGS Tissue THYROID TOTAL THYROIDECTOMY SURGICAL PATHOLOGY EXAM Sundeep Taylor MD 2024 1601        Staff:   Circulator: Davida  Circulator: John Blakely Person: Junior Pena Circulator: Jenny Pena Scrub: Anibal         Drains and/or Catheters: * None in log *    Tourniquet Times:         Implants:     Findings: Patient with a multinodular thyroid gland with well-contained papillary thyroid cancer.  No evidence of any lymphadenopathy in the central neck compartment.    Indications: Alana Ashby is an 68 y.o. female who is having surgery for Thyroid cancer (Multi) [C73].  Patient had incidental discovery of nodular thyroid disease on  CT scan for follow-up of pulmonary nodules.  Ultrasound revealed multinodular thyroid gland with a dominant left-sided thyroid nodule.  I performed an ultrasound-guided biopsy of this in clinic which was positive for papillary thyroid cancer.  Therefore I recommended a total thyroidectomy for her.  Risk benefits of surgery were discussed she agreed and signed consent.    The patient was seen in the preoperative area. The risks, benefits, complications, treatment options, non-operative alternatives, expected recovery and outcomes were discussed with the patient. The possibilities of reaction to medication, pulmonary aspiration, injury to surrounding structures, bleeding, recurrent infection, the need for additional procedures, failure to diagnose a condition, and creating a complication requiring transfusion or operation were discussed with the patient. The patient concurred with the proposed plan, giving informed consent.  The site of surgery was properly noted/marked if necessary per policy. The patient has been actively warmed in preoperative area. Preoperative antibiotics are not indicated. Venous thrombosis prophylaxis have been ordered including bilateral sequential compression devices    Procedure Details: On the operative date patient's brought to the operating room after duction anesthetic she is prepped and draped in the usual sterile fashion with a towel roll behind the shoulders and the neck gently extended.  She had SCDs on for DVT prophylaxis.  We made a 7 cm cervical collar incision.  Divided down through platysma with Bovie electrocautery.  We raised subplatysmal flap superior to the notch and thyroid cartilage inferior to the sternal notch laterally over the sternocleidomastoid muscles.  We then  the strap muscles in the midline.  Primary tumor was in the left lobe.  Started the left side retracted strap muscles out laterally to level the carotid artery.  Middle thyroid vein was identified  ligated divided with 3-0 silk ties.  Next identified cricothyroid space went medial to lateral around the superior pole vessels.  2 and 3-0 silk ties were used approximately LigaSure toward the thyroid specimen side.  We then went down over the trach and began taking branches the inferior thyroid vein.  At the 5 o'clock position was a normal-appearing left inferior parathyroid gland peeled away intact and well-vascularized pedicle.  This began dissecting up laterally patient had a posterolateral extension of thyroid tissue representing a tubercle of Zuckerkandl.  We dissected out along the lateral border of that and began to roll the tubercle anteriorly.  As we did so sitting directly behind it was the left recurrent laryngeal nerve.  We stayed anteromedial to the nerve with all 3-0 silk ties the LigaSure was not used in this area.  As we followed the nerve up at the 2 o'clock position was a normal-appearing left superior parathyroid gland peeled away intact and well-vascularized pedicle.  We followed the last the nerve into the cricothyroid musculature and then rolled the thyroid up onto the trachea.    We divided behind the isthmus with Bovie electrocautery.  There was a small area of pyramidal lobe coming up in the midline.  We dissected on either side of it and then transected with the LigaSure once it thinned out.  There were no abnormal central neck lymph nodes noted.    Went to the right side and retracted strap muscles out laterally to level the carotid artery.  Again we divided the middle thyroid vein with 3-0 silk ties.  We again notified cricothyroid space and went medial to lateral on the upper pole vessels.  At the 11 o'clock position was a normal-appearing right superior parathyroid gland peeled away intact and well-vascularized pedicle.  We then went down of the trachea and took branches the inferior thyroid vein.  At the 7 o'clock position the thyroid capsule was a normal-appearing right inferior  parathyroid gland peeled away intact and well-vascularized pedicle.  We dissected out laterally identified the inferior thyroid artery.  Crossing underneath it in a standard location was the right recurrent laryngeal nerve.  We stayed anteromedial to the nerve with all 3-0 silk ties.  As we followed the nerve up, the nerve branched about 6 mm proximal to cricothyroid musculature.  Both branches were identified and preserved.  Ultimately rolled the last of the thyroid gland up onto the trachea and removed the specimen.    Specimen was marked as thyroid with a double tailed suture on the right superior pole single to suture on the left superior pole and sent to pathology.  We irrigated the neck out well and achieved hemostasis.  Both recurrent nerves and the branches were intact.  Parathyroids looked well-vascularized.  Overall hemostasis was good.  We did 1 final inspection and no abnormal central neck lymph nodes were noted.  We used Ellen powder for final hemostasis and then closed traps and platysma with 3-0 and 4-0 sutures.  Dry sterile dressings were applied.  Complications:  None; patient tolerated the procedure well.    Disposition: PACU - hemodynamically stable.  Condition: stable         Additional Details:     Attending Attestation:     Sundeep Taylor  Phone Number: [phone]

## 2024-08-22 ENCOUNTER — PHARMACY VISIT (OUTPATIENT)
Dept: PHARMACY | Facility: CLINIC | Age: 69
End: 2024-08-22
Payer: COMMERCIAL

## 2024-08-22 VITALS
SYSTOLIC BLOOD PRESSURE: 116 MMHG | WEIGHT: 213.41 LBS | HEIGHT: 69 IN | BODY MASS INDEX: 31.61 KG/M2 | HEART RATE: 78 BPM | RESPIRATION RATE: 18 BRPM | TEMPERATURE: 96.8 F | DIASTOLIC BLOOD PRESSURE: 75 MMHG | OXYGEN SATURATION: 96 %

## 2024-08-22 LAB
CALCIUM SERPL-MCNC: 8.6 MG/DL (ref 8.6–10.6)
CALCIUM SERPL-MCNC: 8.7 MG/DL (ref 8.6–10.6)

## 2024-08-22 PROCEDURE — 2500000001 HC RX 250 WO HCPCS SELF ADMINISTERED DRUGS (ALT 637 FOR MEDICARE OP): Performed by: STUDENT IN AN ORGANIZED HEALTH CARE EDUCATION/TRAINING PROGRAM

## 2024-08-22 PROCEDURE — RXMED WILLOW AMBULATORY MEDICATION CHARGE

## 2024-08-22 PROCEDURE — 99024 POSTOP FOLLOW-UP VISIT: CPT | Performed by: NURSE PRACTITIONER

## 2024-08-22 PROCEDURE — 7100000011 HC EXTENDED STAY RECOVERY HOURLY - NURSING UNIT

## 2024-08-22 PROCEDURE — 36415 COLL VENOUS BLD VENIPUNCTURE: CPT | Performed by: STUDENT IN AN ORGANIZED HEALTH CARE EDUCATION/TRAINING PROGRAM

## 2024-08-22 PROCEDURE — 82310 ASSAY OF CALCIUM: CPT | Performed by: STUDENT IN AN ORGANIZED HEALTH CARE EDUCATION/TRAINING PROGRAM

## 2024-08-22 PROCEDURE — 2500000004 HC RX 250 GENERAL PHARMACY W/ HCPCS (ALT 636 FOR OP/ED): Performed by: STUDENT IN AN ORGANIZED HEALTH CARE EDUCATION/TRAINING PROGRAM

## 2024-08-22 RX ORDER — ONDANSETRON 4 MG/1
4 TABLET, ORALLY DISINTEGRATING ORAL EVERY 8 HOURS PRN
Qty: 3 TABLET | Refills: 0 | Status: SHIPPED | OUTPATIENT
Start: 2024-08-22 | End: 2024-08-22

## 2024-08-22 RX ORDER — LEVOTHYROXINE SODIUM 125 UG/1
125 TABLET ORAL DAILY
Qty: 30 TABLET | Refills: 11 | Status: SHIPPED | OUTPATIENT
Start: 2024-08-23 | End: 2024-08-22

## 2024-08-22 RX ORDER — ONDANSETRON 4 MG/1
4 TABLET, ORALLY DISINTEGRATING ORAL EVERY 8 HOURS PRN
Qty: 3 TABLET | Refills: 0 | Status: SHIPPED | OUTPATIENT
Start: 2024-08-22

## 2024-08-22 RX ORDER — LEVOTHYROXINE SODIUM 125 UG/1
125 TABLET ORAL DAILY
Qty: 30 TABLET | Refills: 0 | Status: SHIPPED | OUTPATIENT
Start: 2024-08-23 | End: 2024-08-26 | Stop reason: SDUPTHER

## 2024-08-22 RX ORDER — TRAMADOL HYDROCHLORIDE 50 MG/1
50 TABLET ORAL EVERY 6 HOURS PRN
Qty: 28 TABLET | Refills: 0 | Status: SHIPPED | OUTPATIENT
Start: 2024-08-22

## 2024-08-22 RX ORDER — TRAMADOL HYDROCHLORIDE 50 MG/1
50 TABLET ORAL EVERY 6 HOURS PRN
Qty: 28 TABLET | Refills: 0 | Status: SHIPPED | OUTPATIENT
Start: 2024-08-22 | End: 2024-08-22

## 2024-08-22 RX ADMIN — OXYCODONE HYDROCHLORIDE 5 MG: 5 TABLET ORAL at 05:20

## 2024-08-22 RX ADMIN — ONDANSETRON 4 MG: 2 INJECTION INTRAMUSCULAR; INTRAVENOUS at 09:56

## 2024-08-22 RX ADMIN — OXYCODONE HYDROCHLORIDE 5 MG: 5 TABLET ORAL at 09:56

## 2024-08-22 RX ADMIN — PANTOPRAZOLE SODIUM 40 MG: 40 TABLET, DELAYED RELEASE ORAL at 10:05

## 2024-08-22 RX ADMIN — ACETAMINOPHEN 650 MG: 325 TABLET ORAL at 02:24

## 2024-08-22 RX ADMIN — LEVOTHYROXINE SODIUM 125 MCG: 0.12 TABLET ORAL at 05:20

## 2024-08-22 RX ADMIN — ACETAMINOPHEN 650 MG: 325 TABLET ORAL at 09:56

## 2024-08-22 ASSESSMENT — COGNITIVE AND FUNCTIONAL STATUS - GENERAL
MOVING TO AND FROM BED TO CHAIR: A LITTLE
DAILY ACTIVITIY SCORE: 24
MOVING FROM LYING ON BACK TO SITTING ON SIDE OF FLAT BED WITH BEDRAILS: A LITTLE
WALKING IN HOSPITAL ROOM: A LITTLE
STANDING UP FROM CHAIR USING ARMS: A LITTLE
TURNING FROM BACK TO SIDE WHILE IN FLAT BAD: A LITTLE
MOBILITY SCORE: 18
CLIMB 3 TO 5 STEPS WITH RAILING: A LITTLE

## 2024-08-22 ASSESSMENT — PAIN - FUNCTIONAL ASSESSMENT
PAIN_FUNCTIONAL_ASSESSMENT: 0-10

## 2024-08-22 ASSESSMENT — PAIN DESCRIPTION - LOCATION: LOCATION: NECK

## 2024-08-22 ASSESSMENT — PAIN SCALES - GENERAL
PAINLEVEL_OUTOF10: 3
PAINLEVEL_OUTOF10: 3
PAINLEVEL_OUTOF10: 6
PAINLEVEL_OUTOF10: 6
PAINLEVEL_OUTOF10: 4
PAINLEVEL_OUTOF10: 7

## 2024-08-22 ASSESSMENT — PAIN DESCRIPTION - ORIENTATION: ORIENTATION: MID

## 2024-08-22 NOTE — CARE PLAN
The patient's goals for the shift include  Pt remaining safe n free from injury    The clinical goals for the shift include Pain to be managed      Problem: Pain - Adult  Goal: Verbalizes/displays adequate comfort level or baseline comfort level  Outcome: Progressing     Problem: Safety - Adult  Goal: Free from fall injury  Outcome: Progressing     Problem: Discharge Planning  Goal: Discharge to home or other facility with appropriate resources  Outcome: Progressing     Problem: Chronic Conditions and Co-morbidities  Goal: Patient's chronic conditions and co-morbidity symptoms are monitored and maintained or improved  Outcome: Progressing

## 2024-08-22 NOTE — SIGNIFICANT EVENT
S:    POD 0 from total thyroidectomy with Dr. Taylor. She is doing well overall. She endorses some pain at her surgical site, but denies any trouble swallowing liquids or breathing.   She also denies any weakness, lightheadedness, nausea, or vomiting.     O:   Vital signs are stable, normotensive, afebrile, no new or worsening oxygen requirement, not tachycardic  Visit Vitals  /76   Pulse 91   Temp 36.1 °C (97 °F) (Temporal)   Resp 12        Constitutional: no acute distress  Skin: warm and dry overall   Neuro: A/O x4, no gross deficits   HEENT: Atraumatic, no scleral icterus  Cardiac: RRR  Pulmonary: Unlabored respirations   Abdomen: Non distended, non tender  GI: Voiding  Surgical Site: Dressing clean dry and intact with minimal amounts of strikethrough, appropriately tender. No swelling or hematomas appreciated.     A/P:  Overall, patient is doing well postoperatively with no acute concerns.  Will continue to optimize pain control as needed.  Will continue to monitor clinical exam, vitals, I&O's, and labs when available.  Will follow up on the patient in the a.m. or sooner as needed.    Jovan Page MD   PGY-1, General Surgery

## 2024-08-22 NOTE — DISCHARGE SUMMARY
"Discharge Diagnosis  Thyroid cancer (Multi)    Test Results Pending At Discharge  Pending Labs       Order Current Status    Surgical Pathology Exam In process            Hospital Course   68 yr old female with thyroid cancer who underwent total thyroidectomy on 8/21 with Dr Taylor.  Post-op course uncomplicated.  Post-op Ca levels >8.5.  DC home POD 1.    Pertinent Physical Exam At Time of Discharge  Neurological: Awake, alert, conversive  Head/Neck: incision with primary dressing in place - removed - well approx with steri strips; no erythema, drainage, swelling noted.  negative Chovsteks.  no perioral numbness/tingling.  Respiratory/Thorax: even, unlabored  Genitourinary: voiding  Skin: warm, dry  Musculoskeletal: WOLF  Extremities: no numbness/tingling   Psychological: appropriate mood/affect    /72 (BP Location: Right arm, Patient Position: Lying)   Pulse 73   Temp 36.7 °C (98.1 °F) (Temporal)   Resp 17   Ht 1.753 m (5' 9\")   Wt 96.8 kg (213 lb 6.5 oz)   SpO2 96%   BMI 31.51 kg/m²      Home Medications     Medication List      START taking these medications     levothyroxine 125 mcg tablet; Commonly known as: Synthroid, Levoxyl;   Take 1 tablet (125 mcg) by mouth early in the morning.. Take on an empty   stomach at the same time each day, either 30 to 60 minutes prior to   breakfast; Start taking on: August 23, 2024   traMADol 50 mg tablet; Commonly known as: Ultram; Take 1 tablet (50 mg)   by mouth every 6 hours if needed for severe pain (7 - 10) for up to 7   days.     CONTINUE taking these medications     atorvastatin 20 mg tablet; Commonly known as: Lipitor; Take 1 tablet (20   mg) by mouth once daily. as directed   b complex 0.4 mg tablet   cyanocobalamin 500 mcg tablet; Commonly known as: Vitamin B-12   ibuprofen 200 mg tablet   lisinopril 20 mg tablet; Take 1 tablet (20 mg) by mouth once daily. as   directed   omeprazole 40 mg DR capsule; Commonly known as: PriLOSEC; Take 1 capsule   (40 mg) " by mouth once daily in the morning. Take before meals.   Pain Reliever (acetaminophen) 500 mg tablet; Generic drug: acetaminophen       Outpatient Follow-Up  Future Appointments   Date Time Provider Department Center   9/9/2024 10:30 AM Sundeep Taylor MD [id number] [facility]   9/16/2024 10:15 AM Lj Delcid MD [id number] [facility]   10/11/2024 10:45 AM CON MAMMO 1 [facility]   11/18/2024  1:45 PM Eulalia Greene DO [id number] [facility]       Maria Elena Mae, APRN-CNP

## 2024-08-22 NOTE — PROGRESS NOTES
Pharmacy Medication History Review    Alana Ashby is a 68 y.o. female admitted for Thyroid cancer ([facility]). Pharmacy reviewed the patient's [id number] medications and allergies for accuracy.    The list below reflects the updated PTA list. Comments regarding how patient may be taking medications differently can be found in the Admit Orders Activity  Prior to Admission Medications   Prescriptions Last Dose Informant Patient Reported? Taking?   Pain Reliever, acetaminophen, 500 mg tablet Past Month Self Yes Yes   Sig: TAKE 1 TABLET BY MOUTH EVERY 4 TO 6 HOURS AS NEEDED   atorvastatin (Lipitor) 20 mg tablet 8/20/2024 at am Self No Yes   Sig: Take 1 tablet (20 mg) by mouth once daily. as directed   b complex 0.4 mg tablet 8/20/2024 at am Self Yes Yes   Sig: Take 1 tablet by mouth once daily.   cyanocobalamin (Vitamin B-12) 500 mcg tablet 8/20/2024 at am Self Yes Yes   Sig: Take 1 tablet (500 mcg) by mouth once daily.   ibuprofen 200 mg tablet Past Month Self Yes Yes   Sig: Take by mouth every 6 hours if needed for mild pain (1 - 3).   lisinopril 20 mg tablet 8/20/2024 at am Self No Yes   Sig: Take 1 tablet (20 mg) by mouth once daily. as directed   omeprazole (PriLOSEC) 40 mg DR capsule 8/20/2024 at am Self No Yes   Sig: Take 1 capsule (40 mg) by mouth once daily in the morning. Take before meals.      Facility-Administered Medications: None        The list below reflects the updated allergy list. Please review each documented allergy for additional clarification and justification.  Allergies  Reviewed by Cindy Garcia RN on 8/21/2024        Severity Reactions Comments    Baclofen Not Specified Unknown     Gabapentin Not Specified Other Memory loss            Patient declines M2B at discharge. Pharmacy has been updated to [facility] [place] in [place], [place].    Sources used to complete the med history include   Patient Interview - good historian, had updated medication list current as of 8/13/24 on hand  Admission  MedRec Grid  OARRS - none recent  EPIC medication dispense report    Medications ADDED:  Vitamin D3 5000 units  B-complex  Medications CHANGED:  None  Medications REMOVED:   None    Below are additional concerns with the patient's PTA list.  None    Marla Wheatley, PharmD   Transitions of Care Pharmacist  [facility]  Please reach out via Secure Chat for questions, or if no response call [phone] or "[employer] [facility]

## 2024-08-26 DIAGNOSIS — E03.9 HYPOTHYROIDISM, UNSPECIFIED TYPE: Primary | ICD-10-CM

## 2024-08-26 DIAGNOSIS — E78.49 OTHER HYPERLIPIDEMIA: ICD-10-CM

## 2024-08-26 DIAGNOSIS — K21.9 GASTROESOPHAGEAL REFLUX DISEASE WITHOUT ESOPHAGITIS: ICD-10-CM

## 2024-08-26 DIAGNOSIS — I10 PRIMARY HYPERTENSION: ICD-10-CM

## 2024-08-26 RX ORDER — OMEPRAZOLE 40 MG/1
40 CAPSULE, DELAYED RELEASE ORAL
Qty: 90 CAPSULE | Refills: 3 | Status: SHIPPED | OUTPATIENT
Start: 2024-08-26 | End: 2025-08-26

## 2024-08-26 RX ORDER — LEVOTHYROXINE SODIUM 125 UG/1
125 TABLET ORAL DAILY
Qty: 90 TABLET | Refills: 3 | Status: SHIPPED | OUTPATIENT
Start: 2024-08-26 | End: 2025-08-26

## 2024-08-26 RX ORDER — LISINOPRIL 20 MG/1
20 TABLET ORAL DAILY
Qty: 90 TABLET | Refills: 3 | Status: SHIPPED | OUTPATIENT
Start: 2024-08-26 | End: 2025-08-26

## 2024-08-26 RX ORDER — ATORVASTATIN CALCIUM 20 MG/1
20 TABLET, FILM COATED ORAL DAILY
Qty: 90 TABLET | Refills: 3 | Status: SHIPPED | OUTPATIENT
Start: 2024-08-26 | End: 2025-08-26

## 2024-09-09 ENCOUNTER — APPOINTMENT (OUTPATIENT)
Dept: SURGERY | Facility: CLINIC | Age: 69
End: 2024-09-09
Payer: MEDICARE

## 2024-09-09 VITALS
WEIGHT: 214 LBS | BODY MASS INDEX: 31.6 KG/M2 | DIASTOLIC BLOOD PRESSURE: 81 MMHG | SYSTOLIC BLOOD PRESSURE: 114 MMHG | HEART RATE: 96 BPM

## 2024-09-09 DIAGNOSIS — C73 THYROID CANCER (MULTI): Primary | ICD-10-CM

## 2024-09-09 PROCEDURE — 1160F RVW MEDS BY RX/DR IN RCRD: CPT | Performed by: SURGERY

## 2024-09-09 PROCEDURE — 3079F DIAST BP 80-89 MM HG: CPT | Performed by: SURGERY

## 2024-09-09 PROCEDURE — 1036F TOBACCO NON-USER: CPT | Performed by: SURGERY

## 2024-09-09 PROCEDURE — 99024 POSTOP FOLLOW-UP VISIT: CPT | Performed by: SURGERY

## 2024-09-09 PROCEDURE — 3074F SYST BP LT 130 MM HG: CPT | Performed by: SURGERY

## 2024-09-09 PROCEDURE — 1159F MED LIST DOCD IN RCRD: CPT | Performed by: SURGERY

## 2024-09-09 NOTE — PROGRESS NOTES
Subjective   Patient ID: Alana Ashby is a 68 y.o. female who presents for a visit after total thyroidectomy.    HPI I saw Mrs. Ashby back in surgery clinic today.  She is now just about 3 weeks status post total thyroidectomy for papillary thyroid cancer.  Tumor was well-contained at the time of her operation and no abnormal cervical lymph nodes were noted.  She did well with the surgery with no immediate postop issues.  Her calcium at time of discharge was normal at 8.6 in which she was asymptomatic with no signs of hypoparathyroidism.    We discharged her home on levothyroxine 125 mcg daily.  She said her energy levels are good.  No signs or symptoms of hypothyroidism.    Her final pathology is still pending.  With her endocrinologist, Dr. Mitchell next week and I told her she could follow-up with him on the results as well.    Since surgery she is doing very well at home.  No neck pain.  She only took Tylenol for pain after surgery.  No narcotics.  No troubles with breathing or swallowing.  Her voice is normal.  No hoarseness.    Objective   Physical Exam  Vitals reviewed.   Constitutional:       Appearance: Normal appearance.      Comments: Her voice is normal   Neck:      Comments: Neck incision well-healed no seroma.  Thyroid surgically absent.  Trachea midline.  Neurological:      Mental Status: She is alert.         Assessment/Plan    Mrs. Ashby did extremely well with total thyroidectomy for her papillary thyroid cancer.  She has had no postop complications or issues.  She is on levothyroxine at 125 mcg daily.  She is scheduled to follow-up with her endocrinologist next week.  He can make any adjustments on her thyroid hormone replacement.    Her pathology is still pending.  I informed her that I will be out of town for the next couple of weeks.  She is meeting with her endocrinologist next week and I told her she could asked Dr. Mitchell to check on her pathology report as well.    She should follow-up with me  on an annual basis.         Sundeep Taylor MD 09/09/24 10:20 AM

## 2024-09-10 LAB
LAB AP ASR DISCLAIMER: NORMAL
LAB AP BLOCK FOR ADDITIONAL STUDIES: NORMAL
LABORATORY COMMENT REPORT: NORMAL
PATH REPORT.COMMENTS IMP SPEC: NORMAL
PATH REPORT.FINAL DX SPEC: NORMAL
PATH REPORT.GROSS SPEC: NORMAL
PATH REPORT.RELEVANT HX SPEC: NORMAL
PATH REPORT.TOTAL CANCER: NORMAL
PATHOLOGY SYNOPTIC REPORT: NORMAL

## 2024-09-10 PROCEDURE — 0760T DGTZ GLS MCRSCP SL IMM 1ST: CPT | Mod: TC,SUR | Performed by: SURGERY

## 2024-09-10 PROCEDURE — 88342 IMHCHEM/IMCYTCHM 1ST ANTB: CPT | Performed by: PATHOLOGY

## 2024-09-11 ENCOUNTER — TELEPHONE (OUTPATIENT)
Dept: SURGERY | Facility: CLINIC | Age: 69
End: 2024-09-11
Payer: MEDICARE

## 2024-09-11 NOTE — TELEPHONE ENCOUNTER
Call to patient to review surgical pathology results. Patient notified results show papillary thyroid cancer as expected. 2 lymph nodes negative. Patient has a follow up appointment with endocrinologist Dr. Mitchell 9/17/24. All questions addressed.

## 2024-09-13 LAB
ATRIAL RATE: 87 BPM
P AXIS: 59 DEGREES
P OFFSET: 197 MS
P ONSET: 147 MS
PR INTERVAL: 166 MS
Q ONSET: 230 MS
QRS COUNT: 14 BEATS
QRS DURATION: 70 MS
QT INTERVAL: 348 MS
QTC CALCULATION(BAZETT): 418 MS
QTC FREDERICIA: 393 MS
R AXIS: -21 DEGREES
T AXIS: 52 DEGREES
T OFFSET: 404 MS
VENTRICULAR RATE: 87 BPM

## 2024-09-16 ENCOUNTER — OFFICE VISIT (OUTPATIENT)
Dept: PAIN MEDICINE | Facility: CLINIC | Age: 69
End: 2024-09-16
Payer: MEDICARE

## 2024-09-16 VITALS
DIASTOLIC BLOOD PRESSURE: 75 MMHG | WEIGHT: 214 LBS | HEART RATE: 79 BPM | SYSTOLIC BLOOD PRESSURE: 108 MMHG | RESPIRATION RATE: 18 BRPM | BODY MASS INDEX: 31.7 KG/M2 | HEIGHT: 69 IN

## 2024-09-16 DIAGNOSIS — M54.2 NECK PAIN: ICD-10-CM

## 2024-09-16 DIAGNOSIS — Z01.818 PREOP EXAMINATION: Primary | ICD-10-CM

## 2024-09-16 DIAGNOSIS — M96.1 POST LAMINECTOMY SYNDROME: ICD-10-CM

## 2024-09-16 PROCEDURE — 3074F SYST BP LT 130 MM HG: CPT | Performed by: ANESTHESIOLOGY

## 2024-09-16 PROCEDURE — 1036F TOBACCO NON-USER: CPT | Performed by: ANESTHESIOLOGY

## 2024-09-16 PROCEDURE — 3078F DIAST BP <80 MM HG: CPT | Performed by: ANESTHESIOLOGY

## 2024-09-16 PROCEDURE — 99214 OFFICE O/P EST MOD 30 MIN: CPT | Performed by: ANESTHESIOLOGY

## 2024-09-16 PROCEDURE — 1159F MED LIST DOCD IN RCRD: CPT | Performed by: ANESTHESIOLOGY

## 2024-09-16 PROCEDURE — 3008F BODY MASS INDEX DOCD: CPT | Performed by: ANESTHESIOLOGY

## 2024-09-16 PROCEDURE — 1160F RVW MEDS BY RX/DR IN RCRD: CPT | Performed by: ANESTHESIOLOGY

## 2024-09-16 RX ORDER — CEFAZOLIN SODIUM 2 G/100ML
2 INJECTION, SOLUTION INTRAVENOUS ONCE
OUTPATIENT
Start: 2024-09-16 | End: 2024-09-16

## 2024-09-16 RX ORDER — ERGOCALCIFEROL 1.25 MG/1
2000 CAPSULE ORAL WEEKLY
COMMUNITY

## 2024-09-16 RX ORDER — CALCIUM CARBONATE 300MG(750)
400 TABLET,CHEWABLE ORAL DAILY
COMMUNITY

## 2024-09-16 RX ORDER — DOCUSATE SODIUM 100 MG/1
100 CAPSULE, LIQUID FILLED ORAL 3 TIMES DAILY
COMMUNITY

## 2024-09-16 RX ORDER — SODIUM CHLORIDE, SODIUM LACTATE, POTASSIUM CHLORIDE, CALCIUM CHLORIDE 600; 310; 30; 20 MG/100ML; MG/100ML; MG/100ML; MG/100ML
20 INJECTION, SOLUTION INTRAVENOUS CONTINUOUS
OUTPATIENT
Start: 2024-09-16

## 2024-09-16 ASSESSMENT — ENCOUNTER SYMPTOMS
HEMATOLOGIC/LYMPHATIC NEGATIVE: 1
GASTROINTESTINAL NEGATIVE: 1
NUMBNESS: 1
EYES NEGATIVE: 1
CARDIOVASCULAR NEGATIVE: 1
RESPIRATORY NEGATIVE: 1
BACK PAIN: 1
PSYCHIATRIC NEGATIVE: 1
ENDOCRINE NEGATIVE: 1
CONSTITUTIONAL NEGATIVE: 1

## 2024-09-16 ASSESSMENT — PAIN SCALES - GENERAL
PAINLEVEL: 5
PAINLEVEL_OUTOF10: 5 - MODERATE PAIN

## 2024-09-16 ASSESSMENT — PAIN - FUNCTIONAL ASSESSMENT: PAIN_FUNCTIONAL_ASSESSMENT: 0-10

## 2024-09-16 ASSESSMENT — PAIN DESCRIPTION - DESCRIPTORS: DESCRIPTORS: ACHING;NUMBNESS

## 2024-09-16 NOTE — LETTER
September 16, 2024     Lizzie Brown    Patient: Alana Ashby   YOB: 1955   Date of Visit: 9/16/2024       Dear Lizzie Brown:    Can we plz submit PA for [place] SCS trial?       Sincerely,     Lj Delcid MD      CC: No Recipients  ______________________________________________________________________________________    PAIN MANAGEMENT FOLLOW-UP OFFICE NOTE    Date of Service: 9/16/2024    SUBJECTIVE    CHIEF COMPLAINT: LBP    HISTORY OF PRESENT ILLNESS    Alana Ashby is a 68 y.o. female with PMH HTN, GERD, obesity who presents for F/U LB pain.    On 8/21, pt underwent thyroidectomy and has been recovering well. In the meantime, maintains LBP with RLE numbness. Completed psych eval. Interested in SCS    Pt denies new-onset weakness, bowel/bladder incontinence.  Pt denies recent infection, allergy to Latex/iodine/contrast. Patient is currently taking the following blood thinner(s): N/A    Procedure log:  -QUINTIN 6/25/24: 85% relief ongoing  -Caudal CHRISTIANA 6/11/24: 85% relief 1 mo, 75% 3 mo      REVIEW OF SYSTEMS  Review of Systems   Constitutional: Negative.    HENT: Negative.     Eyes: Negative.    Respiratory: Negative.     Cardiovascular: Negative.    Gastrointestinal: Negative.    Endocrine: Negative.    Musculoskeletal:  Positive for back pain and gait problem.   Skin: Negative.    Neurological:  Positive for numbness.   Hematological: Negative.    Psychiatric/Behavioral: Negative.         PAST MEDICAL HISTORY  Past Medical History:   Diagnosis Date   • Abdominal mass 06/30/2023   • Chronic pain disorder 1970?   • Elbow pain 06/30/2023   • Encounter for screening for malignant neoplasm of colon 03/26/2014    Encounter for screening colonoscopy   • Endometriosis 1991   • Epigastric pain 06/30/2023   • Knee pain 06/30/2023   • Low back pain 1970 ?   • Neck pain 2019 ?   • Other specified diseases of gallbladder 06/26/2014    Biliary dyskinesia   • Personal history of colonic polyps 04/24/2014     History of adenomatous polyp of colon   • Personal history of other diseases of the digestive system     History of esophageal reflux   • Personal history of other endocrine, nutritional and metabolic disease     History of hypercholesterolemia   • Personal history of other specified conditions 06/04/2014    History of nausea   • Right upper quadrant pain 07/10/2014    Abdominal pain, RUQ   • Thumb pain 06/30/2023     Past Surgical History:   Procedure Laterality Date   • BACK SURGERY  12/21/2022   • CHOLECYSTECTOMY  07/10/2014    Cholecystectomy Laparoscopic   • COLONOSCOPY W/ POLYPECTOMY  04/24/2014    Complete Colonoscopy For Polyp Removal   • ESOPHAGOGASTRODUODENOSCOPY  06/04/2014    Diagnostic Esophagogastroduodenoscopy   • HYSTERECTOMY  05/13/2013    Hysterectomy   • MR HEAD ANGIO WO IV CONTRAST  04/24/2015    MR HEAD ANGIO WO IV CONTRAST 4/24/2015 [facility]   • MR NECK ANGIO WO IV CONTRAST  04/24/2015    MR NECK ANGIO WO IV CONTRAST 4/24/2015 [facility]   • SPINAL FUSION  12/21/2022     Family History   Problem Relation Name Age of Onset   • Cancer Mother Janet Young    • Hypertension Mother Janet Young    • Diabetes Father Jim Young    • Stroke Father Jim Young        CURRENT MEDICATIONS  Current Outpatient Medications   Medication Sig Dispense Refill   • atorvastatin (Lipitor) 20 mg tablet Take 1 tablet (20 mg) by mouth once daily. as directed 90 tablet 3   • b complex 0.4 mg tablet Take 1 tablet by mouth once daily.     • docusate sodium (Colace) 100 mg capsule Take 100 capsules (10,000 mg) by mouth 3 times a day.     • ergocalciferol (Vitamin D2) 1.25 MG (69174 UT) capsule Take 2,000 Units by mouth 1 (one) time per week.     • ibuprofen 200 mg tablet Take by mouth every 6 hours if needed for mild pain (1 - 3).     • levothyroxine (Synthroid, Levoxyl) 125 mcg tablet Take 1 tablet (125 mcg) by mouth early in the morning.. Take on an empty stomach at the same time each day,  "either 30 to 60 minutes prior to breakfast 90 tablet 3   • lisinopril 20 mg tablet Take 1 tablet (20 mg) by mouth once daily. as directed 90 tablet 3   • magnesium oxide (Mag-Ox) 400 mg tablet 1 tablet (400 mg) once daily.     • omeprazole (PriLOSEC) 40 mg DR capsule Take 1 capsule (40 mg) by mouth once daily in the morning. Take before meals. 90 capsule 3   • Pain Reliever, acetaminophen, 500 mg tablet TAKE 1 TABLET BY MOUTH EVERY 4 TO 6 HOURS AS NEEDED     • wheat dextrin (BENEFIBER CLEAR SF, DEXTRIN, ORAL) Take by mouth.       No current facility-administered medications for this visit.       ALLERGIES AND DRUG REACTIONS  Allergies   Allergen Reactions   • Baclofen Unknown   • Gabapentin Other     Memory loss          OBJECTIVE  Visit Vitals  /75   Pulse 79   Resp 18   Ht 1.753 m (5' 9\")   Wt 97.1 kg (214 lb)   BMI 31.60 kg/m²   Smoking Status Never   BSA 2.17 m²       Last Recorded Pain Score (if available):                Physical Exam  Vitals and nursing note reviewed.     General: Sitting in chair, NAD  Head: NCAT  Eyes: Sclera/conjunctiva clear, EOMI, PERRL  Nose/mouth: MMM  CV: Good distal pulses  Lungs: Good/equal chest excursion  Abdomen: Soft, ND  Ext: No cyanosis/edema  MSK: L-spine: unremarkable alignment, BL paraspinal m TTP with L-spine TTP over surgical area, L-spine ROM: extension/flexion lmtd by pain    Neuro: AAOx3, Cn grossly nl  Dermatome sensation to light touch  LEFT L1 (lower pelvis/upper thigh): WNL    RIGHT L1: WNL      LEFT L2 (upper thigh): WNL       RIGHT: L2:WNL      LEFT L3 (medial knee): WNL       RIGHT L3: WNL      LEFT L4 (superior medial malleolus): WNL       RIGHT L4: WNL      LEFT L5 (dorsal foot): WNL       RIGHT L5: WNL      LEFT S1 (lateral foot): WNL     RIGHT S1: WNL      LEFT S2 (popliteal fossa): WNL    RIGHT S2: WNL        Motor strength  LEFT L2 (hip flexion): 5/5   RIGHT L2: 5/5  LEFT L3 (knee extension): 5/5     RIGHT L3: 5/5  LEFT L4 (dorsiflexion): 5/5     " RIGHT L4: 5/5  LEFT L5 (EHL extension): 5/5     RIGHT L5: 5/5  LEFT S1 (plantarflexion): 5/5     RIGHT S1: 5/5  LEFT S2 (knee flexion): 5/5      RIGHT S2: 5/5    Special testing  Silva: neg BL  DTR unremarkable      Psych: affect nl  Skin: no rash/lesions      REVIEW OF LABORATORY DATA  I have reviewed the following lab results:  WBC   Date Value Ref Range Status   07/30/2024 7.2 4.4 - 11.3 x10*3/uL Final     RBC   Date Value Ref Range Status   07/30/2024 3.95 (L) 4.00 - 5.20 x10*6/uL Final     Hemoglobin   Date Value Ref Range Status   07/30/2024 12.8 12.0 - 16.0 g/dL Final     Hematocrit   Date Value Ref Range Status   07/30/2024 38.2 36.0 - 46.0 % Final     MCV   Date Value Ref Range Status   07/30/2024 97 80 - 100 fL Final     MCH   Date Value Ref Range Status   07/30/2024 32.4 26.0 - 34.0 pg Final     MCHC   Date Value Ref Range Status   07/30/2024 33.5 32.0 - 36.0 g/dL Final     RDW   Date Value Ref Range Status   07/30/2024 13.0 11.5 - 14.5 % Final     Platelets   Date Value Ref Range Status   07/30/2024 205 150 - 450 x10*3/uL Final     Sodium   Date Value Ref Range Status   07/30/2024 139 136 - 145 mmol/L Final     Potassium   Date Value Ref Range Status   07/30/2024 4.2 3.5 - 5.3 mmol/L Final     Bicarbonate   Date Value Ref Range Status   07/30/2024 29 21 - 32 mmol/L Final     Urea Nitrogen   Date Value Ref Range Status   07/30/2024 17 6 - 23 mg/dL Final     Calcium   Date Value Ref Range Status   08/22/2024 8.6 8.6 - 10.6 mg/dL Final     Protime   Date Value Ref Range Status   07/30/2024 11.3 9.8 - 12.8 seconds Final     INR   Date Value Ref Range Status   07/30/2024 1.0 0.9 - 1.1 Final         REVIEW OF RADIOLOGY   I have reviewed the following:  Radiology Studies      CT c-spine 5/24/24:  No acute fracture or posttraumatic subluxation.      C2-3: No significant central canal or foraminal stenosis.  C3-4: No significant central canal or foraminal stenosis.  C4-5: Uncovertebral osteophytes are noted  more on the right side  minimally narrowing the right neuroforamen C5-6: Disc osteophyte  complex indents the ventral thecal sac. Uncovertebral osteophytes  moderately narrow the right and minimally narrow the left  neuroforamen. C6-7: No significant central canal or foraminal  stenosis. C7-T1: Hypertrophic facet changes are noted minimally  narrowing the neuroforamina.      4 mL indistinct nodule right upper lung image 417 series 201 for  instance incompletely evaluated on the current exam and nonspecific.  1.6 cm hypodense nodule left thyroid lobe with chunky calcification  incidentally noted. The thyroid gland is otherwise heterogeneous in  appearance.          IMPRESSION:  Degenerative changes of the cervical spine as above described worst  at C5-6. If further evaluation of the central canal and neuroforamina  were clinically necessary MRI could be considered.          MRI L-spine 5/23/24:  This report assumes 5 non-rib bearing lumbar vertebral bodies. The  lowest intervertebral disc will be labeled L5-S1.      There are new postoperative changes from right laminectomy at L4-L5  with posterior spinal fusion including placement of bilateral  pedicular screws which terminate within the L4 and L5 vertebral  bodies and are interconnected by parallel rods. There are also  intervertebral disc spacers at L4-L5 and L5-S1. Per clinical note,  patient underwent left L5-S1 laminectomy which is partially  visualized. Current study is not tailored to assess the surgical  hardware.      Grade 1 anterolisthesis at L4-L5 and L5-S1 seen on the prior MRI has  decreased. Otherwise, there is no striking spondylolisthesis.  Vertebral body and remaining intervertebral disc heights are  maintained. There are chronic degenerative endplate changes at few  levels including mild endplate scalloping. Marrow signal is overall  within normal limits. There is a hemangioma within the L1 vertebral  body.      The conus medullaris terminates at  the level of L1-L2 and is  unremarkable in appearance.      At T12-L1 and L1-L2, there is no posterior disc contour abnormality.  There is no spinal canal stenosis or neural foraminal narrowing.  There is no facet osteoarthropathy.      At L2-L3, there is a stable small diffuse disc bulge. There is no  spinal canal stenosis or neural foraminal narrowing. There is no  striking facet osteoarthropathy.      At L3-L4, there is a small diffuse disc bulge with a central annular  fissure which causes mild spinal canal stenosis. There is no neural  foraminal narrowing. There is no facet osteoarthropathy.      At L4-L5, there is no posterior disc contour abnormality. There is no  spinal canal stenosis or neural foraminal narrowing. It is somewhat  difficult to evaluate the facet joints due to susceptibility  artifact, noting this, bilateral facet osteoarthropathy has  decreased, likely sequela of prior facetectomy.      At L5-S1, there is no posterior disc contour abnormality. There is no  spinal canal stenosis or striking neural foraminal narrowing. Partial  visualization of left laminectomy. Bilateral facet osteoarthropathy  has decreased, possibly sequela of recent surgery.      IMPRESSION:  1. Postoperative changes from laminectomies and spinal fusion at  L4-L5 and L5-S1 with resolution of spinal canal stenosis L4-L5.      2. Otherwise, essentially stable multilevel degenerative changes as  detailed above.             XR L-spine 12/78/23:  Laminectomy posterior fusion L4 through S1. Kyphoplasty changes at  the L4 and S1 level. There is mild anterolisthesis L4-L5 and L5 on  S1, similar to the prior. The remainder of the lumbar spine is  unremarkable      IMPRESSION:  Postsurgical changes L4-S1 without evidence of hardware failure or  malalignment.         ASSESSMENT & PLAN  Alana Ashby is a 68 y.o. old female with PMH HTN, GERD, obesity who presents for F/U LB pain     1) Lumbar PLS  -s/p L4-S1 fusion 2022 worsening  since 2023 with subj RLE numbness  -Refractive to  >1 y conservative tx including Tylenol, NSAIDs, >6 w PT, gabapentin  -Saw Dr Clements 12/8 where no additional surgery was rec'd  -MRI L-spine 5/23/24: L4-S1 lami/fusion stable, multilevel spondylosis featuring L3-4 disc bulge/fissure contributing to mild stenosis  -Caudal CHRISTIANA 6/11/24: 85% relief, now diminishing  -Residual pain still bothersome and pt would like long-term solution as pain will likely return. Schedule [place] SCS trial. See detailed discussion below:  --Psych eval complete  --CT T-spine to eval canal patency    2) Neck pain, improved  -Since slip and fall 2016 radiating to BL shoulders with +Spurling, occasional R hand numbness  -Refractive to yrs of conservative tx including heat, Tylenol, NSAIDs, >6 w home exercise program  -CT C-spine 5/24/24: multilevel spondylosis featuring C5-6 disc complex indenting dura with moderate R>L NF stenosis  -C7-T1 CHRISTIANA 6/24/24: 85% ongoing relief          I spent time in the office with the patient explaining the treatment plan, the risks and potential benefits therein and what the diagnostic or F/U plan is for their pain problem moving forward. Under present circumstances, it is very reasonable at this point to offer a trial of spinal cord stimulation (SCS) to see if we improve pain, decrease  medication use and improve function and activity tolerance in a meaningful, long term fashion without the downsides of continued med mgt or opioids for long term, lifetime use.       We discussed the SCS system (electrodes and IPG), how it works, why it works, the published literature on SCS, the variety of programming options, specifically conventional and high frequency and subthreshold options; we discussed some of the pros and cons of these and what might work best for this patient.         We discussed that there are non-recharge and rechargeable batteries and the relative difference in sizes and management over time,  "depending on the anticipated power use requirements estimated when we do the trial.        We discussed where the electrodes and the IPG (battery) are placed in the body during a \"trial\" of 5-10 days, and during \"permanent\" implantation and the differences between these two steps, also, how the electrodes are secured externally during a trial and internally during permanent implantation; the lifestyle limitations with a SCS trial and permanent implant post op, with 6 weeks of no reaching overhead nor bending from the waist and being vigilant for wound infections and hte fact that not all SCS systems are completely MRI compatible; we discussed the risks of infection, electrode migration, pain at the IPG or anchor sites, mechanical or equipment failure, and use of the SCS system for leg and/or back pain, as well as mitigating factors and management strategies if these issues were to occur. We discussed that this is a long term but possibly impermanent pain control system and that in some cases, efficacy diminishes over time. We also briefly discussed the emerging technologies with regard to neuromodulation.       I spent 32 min in consultation with this patient; > 50% was spent in counseling and education on treatment of this pain disorder with SCS, infection risk 2.5% nationally, and rare instances of hardware migration, failure, breakage, loss of battery power, travel restrictions with implanted device, MRI contingencies depending on the device we select together. The patient had optimal time to ask questions during this exchange today.                         Lj Delcid MD  Anesthesiologist & Interventional Pain Physician   [facility]  O: [phone]  F: [phone]  10:28 AM  09/16/24    "

## 2024-09-16 NOTE — PROGRESS NOTES
PAIN MANAGEMENT FOLLOW-UP OFFICE NOTE    Date of Service: 9/16/2024    SUBJECTIVE    CHIEF COMPLAINT: LBP    HISTORY OF PRESENT ILLNESS    Aalna Ashby is a 68 y.o. female with PMH HTN, GERD, obesity who presents for F/U LB pain.    On 8/21, pt underwent thyroidectomy and has been recovering well. In the meantime, maintains LBP with RLE numbness. Completed psych eval. Interested in SCS    Pt denies new-onset weakness, bowel/bladder incontinence.  Pt denies recent infection, allergy to Latex/iodine/contrast. Patient is currently taking the following blood thinner(s): N/A    Procedure log:  -QUINTIN 6/25/24: 85% relief ongoing  -Caudal CHRISTIANA 6/11/24: 85% relief 1 mo, 75% 3 mo      REVIEW OF SYSTEMS  Review of Systems   Constitutional: Negative.    HENT: Negative.     Eyes: Negative.    Respiratory: Negative.     Cardiovascular: Negative.    Gastrointestinal: Negative.    Endocrine: Negative.    Musculoskeletal:  Positive for back pain and gait problem.   Skin: Negative.    Neurological:  Positive for numbness.   Hematological: Negative.    Psychiatric/Behavioral: Negative.         PAST MEDICAL HISTORY  Past Medical History:   Diagnosis Date    Abdominal mass 06/30/2023    Chronic pain disorder 1970?    Elbow pain 06/30/2023    Encounter for screening for malignant neoplasm of colon 03/26/2014    Encounter for screening colonoscopy    Endometriosis 1991    Epigastric pain 06/30/2023    Knee pain 06/30/2023    Low back pain 1970 ?    Neck pain 2019 ?    Other specified diseases of gallbladder 06/26/2014    Biliary dyskinesia    Personal history of colonic polyps 04/24/2014    History of adenomatous polyp of colon    Personal history of other diseases of the digestive system     History of esophageal reflux    Personal history of other endocrine, nutritional and metabolic disease     History of hypercholesterolemia    Personal history of other specified conditions 06/04/2014    History of nausea    Right upper quadrant pain  07/10/2014    Abdominal pain, RUQ    Thumb pain 06/30/2023     Past Surgical History:   Procedure Laterality Date    BACK SURGERY  12/21/2022    CHOLECYSTECTOMY  07/10/2014    Cholecystectomy Laparoscopic    COLONOSCOPY W/ POLYPECTOMY  04/24/2014    Complete Colonoscopy For Polyp Removal    ESOPHAGOGASTRODUODENOSCOPY  06/04/2014    Diagnostic Esophagogastroduodenoscopy    HYSTERECTOMY  05/13/2013    Hysterectomy    MR HEAD ANGIO WO IV CONTRAST  04/24/2015    MR HEAD ANGIO WO IV CONTRAST 4/24/2015 [facility]    MR NECK ANGIO WO IV CONTRAST  04/24/2015    MR NECK ANGIO WO IV CONTRAST 4/24/2015 [facility]    SPINAL FUSION  12/21/2022     Family History   Problem Relation Name Age of Onset    Cancer Mother Janet Young     Hypertension Mother Janet Young     Diabetes Father Jim Young     Stroke Father Jim Young        CURRENT MEDICATIONS  Current Outpatient Medications   Medication Sig Dispense Refill    atorvastatin (Lipitor) 20 mg tablet Take 1 tablet (20 mg) by mouth once daily. as directed 90 tablet 3    b complex 0.4 mg tablet Take 1 tablet by mouth once daily.      docusate sodium (Colace) 100 mg capsule Take 100 capsules (10,000 mg) by mouth 3 times a day.      ergocalciferol (Vitamin D2) 1.25 MG (56131 UT) capsule Take 2,000 Units by mouth 1 (one) time per week.      ibuprofen 200 mg tablet Take by mouth every 6 hours if needed for mild pain (1 - 3).      levothyroxine (Synthroid, Levoxyl) 125 mcg tablet Take 1 tablet (125 mcg) by mouth early in the morning.. Take on an empty stomach at the same time each day, either 30 to 60 minutes prior to breakfast 90 tablet 3    lisinopril 20 mg tablet Take 1 tablet (20 mg) by mouth once daily. as directed 90 tablet 3    magnesium oxide (Mag-Ox) 400 mg tablet 1 tablet (400 mg) once daily.      omeprazole (PriLOSEC) 40 mg DR capsule Take 1 capsule (40 mg) by mouth once daily in the morning. Take before meals. 90 capsule 3    Pain Reliever,  "acetaminophen, 500 mg tablet TAKE 1 TABLET BY MOUTH EVERY 4 TO 6 HOURS AS NEEDED      wheat dextrin (BENEFIBER CLEAR SF, DEXTRIN, ORAL) Take by mouth.       No current facility-administered medications for this visit.       ALLERGIES AND DRUG REACTIONS  Allergies   Allergen Reactions    Baclofen Unknown    Gabapentin Other     Memory loss          OBJECTIVE  Visit Vitals  /75   Pulse 79   Resp 18   Ht 1.753 m (5' 9\")   Wt 97.1 kg (214 lb)   BMI 31.60 kg/m²   Smoking Status Never   BSA 2.17 m²       Last Recorded Pain Score (if available):                Physical Exam  Vitals and nursing note reviewed.     General: Sitting in chair, NAD  Head: NCAT  Eyes: Sclera/conjunctiva clear, EOMI, PERRL  Nose/mouth: MMM  CV: Good distal pulses  Lungs: Good/equal chest excursion  Abdomen: Soft, ND  Ext: No cyanosis/edema  MSK: L-spine: unremarkable alignment, BL paraspinal m TTP with L-spine TTP over surgical area, L-spine ROM: extension/flexion lmtd by pain    Neuro: AAOx3, Cn grossly nl  Dermatome sensation to light touch  LEFT L1 (lower pelvis/upper thigh): WNL    RIGHT L1: WNL      LEFT L2 (upper thigh): WNL       RIGHT: L2:WNL      LEFT L3 (medial knee): WNL       RIGHT L3: WNL      LEFT L4 (superior medial malleolus): WNL       RIGHT L4: WNL      LEFT L5 (dorsal foot): WNL       RIGHT L5: WNL      LEFT S1 (lateral foot): WNL     RIGHT S1: WNL      LEFT S2 (popliteal fossa): WNL    RIGHT S2: WNL        Motor strength  LEFT L2 (hip flexion): 5/5   RIGHT L2: 5/5  LEFT L3 (knee extension): 5/5     RIGHT L3: 5/5  LEFT L4 (dorsiflexion): 5/5     RIGHT L4: 5/5  LEFT L5 (EHL extension): 5/5     RIGHT L5: 5/5  LEFT S1 (plantarflexion): 5/5     RIGHT S1: 5/5  LEFT S2 (knee flexion): 5/5      RIGHT S2: 5/5    Special testing  Silva: neg BL  DTR unremarkable      Psych: affect nl  Skin: no rash/lesions      REVIEW OF LABORATORY DATA  I have reviewed the following lab results:  WBC   Date Value Ref Range Status   07/30/2024 7.2 " 4.4 - 11.3 x10*3/uL Final     RBC   Date Value Ref Range Status   07/30/2024 3.95 (L) 4.00 - 5.20 x10*6/uL Final     Hemoglobin   Date Value Ref Range Status   07/30/2024 12.8 12.0 - 16.0 g/dL Final     Hematocrit   Date Value Ref Range Status   07/30/2024 38.2 36.0 - 46.0 % Final     MCV   Date Value Ref Range Status   07/30/2024 97 80 - 100 fL Final     MCH   Date Value Ref Range Status   07/30/2024 32.4 26.0 - 34.0 pg Final     MCHC   Date Value Ref Range Status   07/30/2024 33.5 32.0 - 36.0 g/dL Final     RDW   Date Value Ref Range Status   07/30/2024 13.0 11.5 - 14.5 % Final     Platelets   Date Value Ref Range Status   07/30/2024 205 150 - 450 x10*3/uL Final     Sodium   Date Value Ref Range Status   07/30/2024 139 136 - 145 mmol/L Final     Potassium   Date Value Ref Range Status   07/30/2024 4.2 3.5 - 5.3 mmol/L Final     Bicarbonate   Date Value Ref Range Status   07/30/2024 29 21 - 32 mmol/L Final     Urea Nitrogen   Date Value Ref Range Status   07/30/2024 17 6 - 23 mg/dL Final     Calcium   Date Value Ref Range Status   08/22/2024 8.6 8.6 - 10.6 mg/dL Final     Protime   Date Value Ref Range Status   07/30/2024 11.3 9.8 - 12.8 seconds Final     INR   Date Value Ref Range Status   07/30/2024 1.0 0.9 - 1.1 Final         REVIEW OF RADIOLOGY   I have reviewed the following:  Radiology Studies      CT c-spine 5/24/24:  No acute fracture or posttraumatic subluxation.      C2-3: No significant central canal or foraminal stenosis.  C3-4: No significant central canal or foraminal stenosis.  C4-5: Uncovertebral osteophytes are noted more on the right side  minimally narrowing the right neuroforamen C5-6: Disc osteophyte  complex indents the ventral thecal sac. Uncovertebral osteophytes  moderately narrow the right and minimally narrow the left  neuroforamen. C6-7: No significant central canal or foraminal  stenosis. C7-T1: Hypertrophic facet changes are noted minimally  narrowing the neuroforamina.      4 mL  indistinct nodule right upper lung image 417 series 201 for  instance incompletely evaluated on the current exam and nonspecific.  1.6 cm hypodense nodule left thyroid lobe with chunky calcification  incidentally noted. The thyroid gland is otherwise heterogeneous in  appearance.          IMPRESSION:  Degenerative changes of the cervical spine as above described worst  at C5-6. If further evaluation of the central canal and neuroforamina  were clinically necessary MRI could be considered.          MRI L-spine 5/23/24:  This report assumes 5 non-rib bearing lumbar vertebral bodies. The  lowest intervertebral disc will be labeled L5-S1.      There are new postoperative changes from right laminectomy at L4-L5  with posterior spinal fusion including placement of bilateral  pedicular screws which terminate within the L4 and L5 vertebral  bodies and are interconnected by parallel rods. There are also  intervertebral disc spacers at L4-L5 and L5-S1. Per clinical note,  patient underwent left L5-S1 laminectomy which is partially  visualized. Current study is not tailored to assess the surgical  hardware.      Grade 1 anterolisthesis at L4-L5 and L5-S1 seen on the prior MRI has  decreased. Otherwise, there is no striking spondylolisthesis.  Vertebral body and remaining intervertebral disc heights are  maintained. There are chronic degenerative endplate changes at few  levels including mild endplate scalloping. Marrow signal is overall  within normal limits. There is a hemangioma within the L1 vertebral  body.      The conus medullaris terminates at the level of L1-L2 and is  unremarkable in appearance.      At T12-L1 and L1-L2, there is no posterior disc contour abnormality.  There is no spinal canal stenosis or neural foraminal narrowing.  There is no facet osteoarthropathy.      At L2-L3, there is a stable small diffuse disc bulge. There is no  spinal canal stenosis or neural foraminal narrowing. There is no  striking  facet osteoarthropathy.      At L3-L4, there is a small diffuse disc bulge with a central annular  fissure which causes mild spinal canal stenosis. There is no neural  foraminal narrowing. There is no facet osteoarthropathy.      At L4-L5, there is no posterior disc contour abnormality. There is no  spinal canal stenosis or neural foraminal narrowing. It is somewhat  difficult to evaluate the facet joints due to susceptibility  artifact, noting this, bilateral facet osteoarthropathy has  decreased, likely sequela of prior facetectomy.      At L5-S1, there is no posterior disc contour abnormality. There is no  spinal canal stenosis or striking neural foraminal narrowing. Partial  visualization of left laminectomy. Bilateral facet osteoarthropathy  has decreased, possibly sequela of recent surgery.      IMPRESSION:  1. Postoperative changes from laminectomies and spinal fusion at  L4-L5 and L5-S1 with resolution of spinal canal stenosis L4-L5.      2. Otherwise, essentially stable multilevel degenerative changes as  detailed above.             XR L-spine 12/78/23:  Laminectomy posterior fusion L4 through S1. Kyphoplasty changes at  the L4 and S1 level. There is mild anterolisthesis L4-L5 and L5 on  S1, similar to the prior. The remainder of the lumbar spine is  unremarkable      IMPRESSION:  Postsurgical changes L4-S1 without evidence of hardware failure or  malalignment.         ASSESSMENT & PLAN  Alana Ashby is a 68 y.o. old female with PMH HTN, GERD, obesity who presents for F/U LB pain     1) Lumbar PLS  -s/p L4-S1 fusion 2022 worsening since 2023 with subj RLE numbness  -Refractive to  >1 y conservative tx including Tylenol, NSAIDs, >6 w PT, gabapentin  -Saw Dr Clements 12/8 where no additional surgery was rec'd  -MRI L-spine 5/23/24: L4-S1 lami/fusion stable, multilevel spondylosis featuring L3-4 disc bulge/fissure contributing to mild stenosis  -Caudal CHRISTIANA 6/11/24: 85% relief, now diminishing  -Residual pain  "still bothersome and pt would like long-term solution as pain will likely return. Schedule [facility] SCS trial. See detailed discussion below:  --Psych eval complete  --CT T-spine to eval canal patency    2) Neck pain, improved  -Since slip and fall 2016 radiating to BL shoulders with +Spurling, occasional R hand numbness  -Refractive to yrs of conservative tx including heat, Tylenol, NSAIDs, >6 w home exercise program  -CT C-spine 5/24/24: multilevel spondylosis featuring C5-6 disc complex indenting dura with moderate R>L NF stenosis  -C7-T1 CHRISTIANA 6/24/24: 85% ongoing relief          I spent time in the office with the patient explaining the treatment plan, the risks and potential benefits therein and what the diagnostic or F/U plan is for their pain problem moving forward. Under present circumstances, it is very reasonable at this point to offer a trial of spinal cord stimulation (SCS) to see if we improve pain, decrease  medication use and improve function and activity tolerance in a meaningful, long term fashion without the downsides of continued med mgt or opioids for long term, lifetime use.       We discussed the SCS system (electrodes and IPG), how it works, why it works, the published literature on SCS, the variety of programming options, specifically conventional and high frequency and subthreshold options; we discussed some of the pros and cons of these and what might work best for this patient.         We discussed that there are non-recharge and rechargeable batteries and the relative difference in sizes and management over time, depending on the anticipated power use requirements estimated when we do the trial.        We discussed where the electrodes and the IPG (battery) are placed in the body during a \"trial\" of 5-10 days, and during \"permanent\" implantation and the differences between these two steps, also, how the electrodes are secured externally during a trial and internally during permanent " implantation; the lifestyle limitations with a SCS trial and permanent implant post op, with 6 weeks of no reaching overhead nor bending from the waist and being vigilant for wound infections and hte fact that not all SCS systems are completely MRI compatible; we discussed the risks of infection, electrode migration, pain at the IPG or anchor sites, mechanical or equipment failure, and use of the SCS system for leg and/or back pain, as well as mitigating factors and management strategies if these issues were to occur. We discussed that this is a long term but possibly impermanent pain control system and that in some cases, efficacy diminishes over time. We also briefly discussed the emerging technologies with regard to neuromodulation.       I spent 32 min in consultation with this patient; > 50% was spent in counseling and education on treatment of this pain disorder with SCS, infection risk 2.5% nationally, and rare instances of hardware migration, failure, breakage, loss of battery power, travel restrictions with implanted device, MRI contingencies depending on the device we select together. The patient had optimal time to ask questions during this exchange today.                         Lj Delcid MD  Anesthesiologist & Interventional Pain Physician   [facility]  O: [phone]  F: [phone]  10:28 AM  09/16/24

## 2024-09-17 ENCOUNTER — HOSPITAL ENCOUNTER (OUTPATIENT)
Dept: RADIOLOGY | Facility: HOSPITAL | Age: 69
Discharge: HOME | End: 2024-09-17
Payer: MEDICARE

## 2024-09-17 ENCOUNTER — APPOINTMENT (OUTPATIENT)
Dept: ENDOCRINOLOGY | Facility: CLINIC | Age: 69
End: 2024-09-17
Payer: MEDICARE

## 2024-09-17 VITALS
SYSTOLIC BLOOD PRESSURE: 114 MMHG | HEART RATE: 106 BPM | WEIGHT: 216 LBS | DIASTOLIC BLOOD PRESSURE: 79 MMHG | BODY MASS INDEX: 31.9 KG/M2

## 2024-09-17 DIAGNOSIS — C73 THYROID CANCER (MULTI): Primary | ICD-10-CM

## 2024-09-17 PROCEDURE — 1160F RVW MEDS BY RX/DR IN RCRD: CPT | Performed by: INTERNAL MEDICINE

## 2024-09-17 PROCEDURE — 3074F SYST BP LT 130 MM HG: CPT | Performed by: INTERNAL MEDICINE

## 2024-09-17 PROCEDURE — 99204 OFFICE O/P NEW MOD 45 MIN: CPT | Performed by: INTERNAL MEDICINE

## 2024-09-17 PROCEDURE — 72128 CT CHEST SPINE W/O DYE: CPT | Performed by: RADIOLOGY

## 2024-09-17 PROCEDURE — 72128 CT CHEST SPINE W/O DYE: CPT

## 2024-09-17 PROCEDURE — 1036F TOBACCO NON-USER: CPT | Performed by: INTERNAL MEDICINE

## 2024-09-17 PROCEDURE — 3078F DIAST BP <80 MM HG: CPT | Performed by: INTERNAL MEDICINE

## 2024-09-17 PROCEDURE — 1159F MED LIST DOCD IN RCRD: CPT | Performed by: INTERNAL MEDICINE

## 2024-09-17 NOTE — PATIENT INSTRUCTIONS
RECOMMENDATIONS  Take levothyroxine on an empty stomach with water alone, 1 hour before eating or taking other medications, 4 hours before any calcium or iron supplement.    Draw labs in 2 weeks  Results available on [facility]  Call/message if you have not received results in 7 days.     HOLD BIOTIN (B complex, B7) for 2 days before any blood draw.     Follow up January  Repeat TSH before January appointment      **  If indicated will discuss radioactive iodine therapy:    TWO WEEKS BEFORE RADIOACTIVE IODINE THERAPY   Start LOW IODINE diet    Two days before dose:  Thyrogen injection   One day before dose:  Thyrogen injection    RADIOACTIVE IODINE THERAPY    Radiation precautions after Radioactive Iodine therapy, per hospital instructions    Two days AFTER RADIOACTIVE IODINE THERAPY   Draw Thyroglobulin  Resume normal diet    Move bowels daily after radioactive iodine therapy, until the whole body scan.  Use Milk of Magnesia for laxative, if needed.      WHOLE BODY SCAN, 7 days after therapy.      Carry hospital paperwork for 3 MONTHS after radioiodine therapy because you may set off radiation detectors.

## 2024-09-17 NOTE — PROGRESS NOTES
History Of Present Illness  Alana Ashby is a 68 y.o. female with a history of thyroid cancer.     Nodular goiter seen incidentally on CT Neck from pain specialist 3 months ago.  No neck symptoms.    Levothyroxine 125 mcg/day  Patient is taking levothyroxine on an empty stomach with water alone.    Thyroid cancer diagnosis date:  8/21/24       Type:  Papillary  Size of primary tumor:      2.2 cm, right lobe  Other foci:  0.9 cm right lobe  1.7 cm left lobe  1.3 cm left lobe  Extrathyroidal extension (ETE):  Negative  Lymph Nodes:        Distant Metastases:  None Known  Pathologic Staging:  pT2      N0      Mx.   BRAV V600E mutation positive left lobe nodules staining          Surgery:  Thyroidectomy (8/21/24)        Past Medical History  She has a past medical history of Abdominal mass (06/30/2023), Chronic pain disorder (1970?), Elbow pain (06/30/2023), Encounter for screening for malignant neoplasm of colon (03/26/2014), Endometriosis (1991), Epigastric pain (06/30/2023), Knee pain (06/30/2023), Low back pain (1970 ?), Neck pain (2019 ?), Other specified diseases of gallbladder (06/26/2014), Personal history of colonic polyps (04/24/2014), Personal history of other diseases of the digestive system, Personal history of other endocrine, nutritional and metabolic disease, Personal history of other specified conditions (06/04/2014), Right upper quadrant pain (07/10/2014), and Thumb pain (06/30/2023).    Surgical History  She has a past surgical history that includes Hysterectomy (05/13/2013); Esophagogastroduodenoscopy (06/04/2014); Cholecystectomy (07/10/2014); Colonoscopy w/ polypectomy (04/24/2014); MR angio neck wo IV contrast (04/24/2015); MR angio head wo IV contrast (04/24/2015); Back surgery (12/21/2022); and Spinal fusion (12/21/2022).     Social History  She reports that she has never smoked. She has never been exposed to tobacco smoke. She has never used smokeless tobacco. She reports that she does not  drink alcohol and does not use drugs.    Family History  Family History   Problem Relation Name Age of Onset    Cancer Mother Janet Young     Hypertension Mother Janet Young     Diabetes Father Jim Young     Stroke Father Jim Young        Medications  Current Outpatient Medications   Medication Instructions    atorvastatin (LIPITOR) 20 mg, oral, Daily, as directed    b complex 0.4 mg tablet 1 tablet, oral, Daily    docusate sodium (Colace) 100 mg capsule 100 capsules, oral, 3 times daily    ergocalciferol (VITAMIN D2) 2,000 Units, oral, Weekly    ibuprofen 200 mg tablet oral, Every 6 hours PRN    levothyroxine (SYNTHROID, LEVOXYL) 125 mcg, oral, Daily, Take on an empty stomach at the same time each day, either 30 to 60 minutes prior to breakfast    lisinopril 20 mg, oral, Daily, as directed    magnesium oxide (MAG-OX) 400 mg, Daily    omeprazole (PRILOSEC) 40 mg, oral, Daily before breakfast    Pain Reliever, acetaminophen, 500 mg tablet TAKE 1 TABLET BY MOUTH EVERY 4 TO 6 HOURS AS NEEDED    wheat dextrin (BENEFIBER CLEAR SF, DEXTRIN, ORAL) oral       Allergies  Baclofen and Gabapentin    Review of Systems      Last Recorded Vitals  Blood pressure 114/79, pulse 106, weight 98 kg (216 lb).    Physical Exam  Constitutional:       General: She is not in acute distress.  HENT:      Head: Normocephalic.      Mouth/Throat:      Mouth: Mucous membranes are moist.   Eyes:      Extraocular Movements: Extraocular movements intact.   Neck:      Comments: Thyroidectomy incision intact, mild swelling, no erythema.  No palpable gland.   Cardiovascular:      Pulses:           Radial pulses are 2+ on the right side and 2+ on the left side.   Musculoskeletal:      Right lower leg: No edema.      Left lower leg: No edema.   Lymphadenopathy:      Cervical: No cervical adenopathy.   Neurological:      Mental Status: She is alert.      Motor: No tremor.   Psychiatric:         Mood and Affect: Affect normal.          Relevant  Results  Lab Results   Component Value Date    TSH 2.30 06/24/2024           IMPRESSION  PAPILLARY THYROID CARCINOMA (PTC)  Multifocal PTC in both lobes  Negative for extrathyroidal extension or local lymph node involvement  BRAF V600E mutation is associated with invasive disease, but there are no invasive findings on her thyroidectomy.      RECOMMENDATIONS  Take levothyroxine on an empty stomach with water alone, 1 hour before eating or taking other medications, 4 hours before any calcium or iron supplement.    Draw labs in 2 weeks:  TSH, thyroglobulin  Results available on [facility]  Call/message if you have not received results in 7 days.     If thyroglobulin suspicious, I will be more likely to recommend 131-iodine therapy.     HOLD BIOTIN (B complex, B7) for 2 days before any blood draw.     Follow up January  Repeat TSH before January appointment      **  If indicated will discuss radioactive iodine therapy  Discussed rational of radioactive iodine to ablate the thyroid bed, identification and treatment of local persistence, and identification and treatment of the unlikely appearance of distant metastases  Discussed side effects including nausea, neck tenderness, dry mouth, radiation exposure.

## 2024-09-17 NOTE — LETTER
September 17, 2024     Eulalia Greene DO  [address] [facility]  [facility] [address]    Patient: Alana Ashby   YOB: 1955   Date of Visit: 9/17/2024       Dear Dr. Eulalia Greene DO:    Thank you for referring Alana Ashby to me for evaluation. Below are my notes for this consultation.  If you have questions, please do not hesitate to call me. I look forward to following your patient along with you.       Sincerely,     Rafita Mitchell MD      CC: Sundeep Taylor MD  ______________________________________________________________________________________    History Of Present Illness  Alana Ashby is a 68 y.o. female with a history of thyroid cancer.     Nodular goiter seen incidentally on CT Neck from pain specialist 3 months ago.  No neck symptoms.    Levothyroxine 125 mcg/day  Patient is taking levothyroxine on an empty stomach with water alone.    Thyroid cancer diagnosis date:  8/21/24       Type:  Papillary  Size of primary tumor:      2.2 cm, right lobe  Other foci:  0.9 cm right lobe  1.7 cm left lobe  1.3 cm left lobe  Extrathyroidal extension (ETE):  Negative  Lymph Nodes:        Distant Metastases:  None Known  Pathologic Staging:  pT2      N0      Mx.   BRAV V600E mutation positive left lobe nodules staining          Surgery:  Thyroidectomy (8/21/24)        Past Medical History  She has a past medical history of Abdominal mass (06/30/2023), Chronic pain disorder (1970?), Elbow pain (06/30/2023), Encounter for screening for malignant neoplasm of colon (03/26/2014), Endometriosis (1991), Epigastric pain (06/30/2023), Knee pain (06/30/2023), Low back pain (1970 ?), Neck pain (2019 ?), Other specified diseases of gallbladder (06/26/2014), Personal history of colonic polyps (04/24/2014), Personal history of other diseases of the digestive system, Personal history of other endocrine, nutritional and metabolic disease, Personal history of other specified conditions (06/04/2014), Right upper  quadrant pain (07/10/2014), and Thumb pain (06/30/2023).    Surgical History  She has a past surgical history that includes Hysterectomy (05/13/2013); Esophagogastroduodenoscopy (06/04/2014); Cholecystectomy (07/10/2014); Colonoscopy w/ polypectomy (04/24/2014); MR angio neck wo IV contrast (04/24/2015); MR angio head wo IV contrast (04/24/2015); Back surgery (12/21/2022); and Spinal fusion (12/21/2022).     Social History  She reports that she has never smoked. She has never been exposed to tobacco smoke. She has never used smokeless tobacco. She reports that she does not drink alcohol and does not use drugs.    Family History  Family History   Problem Relation Name Age of Onset   • Cancer Mother Janet Young    • Hypertension Mother Janet Young    • Diabetes Father Jim Young    • Stroke Father Jim Young        Medications  Current Outpatient Medications   Medication Instructions   • atorvastatin (LIPITOR) 20 mg, oral, Daily, as directed   • b complex 0.4 mg tablet 1 tablet, oral, Daily   • docusate sodium (Colace) 100 mg capsule 100 capsules, oral, 3 times daily   • ergocalciferol (VITAMIN D2) 2,000 Units, oral, Weekly   • ibuprofen 200 mg tablet oral, Every 6 hours PRN   • levothyroxine (SYNTHROID, LEVOXYL) 125 mcg, oral, Daily, Take on an empty stomach at the same time each day, either 30 to 60 minutes prior to breakfast   • lisinopril 20 mg, oral, Daily, as directed   • magnesium oxide (MAG-OX) 400 mg, Daily   • omeprazole (PRILOSEC) 40 mg, oral, Daily before breakfast   • Pain Reliever, acetaminophen, 500 mg tablet TAKE 1 TABLET BY MOUTH EVERY 4 TO 6 HOURS AS NEEDED   • wheat dextrin (BENEFIBER CLEAR SF, DEXTRIN, ORAL) oral       Allergies  Baclofen and Gabapentin    Review of Systems      Last Recorded Vitals  Blood pressure 114/79, pulse 106, weight 98 kg (216 lb).    Physical Exam  Constitutional:       General: She is not in acute distress.  HENT:      Head: Normocephalic.      Mouth/Throat:       Mouth: Mucous membranes are moist.   Eyes:      Extraocular Movements: Extraocular movements intact.   Neck:      Comments: Thyroidectomy incision intact, mild swelling, no erythema.  No palpable gland.   Cardiovascular:      Pulses:           Radial pulses are 2+ on the right side and 2+ on the left side.   Musculoskeletal:      Right lower leg: No edema.      Left lower leg: No edema.   Lymphadenopathy:      Cervical: No cervical adenopathy.   Neurological:      Mental Status: She is alert.      Motor: No tremor.   Psychiatric:         Mood and Affect: Affect normal.          Relevant Results  Lab Results   Component Value Date    TSH 2.30 06/24/2024           IMPRESSION  PAPILLARY THYROID CARCINOMA (PTC)  Multifocal PTC in both lobes  Negative for extrathyroidal extension or local lymph node involvement  BRAF V600E mutation is associated with invasive disease, but there are no invasive findings on her thyroidectomy.      RECOMMENDATIONS  Take levothyroxine on an empty stomach with water alone, 1 hour before eating or taking other medications, 4 hours before any calcium or iron supplement.    Draw labs in 2 weeks:  TSH, thyroglobulin  Results available on [facility]  Call/message if you have not received results in 7 days.     If thyroglobulin suspicious, I will be more likely to recommend 131-iodine therapy.     HOLD BIOTIN (B complex, B7) for 2 days before any blood draw.     Follow up January  Repeat TSH before January appointment      **  If indicated will discuss radioactive iodine therapy  Discussed rational of radioactive iodine to ablate the thyroid bed, identification and treatment of local persistence, and identification and treatment of the unlikely appearance of distant metastases  Discussed side effects including nausea, neck tenderness, dry mouth, radiation exposure.

## 2024-10-02 ENCOUNTER — LAB (OUTPATIENT)
Dept: LAB | Facility: LAB | Age: 69
End: 2024-10-02
Payer: MEDICARE

## 2024-10-02 DIAGNOSIS — C73 THYROID CANCER (MULTI): ICD-10-CM

## 2024-10-02 LAB — TSH SERPL-ACNC: 0.05 MIU/L (ref 0.44–3.98)

## 2024-10-02 PROCEDURE — 84443 ASSAY THYROID STIM HORMONE: CPT

## 2024-10-02 PROCEDURE — 84432 ASSAY OF THYROGLOBULIN: CPT

## 2024-10-02 PROCEDURE — 36415 COLL VENOUS BLD VENIPUNCTURE: CPT

## 2024-10-02 PROCEDURE — 86800 THYROGLOBULIN ANTIBODY: CPT

## 2024-10-04 LAB
BILL ONLY-THYROGLOBULIN: NORMAL
THYROGLOB AB SERPL-ACNC: 3.4 IU/ML (ref 0–4)
THYROGLOB SERPL-MCNC: 0.3 NG/ML (ref 1.3–31.8)
THYROGLOB SERPL-MCNC: ABNORMAL NG/ML (ref 1.3–31.8)

## 2024-10-11 ENCOUNTER — HOSPITAL ENCOUNTER (OUTPATIENT)
Dept: RADIOLOGY | Facility: HOSPITAL | Age: 69
Discharge: HOME | End: 2024-10-11
Payer: MEDICARE

## 2024-10-11 DIAGNOSIS — Z12.31 ENCOUNTER FOR SCREENING MAMMOGRAM FOR MALIGNANT NEOPLASM OF BREAST: ICD-10-CM

## 2024-10-11 PROCEDURE — 77067 SCR MAMMO BI INCL CAD: CPT

## 2024-10-13 DIAGNOSIS — E03.9 HYPOTHYROIDISM, UNSPECIFIED TYPE: ICD-10-CM

## 2024-10-13 RX ORDER — LEVOTHYROXINE SODIUM 100 UG/1
100 TABLET ORAL DAILY
Qty: 90 TABLET | Refills: 3 | Status: SHIPPED | OUTPATIENT
Start: 2024-10-13 | End: 2025-10-13

## 2024-10-17 ENCOUNTER — HOSPITAL ENCOUNTER (OUTPATIENT)
Dept: GASTROENTEROLOGY | Facility: HOSPITAL | Age: 69
Discharge: HOME | End: 2024-10-17
Payer: COMMERCIAL

## 2024-10-17 VITALS
HEIGHT: 69 IN | RESPIRATION RATE: 16 BRPM | BODY MASS INDEX: 32.14 KG/M2 | DIASTOLIC BLOOD PRESSURE: 71 MMHG | WEIGHT: 217 LBS | OXYGEN SATURATION: 97 % | HEART RATE: 80 BPM | TEMPERATURE: 97.3 F | SYSTOLIC BLOOD PRESSURE: 112 MMHG

## 2024-10-17 DIAGNOSIS — M96.1 POST LAMINECTOMY SYNDROME: ICD-10-CM

## 2024-10-17 PROCEDURE — 2780000003 HC OR 278 NO HCPCS

## 2024-10-17 PROCEDURE — 63650 IMPLANT NEUROELECTRODES: CPT | Performed by: ANESTHESIOLOGY

## 2024-10-17 PROCEDURE — 3700000012 HC SEDATION LEVEL 5+ TIME - INITIAL 15 MINUTES 5/> YEARS

## 2024-10-17 PROCEDURE — 3700000013 HC SEDATION LEVEL 5+ TIME - EACH ADDITIONAL 15 MINUTES

## 2024-10-17 PROCEDURE — 2500000004 HC RX 250 GENERAL PHARMACY W/ HCPCS (ALT 636 FOR OP/ED): Mod: JZ | Performed by: ANESTHESIOLOGY

## 2024-10-17 PROCEDURE — C1897 LEAD, NEUROSTIM TEST KIT: HCPCS

## 2024-10-17 PROCEDURE — 95972 ALYS CPLX SP/PN NPGT W/PRGRM: CPT | Performed by: ANESTHESIOLOGY

## 2024-10-17 PROCEDURE — 99152 MOD SED SAME PHYS/QHP 5/>YRS: CPT | Performed by: ANESTHESIOLOGY

## 2024-10-17 PROCEDURE — 99153 MOD SED SAME PHYS/QHP EA: CPT | Performed by: ANESTHESIOLOGY

## 2024-10-17 PROCEDURE — 2720000007 HC OR 272 NO HCPCS

## 2024-10-17 RX ORDER — FENTANYL CITRATE 50 UG/ML
INJECTION, SOLUTION INTRAMUSCULAR; INTRAVENOUS AS NEEDED
Status: COMPLETED | OUTPATIENT
Start: 2024-10-17 | End: 2024-10-17

## 2024-10-17 RX ORDER — CEFAZOLIN SODIUM 2 G/100ML
INJECTION, SOLUTION INTRAVENOUS CONTINUOUS PRN
Status: COMPLETED | OUTPATIENT
Start: 2024-10-17 | End: 2024-10-17

## 2024-10-17 RX ORDER — MIDAZOLAM HYDROCHLORIDE 1 MG/ML
INJECTION, SOLUTION INTRAMUSCULAR; INTRAVENOUS AS NEEDED
Status: COMPLETED | OUTPATIENT
Start: 2024-10-17 | End: 2024-10-17

## 2024-10-17 RX ORDER — LIDOCAINE HYDROCHLORIDE 10 MG/ML
INJECTION, SOLUTION EPIDURAL; INFILTRATION; INTRACAUDAL; PERINEURAL AS NEEDED
Status: COMPLETED | OUTPATIENT
Start: 2024-10-17 | End: 2024-10-17

## 2024-10-17 ASSESSMENT — ENCOUNTER SYMPTOMS
CARDIOVASCULAR NEGATIVE: 1
BACK PAIN: 1
OCCASIONAL FEELINGS OF UNSTEADINESS: 0
ENDOCRINE NEGATIVE: 1
NUMBNESS: 1
DEPRESSION: 0
LOSS OF SENSATION IN FEET: 0
PSYCHIATRIC NEGATIVE: 1
CONSTITUTIONAL NEGATIVE: 1
HEMATOLOGIC/LYMPHATIC NEGATIVE: 1
RESPIRATORY NEGATIVE: 1
EYES NEGATIVE: 1
GASTROINTESTINAL NEGATIVE: 1

## 2024-10-17 ASSESSMENT — PAIN - FUNCTIONAL ASSESSMENT
PAIN_FUNCTIONAL_ASSESSMENT: 0-10
PAIN_FUNCTIONAL_ASSESSMENT: 0-10

## 2024-10-17 ASSESSMENT — COLUMBIA-SUICIDE SEVERITY RATING SCALE - C-SSRS
6. HAVE YOU EVER DONE ANYTHING, STARTED TO DO ANYTHING, OR PREPARED TO DO ANYTHING TO END YOUR LIFE?: NO
2. HAVE YOU ACTUALLY HAD ANY THOUGHTS OF KILLING YOURSELF?: NO
1. IN THE PAST MONTH, HAVE YOU WISHED YOU WERE DEAD OR WISHED YOU COULD GO TO SLEEP AND NOT WAKE UP?: NO

## 2024-10-17 ASSESSMENT — PAIN SCALES - GENERAL
PAINLEVEL_OUTOF10: 3
PAINLEVEL_OUTOF10: 3

## 2024-10-17 ASSESSMENT — PAIN DESCRIPTION - DESCRIPTORS: DESCRIPTORS: ACHING

## 2024-10-17 NOTE — DISCHARGE INSTRUCTIONS
DISCHARGE INSTRUCTIONS FOR SPINAL CORD STIMULATION TRIAL     You received spinal cord stimulation leads today.     -    Okay to reinforce dressing if needed.   -    Sponge bath only (do not get leads/IPG wet)   -    Do not bend at the waist, twist and lift your arms above your shoulders during the trial. This will reduce the possibility of lead movement which may result in loss of stimulation.   -    For post procedure pain, take over-the-counter Tylenol 500 mg and ibuprofen 800 mg together every 8 hours for the next 5 days. .    -    Write in your pain journal to track your pain levels.   -    Do not drive or operate heavy machinery while stimulator is turned on.   -    Follow up with Dr. Delcid in clinic in one week.     It is recommended that you relax and limit your activity for the remainder of the day unless you have been told otherwise by your pain physician. You should not drive a car, operate machinery, or make important legal decisions unless otherwise directed by your pain physician.      Some discomfort, bruising or slight swelling may occur at the injection site. This is not abnormal if it occurs.  If needed you may:        o    Take over the counter medication such as Tylenol or Motrin.       o    Apply an ice pack for 30 minutes, 2 to 3 times a day for the first 24 hours.     You do not need to discontinue non-aspirin-containing pain medications prior to an injection (examples: Celebrex, tramadol, hydrocodone and acetaminophen).      Call the Pain Medicine Practice at [phone] between 8am-4pm Monday - Friday if you are experiencing the following:       o    Headache that does not go away with medicine, is worse when sitting or standing up, and is greatly relieved upon lying down.       o    Severe pain worse than or different than your baseline pain.       o    Chills or fever (101º F or greater).       o    Drainage or signs of infection at the injection site     Go directly to the Emergency  Department if you are experiencing the following and received an epidural or spinal injection:       o    Abrupt weakness or progressive weakness in your legs that starts after you leave the clinic.       o    Abrupt severe or worseningnumbness in your legs.       o    Inability to urinate after the injection or loss of bowel or bladder control without the urge to defecate or urinate.     If you have a clinical question that cannot wait until your next appointment, please call [phone] between 8am-4pm Monday - Friday or send a [employer] message. We do our best to return all non-emergency messages within 24 hours, Monday - Friday. A nurse or physician will return your message.      If you need to cancel an appointment please call the scheduling staff at [phone] during normal business hours or leave a message at least 24 hours in advance.

## 2024-10-17 NOTE — H&P
PAIN MANAGEMENT H&P    Date of Service: 10/17/2024  SUBJECTIVE    CHIEF COMPLAINT: LBP    HISTORY OF PRESENT ILLNESS    Alana Ashby is a 69 y.o. female with PMH HTN, GERD, obesity  who presents for SCS trial    Pain and overall medical condition unchanged from previous visit. Pt is appropriately NPO. Pt denies new-onset numbness, weakness, bowel/bladder incontinence.  Pt denies recent infection/abx use, allergy to Latex/iodine/contrast. Patient is currently taking the following blood thinner(s): N/A    REVIEW OF SYSTEMS  Review of Systems   Constitutional: Negative.    HENT: Negative.     Eyes: Negative.    Respiratory: Negative.     Cardiovascular: Negative.    Gastrointestinal: Negative.    Endocrine: Negative.    Musculoskeletal:  Positive for back pain and gait problem.   Skin: Negative.    Neurological:  Positive for numbness.   Hematological: Negative.    Psychiatric/Behavioral: Negative.         PAST MEDICAL HISTORY  Past Medical History:   Diagnosis Date    Abdominal mass 06/30/2023    Chronic pain disorder 1970?    Elbow pain 06/30/2023    Encounter for screening for malignant neoplasm of colon 03/26/2014    Encounter for screening colonoscopy    Endometriosis 1991    Epigastric pain 06/30/2023    Knee pain 06/30/2023    Low back pain 1970 ?    Neck pain 2019 ?    Other specified diseases of gallbladder 06/26/2014    Biliary dyskinesia    Personal history of colonic polyps 04/24/2014    History of adenomatous polyp of colon    Personal history of other diseases of the digestive system     History of esophageal reflux    Personal history of other endocrine, nutritional and metabolic disease     History of hypercholesterolemia    Personal history of other specified conditions 06/04/2014    History of nausea    Right upper quadrant pain 07/10/2014    Abdominal pain, RUQ    Thumb pain 06/30/2023     Past Surgical History:   Procedure Laterality Date    BACK SURGERY  12/21/2022    CHOLECYSTECTOMY  07/10/2014     Cholecystectomy Laparoscopic    COLONOSCOPY W/ POLYPECTOMY  04/24/2014    Complete Colonoscopy For Polyp Removal    ESOPHAGOGASTRODUODENOSCOPY  06/04/2014    Diagnostic Esophagogastroduodenoscopy    HYSTERECTOMY  05/13/2013    Hysterectomy    MR HEAD ANGIO WO IV CONTRAST  04/24/2015    MR HEAD ANGIO WO IV CONTRAST 4/24/2015 GEN ANCILLARY LEGACY    MR NECK ANGIO WO IV CONTRAST  04/24/2015    MR NECK ANGIO WO IV CONTRAST 4/24/2015 GEN ANCILLARY LEGACY    SPINAL FUSION  12/21/2022     Family History   Problem Relation Name Age of Onset    Cancer Mother Janet Young     Hypertension Mother Janet Young     Diabetes Father Jim Young     Stroke Father Jim Young        CURRENT MEDICATIONS  Current Outpatient Medications   Medication Sig Dispense Refill    atorvastatin (Lipitor) 20 mg tablet Take 1 tablet (20 mg) by mouth once daily. as directed 90 tablet 3    b complex 0.4 mg tablet Take 1 tablet by mouth once daily.      docusate sodium (Colace) 100 mg capsule Take 100 capsules (10,000 mg) by mouth 3 times a day.      ergocalciferol (Vitamin D2) 1.25 MG (27635 UT) capsule Take 2,000 Units by mouth 1 (one) time per week.      ibuprofen 200 mg tablet Take by mouth every 6 hours if needed for mild pain (1 - 3).      levothyroxine (Synthroid, Levoxyl) 100 mcg tablet Take 1 tablet (100 mcg) by mouth early in the morning.. 90 tablet 3    lisinopril 20 mg tablet Take 1 tablet (20 mg) by mouth once daily. as directed 90 tablet 3    magnesium oxide (Mag-Ox) 400 mg tablet 1 tablet (400 mg) once daily.      omeprazole (PriLOSEC) 40 mg DR capsule Take 1 capsule (40 mg) by mouth once daily in the morning. Take before meals. 90 capsule 3    Pain Reliever, acetaminophen, 500 mg tablet TAKE 1 TABLET BY MOUTH EVERY 4 TO 6 HOURS AS NEEDED      wheat dextrin (BENEFIBER CLEAR SF, DEXTRIN, ORAL) Take by mouth.       Current Facility-Administered Medications   Medication Dose Route Frequency Provider Last Rate Last Admin    ceFAZolin  "(Ancef) 2 g in sodium chloride 0.9% 100 mL IV  2 g intravenous Once Lj eDlcid MD           ALLERGIES AND DRUG REACTIONS  Allergies   Allergen Reactions    Baclofen Unknown    Gabapentin Other     Memory loss          OBJECTIVE  Visit Vitals  BP (!) 130/93   Pulse 89   Temp 36.3 °C (97.3 °F) (Temporal)   Resp 16   Ht 1.753 m (5' 9\")   Wt 98.4 kg (217 lb)   SpO2 100%   BMI 32.05 kg/m²   Smoking Status Never   BSA 2.19 m²     General: Lying comfortably in bed, NAD  Head: NCAT  Eyes: Sclera/conjunctiva clear, EOMI, PERRL  Nose/mouth: MMM  CV: Good distal pulses  Lungs: Good/equal chest excursion  Abdomen: Soft, ND  Ext: No cyanosis/edema  MSK: Able to move extremities  Neuro: AAOx3, grossly normal  Psych: affect nl      REVIEW OF LABORATORY DATA  I have reviewed the following lab results:  No results found for: \"WBC\", \"RBC\", \"HGB\", \"HCT\", \"MCV\", \"MCH\", \"MCHC\", \"RDW\", \"PLT\", \"MPV\"  No results found for: \"NA\", \"K\", \"CO2\", \"BUN\", \"CALCIUM\"  No results found for: \"PROTIME\", \"PTT\", \"INR\", \"FIBRINOGEN\"      REVIEW OF RADIOLOGY DATA  I have reviewed the following:  Radiology Studies           MRI L-spine 5/23/24:   L4-S1 lami/fusion stable, multilevel spondylosis featuring L3-4 disc bulge/fissure contributing to mild stenosis      ASSESSMENT & PLAN  Alana Ashby is a 69 y.o. female with PMH  HTN, GERD, obesity  who presents for SCS trial    1) Lumbar PLS  -s/p L4-S1 fusion 2022 worsening since 2023 with subj RLE numbness  -Refractive to  >1 y conservative tx including Tylenol, NSAIDs, >6 w PT, gabapentin, [facility]STIANA  -Saw Dr Clements 12/8 where no additional surgery was rec'd  -MRI L-spine 5/23/24: L4-S1 lami/fusion stable, multilevel spondylosis featuring L3-4 disc bulge/fissure contributing to mild stenosis  -Caudal CHRISTIANA 6/11/24: 85% relief, now diminishing  -[place] SCS trial w/ IV sed today to target pain generator as seen on imaging and minimize risk/likelihood of chronic opioid use and/or surgery. ASA 2          Discussed " procedure risks/benefits in detail with patient. Pt meets medical necessity for procedure due to failure of conservative measures. Reviewed procedural risks including bleeding, infection, nerve damage, paralysis. Also reviewed mitigating factors such as screening for infection/blood thinner use, sterile precautions, and image-guidance when applicable. All questions answered. Pt/guardian expressed understanding and choose to proceed            Lj Delcid MD  Anesthesiologist & Interventional Pain Physician   [facility] [place]  O: [phone]  F: [phone]  12:50 PM  10/17/24

## 2024-10-17 NOTE — NURSING NOTE
Pt arrived in stable condition, denies any neuro deficits, dressing dry and intact. Joss with [facility] at bedside doing discharge teaching.

## 2024-10-23 ENCOUNTER — OFFICE VISIT (OUTPATIENT)
Dept: PAIN MEDICINE | Facility: CLINIC | Age: 69
End: 2024-10-23
Payer: MEDICARE

## 2024-10-23 VITALS
RESPIRATION RATE: 16 BRPM | BODY MASS INDEX: 32.14 KG/M2 | SYSTOLIC BLOOD PRESSURE: 135 MMHG | HEART RATE: 83 BPM | WEIGHT: 217 LBS | HEIGHT: 69 IN | OXYGEN SATURATION: 97 % | DIASTOLIC BLOOD PRESSURE: 83 MMHG

## 2024-10-23 DIAGNOSIS — M96.1 POSTLAMINECTOMY SYNDROME OF LUMBAR REGION: Primary | ICD-10-CM

## 2024-10-23 PROCEDURE — 3008F BODY MASS INDEX DOCD: CPT | Performed by: ANESTHESIOLOGY

## 2024-10-23 PROCEDURE — 3079F DIAST BP 80-89 MM HG: CPT | Performed by: ANESTHESIOLOGY

## 2024-10-23 PROCEDURE — 1160F RVW MEDS BY RX/DR IN RCRD: CPT | Performed by: ANESTHESIOLOGY

## 2024-10-23 PROCEDURE — 3075F SYST BP GE 130 - 139MM HG: CPT | Performed by: ANESTHESIOLOGY

## 2024-10-23 PROCEDURE — 1125F AMNT PAIN NOTED PAIN PRSNT: CPT | Performed by: ANESTHESIOLOGY

## 2024-10-23 PROCEDURE — 1159F MED LIST DOCD IN RCRD: CPT | Performed by: ANESTHESIOLOGY

## 2024-10-23 PROCEDURE — 99024 POSTOP FOLLOW-UP VISIT: CPT | Performed by: ANESTHESIOLOGY

## 2024-10-23 PROCEDURE — 99214 OFFICE O/P EST MOD 30 MIN: CPT | Performed by: ANESTHESIOLOGY

## 2024-10-23 PROCEDURE — 1036F TOBACCO NON-USER: CPT | Performed by: ANESTHESIOLOGY

## 2024-10-23 ASSESSMENT — PAIN SCALES - GENERAL
PAINLEVEL_OUTOF10: 4
PAINLEVEL_OUTOF10: 4

## 2024-10-23 ASSESSMENT — PAIN DESCRIPTION - DESCRIPTORS: DESCRIPTORS: DULL;ACHING

## 2024-10-23 ASSESSMENT — ENCOUNTER SYMPTOMS
PSYCHIATRIC NEGATIVE: 1
NUMBNESS: 1
EYES NEGATIVE: 1
ENDOCRINE NEGATIVE: 1
RESPIRATORY NEGATIVE: 1
GASTROINTESTINAL NEGATIVE: 1
CARDIOVASCULAR NEGATIVE: 1
BACK PAIN: 1
CONSTITUTIONAL NEGATIVE: 1
HEMATOLOGIC/LYMPHATIC NEGATIVE: 1

## 2024-10-23 ASSESSMENT — PAIN - FUNCTIONAL ASSESSMENT: PAIN_FUNCTIONAL_ASSESSMENT: 0-10

## 2024-10-23 NOTE — PROGRESS NOTES
PAIN MANAGEMENT FOLLOW-UP OFFICE NOTE    Date of Service: 10/23/2024    SUBJECTIVE    CHIEF COMPLAINT: LBP    HISTORY OF PRESENT ILLNESS    Alana Ashby is a 69 y.o. female with PMH HTN, GERD, obesity who presents for F/U LB pain.    On 10/17, pt underwent [place] Scs trial with 60% relief. She notes improved activity tolerance and was able to enjoy a road trip. Improved standing/walking and was able to grocery shop without inhibition for the first time in years.     Pt denies new-onset weakness, bowel/bladder incontinence.  Pt denies recent infection, allergy to Latex/iodine/contrast. Patient is currently taking the following blood thinner(s): N/A    Procedure log:  -[place] SCS trial 10/17/24: 60% relief  -QUINTIN 6/25/24: 85% relief ongoing  -Caudal CHRISTIANA 6/11/24: 85% relief 1 mo, 75% 3 mo      REVIEW OF SYSTEMS  Review of Systems   Constitutional: Negative.    HENT: Negative.     Eyes: Negative.    Respiratory: Negative.     Cardiovascular: Negative.    Gastrointestinal: Negative.    Endocrine: Negative.    Musculoskeletal:  Positive for back pain and gait problem.   Skin: Negative.    Neurological:  Positive for numbness.   Hematological: Negative.    Psychiatric/Behavioral: Negative.         PAST MEDICAL HISTORY  Past Medical History:   Diagnosis Date    Abdominal mass 06/30/2023    Chronic pain disorder 1970?    Elbow pain 06/30/2023    Encounter for screening for malignant neoplasm of colon 03/26/2014    Encounter for screening colonoscopy    Endometriosis 1991    Epigastric pain 06/30/2023    Knee pain 06/30/2023    Low back pain 1970 ?    Neck pain 2019 ?    Other specified diseases of gallbladder 06/26/2014    Biliary dyskinesia    Personal history of colonic polyps 04/24/2014    History of adenomatous polyp of colon    Personal history of other diseases of the digestive system     History of esophageal reflux    Personal history of other endocrine, nutritional and metabolic disease     History of  hypercholesterolemia    Personal history of other specified conditions 06/04/2014    History of nausea    Right upper quadrant pain 07/10/2014    Abdominal pain, RUQ    Thumb pain 06/30/2023     Past Surgical History:   Procedure Laterality Date    BACK SURGERY  12/21/2022    CHOLECYSTECTOMY  07/10/2014    Cholecystectomy Laparoscopic    COLONOSCOPY W/ POLYPECTOMY  04/24/2014    Complete Colonoscopy For Polyp Removal    ESOPHAGOGASTRODUODENOSCOPY  06/04/2014    Diagnostic Esophagogastroduodenoscopy    HYSTERECTOMY  05/13/2013    Hysterectomy    MR HEAD ANGIO WO IV CONTRAST  04/24/2015    MR HEAD ANGIO WO IV CONTRAST 4/24/2015 [facility]    MR NECK ANGIO WO IV CONTRAST  04/24/2015    MR NECK ANGIO WO IV CONTRAST 4/24/2015 [facility]    SPINAL FUSION  12/21/2022    THYROIDECTOMY Bilateral     08/21/2024     Family History   Problem Relation Name Age of Onset    Cancer Mother Janet Young     Hypertension Mother Janet Young     Diabetes Father Jim Young     Stroke Father Jim Young        CURRENT MEDICATIONS  Current Outpatient Medications   Medication Sig Dispense Refill    atorvastatin (Lipitor) 20 mg tablet Take 1 tablet (20 mg) by mouth once daily. as directed 90 tablet 3    b complex 0.4 mg tablet Take 1 tablet by mouth once daily.      ergocalciferol (Vitamin D2) 1.25 MG (53647 UT) capsule Take 2,000 Units by mouth 1 (one) time per week.      ibuprofen 200 mg tablet Take by mouth every 6 hours if needed for mild pain (1 - 3).      levothyroxine (Synthroid, Levoxyl) 100 mcg tablet Take 1 tablet (100 mcg) by mouth early in the morning.. 90 tablet 3    lisinopril 20 mg tablet Take 1 tablet (20 mg) by mouth once daily. as directed 90 tablet 3    magnesium oxide (Mag-Ox) 400 mg tablet 1 tablet (400 mg) once daily.      omeprazole (PriLOSEC) 40 mg DR capsule Take 1 capsule (40 mg) by mouth once daily in the morning. Take before meals. 90 capsule 3    Pain Reliever, acetaminophen, 500 mg  "tablet TAKE 1 TABLET BY MOUTH EVERY 4 TO 6 HOURS AS NEEDED      wheat dextrin (BENEFIBER CLEAR SF, DEXTRIN, ORAL) Take by mouth.       No current facility-administered medications for this visit.       ALLERGIES AND DRUG REACTIONS  Allergies   Allergen Reactions    Baclofen Unknown    Gabapentin Other     Memory loss          OBJECTIVE  Visit Vitals  /83   Pulse 83   Resp 16   Ht 1.753 m (5' 9\")   Wt 98.4 kg (217 lb)   SpO2 97%   BMI 32.05 kg/m²   Smoking Status Never   BSA 2.19 m²       Last Recorded Pain Score (if available):                Physical Exam  Vitals and nursing note reviewed.     General: Sitting in chair, NAD  Head: NCAT  Eyes: Sclera/conjunctiva clear, EOMI, PERRL  Nose/mouth: MMM  CV: Good distal pulses  Lungs: Good/equal chest excursion  Abdomen: Soft, ND  Ext: No cyanosis/edema  MSK: L-spine: unremarkable alignment, BL paraspinal m TTP with L-spine TTP over surgical area, L-spine ROM: extension/flexion lmtd by pain    Neuro: AAOx3, Cn grossly nl  Dermatome sensation to light touch  LEFT L1 (lower pelvis/upper thigh): WNL    RIGHT L1: WNL      LEFT L2 (upper thigh): WNL       RIGHT: L2:WNL      LEFT L3 (medial knee): WNL       RIGHT L3: WNL      LEFT L4 (superior medial malleolus): WNL       RIGHT L4: WNL      LEFT L5 (dorsal foot): WNL       RIGHT L5: WNL      LEFT S1 (lateral foot): WNL     RIGHT S1: WNL      LEFT S2 (popliteal fossa): WNL    RIGHT S2: WNL        Motor strength  LEFT L2 (hip flexion): 5/5   RIGHT L2: 5/5  LEFT L3 (knee extension): 5/5     RIGHT L3: 5/5  LEFT L4 (dorsiflexion): 5/5     RIGHT L4: 5/5  LEFT L5 (EHL extension): 5/5     RIGHT L5: 5/5  LEFT S1 (plantarflexion): 5/5     RIGHT S1: 5/5  LEFT S2 (knee flexion): 5/5      RIGHT S2: 5/5      Psych: affect nl  Skin: no rash/lesions, SCS leads removed with tips intact. Hemostasis achieved. Stab wounds cleaned/dressed with band-aids      REVIEW OF LABORATORY DATA  I have reviewed the following lab results:  WBC   Date " Value Ref Range Status   07/30/2024 7.2 4.4 - 11.3 x10*3/uL Final     RBC   Date Value Ref Range Status   07/30/2024 3.95 (L) 4.00 - 5.20 x10*6/uL Final     Hemoglobin   Date Value Ref Range Status   07/30/2024 12.8 12.0 - 16.0 g/dL Final     Hematocrit   Date Value Ref Range Status   07/30/2024 38.2 36.0 - 46.0 % Final     MCV   Date Value Ref Range Status   07/30/2024 97 80 - 100 fL Final     MCH   Date Value Ref Range Status   07/30/2024 32.4 26.0 - 34.0 pg Final     MCHC   Date Value Ref Range Status   07/30/2024 33.5 32.0 - 36.0 g/dL Final     RDW   Date Value Ref Range Status   07/30/2024 13.0 11.5 - 14.5 % Final     Platelets   Date Value Ref Range Status   07/30/2024 205 150 - 450 x10*3/uL Final     Sodium   Date Value Ref Range Status   07/30/2024 139 136 - 145 mmol/L Final     Potassium   Date Value Ref Range Status   07/30/2024 4.2 3.5 - 5.3 mmol/L Final     Bicarbonate   Date Value Ref Range Status   07/30/2024 29 21 - 32 mmol/L Final     Urea Nitrogen   Date Value Ref Range Status   07/30/2024 17 6 - 23 mg/dL Final     Calcium   Date Value Ref Range Status   08/22/2024 8.6 8.6 - 10.6 mg/dL Final     Protime   Date Value Ref Range Status   07/30/2024 11.3 9.8 - 12.8 seconds Final     INR   Date Value Ref Range Status   07/30/2024 1.0 0.9 - 1.1 Final         REVIEW OF RADIOLOGY   I have reviewed the following:  Radiology Studies      CT c-spine 5/24/24:  No acute fracture or posttraumatic subluxation.      C2-3: No significant central canal or foraminal stenosis.  C3-4: No significant central canal or foraminal stenosis.  C4-5: Uncovertebral osteophytes are noted more on the right side  minimally narrowing the right neuroforamen C5-6: Disc osteophyte  complex indents the ventral thecal sac. Uncovertebral osteophytes  moderately narrow the right and minimally narrow the left  neuroforamen. C6-7: No significant central canal or foraminal  stenosis. C7-T1: Hypertrophic facet changes are noted  minimally  narrowing the neuroforamina.      4 mL indistinct nodule right upper lung image 417 series 201 for  instance incompletely evaluated on the current exam and nonspecific.  1.6 cm hypodense nodule left thyroid lobe with chunky calcification  incidentally noted. The thyroid gland is otherwise heterogeneous in  appearance.          IMPRESSION:  Degenerative changes of the cervical spine as above described worst  at C5-6. If further evaluation of the central canal and neuroforamina  were clinically necessary MRI could be considered.          MRI L-spine 5/23/24:  This report assumes 5 non-rib bearing lumbar vertebral bodies. The  lowest intervertebral disc will be labeled L5-S1.      There are new postoperative changes from right laminectomy at L4-L5  with posterior spinal fusion including placement of bilateral  pedicular screws which terminate within the L4 and L5 vertebral  bodies and are interconnected by parallel rods. There are also  intervertebral disc spacers at L4-L5 and L5-S1. Per clinical note,  patient underwent left L5-S1 laminectomy which is partially  visualized. Current study is not tailored to assess the surgical  hardware.      Grade 1 anterolisthesis at L4-L5 and L5-S1 seen on the prior MRI has  decreased. Otherwise, there is no striking spondylolisthesis.  Vertebral body and remaining intervertebral disc heights are  maintained. There are chronic degenerative endplate changes at few  levels including mild endplate scalloping. Marrow signal is overall  within normal limits. There is a hemangioma within the L1 vertebral  body.      The conus medullaris terminates at the level of L1-L2 and is  unremarkable in appearance.      At T12-L1 and L1-L2, there is no posterior disc contour abnormality.  There is no spinal canal stenosis or neural foraminal narrowing.  There is no facet osteoarthropathy.      At L2-L3, there is a stable small diffuse disc bulge. There is no  spinal canal stenosis or  neural foraminal narrowing. There is no  striking facet osteoarthropathy.      At L3-L4, there is a small diffuse disc bulge with a central annular  fissure which causes mild spinal canal stenosis. There is no neural  foraminal narrowing. There is no facet osteoarthropathy.      At L4-L5, there is no posterior disc contour abnormality. There is no  spinal canal stenosis or neural foraminal narrowing. It is somewhat  difficult to evaluate the facet joints due to susceptibility  artifact, noting this, bilateral facet osteoarthropathy has  decreased, likely sequela of prior facetectomy.      At L5-S1, there is no posterior disc contour abnormality. There is no  spinal canal stenosis or striking neural foraminal narrowing. Partial  visualization of left laminectomy. Bilateral facet osteoarthropathy  has decreased, possibly sequela of recent surgery.      IMPRESSION:  1. Postoperative changes from laminectomies and spinal fusion at  L4-L5 and L5-S1 with resolution of spinal canal stenosis L4-L5.      2. Otherwise, essentially stable multilevel degenerative changes as  detailed above.             XR L-spine 12/78/23:  Laminectomy posterior fusion L4 through S1. Kyphoplasty changes at  the L4 and S1 level. There is mild anterolisthesis L4-L5 and L5 on  S1, similar to the prior. The remainder of the lumbar spine is  unremarkable      IMPRESSION:  Postsurgical changes L4-S1 without evidence of hardware failure or  malalignment.         ASSESSMENT & PLAN  Alana Ashby is a 69 y.o. female with PMH HTN, GERD, obesity who presents for F/U LB pain     1) Lumbar PLS  -s/p L4-S1 fusion 2022 worsening since 2023 with subj RLE numbness  -Refractive to  >1 y conservative tx including Tylenol, NSAIDs, >6 w PT, gabapentin  -Saw Dr Clements 12/8 where no additional surgery was rec'd  -MRI L-spine 5/23/24: L4-S1 lami/fusion stable, multilevel spondylosis featuring L3-4 disc bulge/fissure contributing to mild stenosis  -Caudal CHRISTIANA 6/11/24:  85% relief, now diminishing  -[place] SCS trial 10/17/24: 60% relief. Improved standing, walking, activity tolerance. Tolerating chores better and improved QoL. Wants to move fwd with implant  -Schedule [place] SCS implant to target pain generator as seen on imaging and minimize risk/likelihood of chronic opioid use and/or surgery          Discussed procedure risks/benefits in detail with patient. Pt meets medical necessity for procedure due to failure of conservative measures. Reviewed procedural risks including bleeding, infection, nerve damage, paralysis. Also reviewed mitigating factors such as screening for infection/blood thinner use, sterile precautions, and image-guidance when applicable. All questions answered. Pt/guardian expressed understanding and choose to proceed                   Lj Delcid MD  Anesthesiologist & Interventional Pain Physician   [facility]  O: [phone]  F: [phone]  3:27 PM  10/23/24

## 2024-10-23 NOTE — LETTER
October 23, 2024     Lizzie Brown    Patient: Alana Ashby   YOB: 1955   Date of Visit: 10/23/2024       Dear Lizzie Brown:    Plz submit PA for [facility] SCS implant       Sincerely,     Lj Delcid MD      CC: No Recipients  ______________________________________________________________________________________    PAIN MANAGEMENT FOLLOW-UP OFFICE NOTE    Date of Service: 10/23/2024    SUBJECTIVE    CHIEF COMPLAINT: LBP    HISTORY OF PRESENT ILLNESS    Alana Ashby is a 69 y.o. female with PMH HTN, GERD, obesity who presents for F/U LB pain.    On 10/17, pt underwent [place] Scs trial with 60% relief. She notes improved activity tolerance and was able to enjoy a road trip. Improved standing/walking and was able to grocery shop without inhibition for the first time in years.     Pt denies new-onset weakness, bowel/bladder incontinence.  Pt denies recent infection, allergy to Latex/iodine/contrast. Patient is currently taking the following blood thinner(s): N/A    Procedure log:  -[place] SCS trial 10/17/24: 60% relief  -QUINTIN 6/25/24: 85% relief ongoing  -Caudal CHRISTIANA 6/11/24: 85% relief 1 mo, 75% 3 mo      REVIEW OF SYSTEMS  Review of Systems   Constitutional: Negative.    HENT: Negative.     Eyes: Negative.    Respiratory: Negative.     Cardiovascular: Negative.    Gastrointestinal: Negative.    Endocrine: Negative.    Musculoskeletal:  Positive for back pain and gait problem.   Skin: Negative.    Neurological:  Positive for numbness.   Hematological: Negative.    Psychiatric/Behavioral: Negative.         PAST MEDICAL HISTORY  Past Medical History:   Diagnosis Date   • Abdominal mass 06/30/2023   • Chronic pain disorder 1970?   • Elbow pain 06/30/2023   • Encounter for screening for malignant neoplasm of colon 03/26/2014    Encounter for screening colonoscopy   • Endometriosis 1991   • Epigastric pain 06/30/2023   • Knee pain 06/30/2023   • Low back pain 1970 ?   • Neck pain 2019 ?   • Other  specified diseases of gallbladder 06/26/2014    Biliary dyskinesia   • Personal history of colonic polyps 04/24/2014    History of adenomatous polyp of colon   • Personal history of other diseases of the digestive system     History of esophageal reflux   • Personal history of other endocrine, nutritional and metabolic disease     History of hypercholesterolemia   • Personal history of other specified conditions 06/04/2014    History of nausea   • Right upper quadrant pain 07/10/2014    Abdominal pain, RUQ   • Thumb pain 06/30/2023     Past Surgical History:   Procedure Laterality Date   • BACK SURGERY  12/21/2022   • CHOLECYSTECTOMY  07/10/2014    Cholecystectomy Laparoscopic   • COLONOSCOPY W/ POLYPECTOMY  04/24/2014    Complete Colonoscopy For Polyp Removal   • ESOPHAGOGASTRODUODENOSCOPY  06/04/2014    Diagnostic Esophagogastroduodenoscopy   • HYSTERECTOMY  05/13/2013    Hysterectomy   • MR HEAD ANGIO WO IV CONTRAST  04/24/2015    MR HEAD ANGIO WO IV CONTRAST 4/24/2015 [facility]   • MR NECK ANGIO WO IV CONTRAST  04/24/2015    MR NECK ANGIO WO IV CONTRAST 4/24/2015 [facility]   • SPINAL FUSION  12/21/2022   • THYROIDECTOMY Bilateral     08/21/2024     Family History   Problem Relation Name Age of Onset   • Cancer Mother Janet Young    • Hypertension Mother Janet Young    • Diabetes Father Jim Young    • Stroke Father Jim Young        CURRENT MEDICATIONS  Current Outpatient Medications   Medication Sig Dispense Refill   • atorvastatin (Lipitor) 20 mg tablet Take 1 tablet (20 mg) by mouth once daily. as directed 90 tablet 3   • b complex 0.4 mg tablet Take 1 tablet by mouth once daily.     • ergocalciferol (Vitamin D2) 1.25 MG (19985 UT) capsule Take 2,000 Units by mouth 1 (one) time per week.     • ibuprofen 200 mg tablet Take by mouth every 6 hours if needed for mild pain (1 - 3).     • levothyroxine (Synthroid, Levoxyl) 100 mcg tablet Take 1 tablet (100 mcg) by mouth early in the  "morning.. 90 tablet 3   • lisinopril 20 mg tablet Take 1 tablet (20 mg) by mouth once daily. as directed 90 tablet 3   • magnesium oxide (Mag-Ox) 400 mg tablet 1 tablet (400 mg) once daily.     • omeprazole (PriLOSEC) 40 mg DR capsule Take 1 capsule (40 mg) by mouth once daily in the morning. Take before meals. 90 capsule 3   • Pain Reliever, acetaminophen, 500 mg tablet TAKE 1 TABLET BY MOUTH EVERY 4 TO 6 HOURS AS NEEDED     • wheat dextrin (BENEFIBER CLEAR SF, DEXTRIN, ORAL) Take by mouth.       No current facility-administered medications for this visit.       ALLERGIES AND DRUG REACTIONS  Allergies   Allergen Reactions   • Baclofen Unknown   • Gabapentin Other     Memory loss          OBJECTIVE  Visit Vitals  /83   Pulse 83   Resp 16   Ht 1.753 m (5' 9\")   Wt 98.4 kg (217 lb)   SpO2 97%   BMI 32.05 kg/m²   Smoking Status Never   BSA 2.19 m²       Last Recorded Pain Score (if available):                Physical Exam  Vitals and nursing note reviewed.     General: Sitting in chair, NAD  Head: NCAT  Eyes: Sclera/conjunctiva clear, EOMI, PERRL  Nose/mouth: MMM  CV: Good distal pulses  Lungs: Good/equal chest excursion  Abdomen: Soft, ND  Ext: No cyanosis/edema  MSK: L-spine: unremarkable alignment, BL paraspinal m TTP with L-spine TTP over surgical area, L-spine ROM: extension/flexion lmtd by pain    Neuro: AAOx3, Cn grossly nl  Dermatome sensation to light touch  LEFT L1 (lower pelvis/upper thigh): WNL    RIGHT L1: WNL      LEFT L2 (upper thigh): WNL       RIGHT: L2:WNL      LEFT L3 (medial knee): WNL       RIGHT L3: WNL      LEFT L4 (superior medial malleolus): WNL       RIGHT L4: WNL      LEFT L5 (dorsal foot): WNL       RIGHT L5: WNL      LEFT S1 (lateral foot): WNL     RIGHT S1: WNL      LEFT S2 (popliteal fossa): WNL    RIGHT S2: WNL        Motor strength  LEFT L2 (hip flexion): 5/5   RIGHT L2: 5/5  LEFT L3 (knee extension): 5/5     RIGHT L3: 5/5  LEFT L4 (dorsiflexion): 5/5     RIGHT L4: 5/5  LEFT L5 " (EHL extension): 5/5     RIGHT L5: 5/5  LEFT S1 (plantarflexion): 5/5     RIGHT S1: 5/5  LEFT S2 (knee flexion): 5/5      RIGHT S2: 5/5      Psych: affect nl  Skin: no rash/lesions, SCS leads removed with tips intact. Hemostasis achieved. Stab wounds cleaned/dressed with band-aids      REVIEW OF LABORATORY DATA  I have reviewed the following lab results:  WBC   Date Value Ref Range Status   07/30/2024 7.2 4.4 - 11.3 x10*3/uL Final     RBC   Date Value Ref Range Status   07/30/2024 3.95 (L) 4.00 - 5.20 x10*6/uL Final     Hemoglobin   Date Value Ref Range Status   07/30/2024 12.8 12.0 - 16.0 g/dL Final     Hematocrit   Date Value Ref Range Status   07/30/2024 38.2 36.0 - 46.0 % Final     MCV   Date Value Ref Range Status   07/30/2024 97 80 - 100 fL Final     MCH   Date Value Ref Range Status   07/30/2024 32.4 26.0 - 34.0 pg Final     MCHC   Date Value Ref Range Status   07/30/2024 33.5 32.0 - 36.0 g/dL Final     RDW   Date Value Ref Range Status   07/30/2024 13.0 11.5 - 14.5 % Final     Platelets   Date Value Ref Range Status   07/30/2024 205 150 - 450 x10*3/uL Final     Sodium   Date Value Ref Range Status   07/30/2024 139 136 - 145 mmol/L Final     Potassium   Date Value Ref Range Status   07/30/2024 4.2 3.5 - 5.3 mmol/L Final     Bicarbonate   Date Value Ref Range Status   07/30/2024 29 21 - 32 mmol/L Final     Urea Nitrogen   Date Value Ref Range Status   07/30/2024 17 6 - 23 mg/dL Final     Calcium   Date Value Ref Range Status   08/22/2024 8.6 8.6 - 10.6 mg/dL Final     Protime   Date Value Ref Range Status   07/30/2024 11.3 9.8 - 12.8 seconds Final     INR   Date Value Ref Range Status   07/30/2024 1.0 0.9 - 1.1 Final         REVIEW OF RADIOLOGY   I have reviewed the following:  Radiology Studies      CT c-spine 5/24/24:  No acute fracture or posttraumatic subluxation.      C2-3: No significant central canal or foraminal stenosis.  C3-4: No significant central canal or foraminal stenosis.  C4-5: Uncovertebral  osteophytes are noted more on the right side  minimally narrowing the right neuroforamen C5-6: Disc osteophyte  complex indents the ventral thecal sac. Uncovertebral osteophytes  moderately narrow the right and minimally narrow the left  neuroforamen. C6-7: No significant central canal or foraminal  stenosis. C7-T1: Hypertrophic facet changes are noted minimally  narrowing the neuroforamina.      4 mL indistinct nodule right upper lung image 417 series 201 for  instance incompletely evaluated on the current exam and nonspecific.  1.6 cm hypodense nodule left thyroid lobe with chunky calcification  incidentally noted. The thyroid gland is otherwise heterogeneous in  appearance.          IMPRESSION:  Degenerative changes of the cervical spine as above described worst  at C5-6. If further evaluation of the central canal and neuroforamina  were clinically necessary MRI could be considered.          MRI L-spine 5/23/24:  This report assumes 5 non-rib bearing lumbar vertebral bodies. The  lowest intervertebral disc will be labeled L5-S1.      There are new postoperative changes from right laminectomy at L4-L5  with posterior spinal fusion including placement of bilateral  pedicular screws which terminate within the L4 and L5 vertebral  bodies and are interconnected by parallel rods. There are also  intervertebral disc spacers at L4-L5 and L5-S1. Per clinical note,  patient underwent left L5-S1 laminectomy which is partially  visualized. Current study is not tailored to assess the surgical  hardware.      Grade 1 anterolisthesis at L4-L5 and L5-S1 seen on the prior MRI has  decreased. Otherwise, there is no striking spondylolisthesis.  Vertebral body and remaining intervertebral disc heights are  maintained. There are chronic degenerative endplate changes at few  levels including mild endplate scalloping. Marrow signal is overall  within normal limits. There is a hemangioma within the L1 vertebral  body.      The conus  medullaris terminates at the level of L1-L2 and is  unremarkable in appearance.      At T12-L1 and L1-L2, there is no posterior disc contour abnormality.  There is no spinal canal stenosis or neural foraminal narrowing.  There is no facet osteoarthropathy.      At L2-L3, there is a stable small diffuse disc bulge. There is no  spinal canal stenosis or neural foraminal narrowing. There is no  striking facet osteoarthropathy.      At L3-L4, there is a small diffuse disc bulge with a central annular  fissure which causes mild spinal canal stenosis. There is no neural  foraminal narrowing. There is no facet osteoarthropathy.      At L4-L5, there is no posterior disc contour abnormality. There is no  spinal canal stenosis or neural foraminal narrowing. It is somewhat  difficult to evaluate the facet joints due to susceptibility  artifact, noting this, bilateral facet osteoarthropathy has  decreased, likely sequela of prior facetectomy.      At L5-S1, there is no posterior disc contour abnormality. There is no  spinal canal stenosis or striking neural foraminal narrowing. Partial  visualization of left laminectomy. Bilateral facet osteoarthropathy  has decreased, possibly sequela of recent surgery.      IMPRESSION:  1. Postoperative changes from laminectomies and spinal fusion at  L4-L5 and L5-S1 with resolution of spinal canal stenosis L4-L5.      2. Otherwise, essentially stable multilevel degenerative changes as  detailed above.             XR L-spine 12/78/23:  Laminectomy posterior fusion L4 through S1. Kyphoplasty changes at  the L4 and S1 level. There is mild anterolisthesis L4-L5 and L5 on  S1, similar to the prior. The remainder of the lumbar spine is  unremarkable      IMPRESSION:  Postsurgical changes L4-S1 without evidence of hardware failure or  malalignment.         ASSESSMENT & PLAN  Alana Ashby is a 69 y.o. female with PMH HTN, GERD, obesity who presents for F/U LB pain     1) Lumbar PLS  -s/p L4-S1  fusion 2022 worsening since 2023 with subj RLE numbness  -Refractive to  >1 y conservative tx including Tylenol, NSAIDs, >6 w PT, gabapentin  -Saw Dr Clements 12/8 where no additional surgery was rec'd  -MRI L-spine 5/23/24: L4-S1 lami/fusion stable, multilevel spondylosis featuring L3-4 disc bulge/fissure contributing to mild stenosis  -Caudal CHRISTIANA 6/11/24: 85% relief, now diminishing  -[place] SCS trial 10/17/24: 60% relief. Improved standing, walking, activity tolerance. Tolerating chores better and improved QoL. Wants to move fwd with implant  -Schedule [place] SCS implant to target pain generator as seen on imaging and minimize risk/likelihood of chronic opioid use and/or surgery          Discussed procedure risks/benefits in detail with patient. Pt meets medical necessity for procedure due to failure of conservative measures. Reviewed procedural risks including bleeding, infection, nerve damage, paralysis. Also reviewed mitigating factors such as screening for infection/blood thinner use, sterile precautions, and image-guidance when applicable. All questions answered. Pt/guardian expressed understanding and choose to proceed                   Lj Delcid MD  Anesthesiologist & Interventional Pain Physician   [facility] [place]  O: [phone]  F: [phone]  3:27 PM  10/23/24

## 2024-11-06 ENCOUNTER — PREP FOR PROCEDURE (OUTPATIENT)
Dept: PAIN MEDICINE | Facility: CLINIC | Age: 69
End: 2024-11-06
Payer: MEDICARE

## 2024-11-06 DIAGNOSIS — M96.1 LUMBAR POST-LAMINECTOMY SYNDROME: Primary | ICD-10-CM

## 2024-11-06 RX ORDER — CEFAZOLIN SODIUM 2 G/100ML
2 INJECTION, SOLUTION INTRAVENOUS ONCE
OUTPATIENT
Start: 2024-11-06 | End: 2024-11-06

## 2024-11-18 ENCOUNTER — APPOINTMENT (OUTPATIENT)
Dept: PRIMARY CARE | Facility: CLINIC | Age: 69
End: 2024-11-18
Payer: MEDICARE

## 2024-11-21 ENCOUNTER — PRE-ADMISSION TESTING (OUTPATIENT)
Dept: PREADMISSION TESTING | Facility: HOSPITAL | Age: 69
End: 2024-11-21
Payer: MEDICARE

## 2024-11-21 VITALS
HEIGHT: 68 IN | DIASTOLIC BLOOD PRESSURE: 82 MMHG | BODY MASS INDEX: 33.95 KG/M2 | HEART RATE: 79 BPM | WEIGHT: 224 LBS | OXYGEN SATURATION: 98 % | TEMPERATURE: 97.2 F | SYSTOLIC BLOOD PRESSURE: 137 MMHG | RESPIRATION RATE: 16 BRPM

## 2024-11-21 DIAGNOSIS — Z01.818 PRE-OP TESTING: Primary | ICD-10-CM

## 2024-11-21 LAB
ANION GAP SERPL CALCULATED.3IONS-SCNC: 10 MMOL/L (ref 10–20)
BASOPHILS # BLD AUTO: 0.04 X10*3/UL (ref 0–0.1)
BASOPHILS NFR BLD AUTO: 0.5 %
BUN SERPL-MCNC: 17 MG/DL (ref 6–23)
CALCIUM SERPL-MCNC: 9.4 MG/DL (ref 8.6–10.3)
CHLORIDE SERPL-SCNC: 104 MMOL/L (ref 98–107)
CO2 SERPL-SCNC: 30 MMOL/L (ref 21–32)
CREAT SERPL-MCNC: 0.99 MG/DL (ref 0.5–1.05)
EGFRCR SERPLBLD CKD-EPI 2021: 62 ML/MIN/1.73M*2
EOSINOPHIL # BLD AUTO: 0.15 X10*3/UL (ref 0–0.7)
EOSINOPHIL NFR BLD AUTO: 1.9 %
ERYTHROCYTE [DISTWIDTH] IN BLOOD BY AUTOMATED COUNT: 12.4 % (ref 11.5–14.5)
GLUCOSE SERPL-MCNC: 83 MG/DL (ref 74–99)
HCT VFR BLD AUTO: 43.1 % (ref 36–46)
HGB BLD-MCNC: 14.3 G/DL (ref 12–16)
IMM GRANULOCYTES # BLD AUTO: 0.02 X10*3/UL (ref 0–0.7)
IMM GRANULOCYTES NFR BLD AUTO: 0.2 % (ref 0–0.9)
LYMPHOCYTES # BLD AUTO: 2.21 X10*3/UL (ref 1.2–4.8)
LYMPHOCYTES NFR BLD AUTO: 27.4 %
MCH RBC QN AUTO: 32 PG (ref 26–34)
MCHC RBC AUTO-ENTMCNC: 33.2 G/DL (ref 32–36)
MCV RBC AUTO: 96 FL (ref 80–100)
MONOCYTES # BLD AUTO: 0.63 X10*3/UL (ref 0.1–1)
MONOCYTES NFR BLD AUTO: 7.8 %
NEUTROPHILS # BLD AUTO: 5.02 X10*3/UL (ref 1.2–7.7)
NEUTROPHILS NFR BLD AUTO: 62.2 %
NRBC BLD-RTO: 0 /100 WBCS (ref 0–0)
PLATELET # BLD AUTO: 198 X10*3/UL (ref 150–450)
POTASSIUM SERPL-SCNC: 4.2 MMOL/L (ref 3.5–5.3)
RBC # BLD AUTO: 4.47 X10*6/UL (ref 4–5.2)
SODIUM SERPL-SCNC: 140 MMOL/L (ref 136–145)
WBC # BLD AUTO: 8.1 X10*3/UL (ref 4.4–11.3)

## 2024-11-21 PROCEDURE — 82374 ASSAY BLOOD CARBON DIOXIDE: CPT

## 2024-11-21 PROCEDURE — 85025 COMPLETE CBC W/AUTO DIFF WBC: CPT

## 2024-11-21 PROCEDURE — 99204 OFFICE O/P NEW MOD 45 MIN: CPT | Performed by: PHYSICIAN ASSISTANT

## 2024-11-21 PROCEDURE — 87081 CULTURE SCREEN ONLY: CPT | Mod: TRILAB

## 2024-11-21 PROCEDURE — 36415 COLL VENOUS BLD VENIPUNCTURE: CPT

## 2024-11-21 RX ORDER — CHLORHEXIDINE GLUCONATE ORAL RINSE 1.2 MG/ML
SOLUTION DENTAL
Qty: 473 ML | Refills: 0 | Status: SHIPPED | OUTPATIENT
Start: 2024-11-21 | End: 2024-12-03

## 2024-11-21 RX ORDER — CHOLECALCIFEROL (VITAMIN D3) 50 MCG
50 TABLET ORAL DAILY
COMMUNITY

## 2024-11-21 ASSESSMENT — DUKE ACTIVITY SCORE INDEX (DASI)
CAN YOU WALK A BLOCK OR TWO ON LEVEL GROUND: YES
CAN YOU CLIMB A FLIGHT OF STAIRS OR WALK UP A HILL: YES
CAN YOU HAVE SEXUAL RELATIONS: YES
CAN YOU PARTICIPATE IN STRENOUS SPORTS LIKE SWIMMING, SINGLES TENNIS, FOOTBALL, BASKETBALL, OR SKIING: NO
DASI METS SCORE: 5.7
CAN YOU WALK INDOORS, SUCH AS AROUND YOUR HOUSE: YES
CAN YOU DO HEAVY WORK AROUND THE HOUSE LIKE SCRUBBING FLOORS OR LIFTING AND MOVING HEAVY FURNITURE: NO
CAN YOU DO MODERATE WORK AROUND THE HOUSE LIKE VACUUMING, SWEEPING FLOORS OR CARRYING GROCERIES: YES
TOTAL_SCORE: 24.2
CAN YOU RUN A SHORT DISTANCE: NO
CAN YOU PARTICIPATE IN MODERATE RECREATIONAL ACTIVITIES LIKE GOLF, BOWLING, DANCING, DOUBLES TENNIS OR THROWING A BASEBALL OR FOOTBALL: NO
CAN YOU DO LIGHT WORK AROUND THE HOUSE LIKE DUSTING OR WASHING DISHES: YES
CAN YOU DO YARD WORK LIKE RAKING LEAVES, WEEDING OR PUSHING A MOWER: NO
CAN YOU TAKE CARE OF YOURSELF (EAT, DRESS, BATHE, OR USE TOILET): YES

## 2024-11-21 ASSESSMENT — ENCOUNTER SYMPTOMS
NEUROLOGICAL NEGATIVE: 1
CONSTITUTIONAL NEGATIVE: 1
RESPIRATORY NEGATIVE: 1
CARDIOVASCULAR NEGATIVE: 1
GASTROINTESTINAL NEGATIVE: 1
ARTHRALGIAS: 1
EYES NEGATIVE: 1
PSYCHIATRIC NEGATIVE: 1
NECK PAIN: 1
BACK PAIN: 1

## 2024-11-21 NOTE — PREPROCEDURE INSTRUCTIONS
Why must I stop eating and drinking near surgery time?  With sedation, food or liquid in your stomach can enter your lungs causing serious complications  Increases nausea and vomiting    When do I need to stop eating and drinking before my surgery?   Do not eat or drink after midnight the night before your surgery/procedure.  You may have small sips of water to take your medication.      PAT DISCHARGE INSTRUCTIONS    Please call the Same Day Surgery (SDS) Department of the hospital where your procedure will be performed after 2:00 PM the day before your surgery. If you are scheduled on a Monday, or a Tuesday following a Monday holiday, you will need to call on the last business day prior to your surgery.    [place] [id number] [place] [id number] [phone]  Second Floor      Please let your surgeon know if:      You develop any open sores, shingles, burning or painful urination as these may increase your risk of an infection.   You no longer wish to have the surgery.   Any other personal circumstances change that may lead to the need to cancel or defer this surgery-such as being sick or getting admitted to any hospital within one week of your planned procedure.    Your contact details change, such as a change of address or phone number.    Starting now:     Please DO NOT drink alcohol or smoke for 24 hours before surgery. It is well known that quitting smoking can make a huge difference to your health and recovery from surgery. The longer you abstain from smoking, the better your chances of a healthy recovery. If you need help with quitting, call 5-800-QUIT-NOW to be connected to a trained counselor who will discuss the best methods to help you quit.     Before your surgery:    Please stop all supplements 7 days prior to surgery. Or as directed by your surgeon.   Please stop taking NSAID pain medicine such as Advil and Motrin 7 days before surgery.    If you  develop any fever, cough, cold, rashes, cuts, scratches, scrapes, urinary symptoms or infection anywhere on your body (including teeth and gums) prior to surgery, please call your surgeon’s office as soon as possible. This may require treatment to reduce the chance of cancellation on the day of surgery.    The day before your surgery:   DIET- Please follow the diet instructions at the top of your packet.   Get a good night’s rest.  Use the special soap for bathing if you have been instructed to use one.    Scheduled surgery times may change and you will be notified if this occurs - please check your personal voicemail for any updates.     On the morning of surgery:   Wear comfortable, loose fitting clothes which open in the front. Please do not wear moisturizers, creams, lotions, makeup or perfume.    Please bring with you to surgery:   Photo ID and insurance card   Current list of medicines and allergies   Pacemaker/ Defibrillator/Heart stent cards   CPAP machine and mask    Slings/ splints/ crutches   A copy of your complete advanced directive/DHPOA.    Please do NOT bring with you to surgery:   All jewelry and valuables should be left at home.   Prosthetic devices such as contact lenses, hearing aids, dentures, eyelash extensions, hairpins and body piercings must be removed prior to going in to the surgical suite.    After outpatient surgery:   A responsible adult MUST accompany you at the time of discharge and stay with you for 24 hours after your surgery. You may NOT drive yourself home after surgery.    Do not drive, operate machinery, make critical decisions or do activities that require co-ordination or balance until after a night’s sleep.   Do not drink alcoholic beverages for 24 hours.   Instructions for resuming your medications will be provided by your surgeon.    CALL YOUR DOCTOR AFTER SURGERY IF YOU HAVE:     Chills and/or a fever of 101° F or higher.    Redness, swelling, pus or drainage from your  surgical wound or a bad smell from the wound.    Lightheadedness, fainting or confusion.    Persistent vomiting (throwing up) and are not able to eat or drink for 12 hours.    Three or more loose, watery bowel movements in 24 hours (diarrhea).   Difficulty or pain while urinating( after non-urological surgery)    Pain and swelling in your legs, especially if it is only on one side.    Difficulty breathing or are breathing faster than normal.    Any new concerning symptoms.        Patient Information: Pre-Operative Infection Prevention Measures     Why did I have my nose, under my arms, and groin swabbed?  The purpose of the swab is to identify Staphylococcus aureus inside your nose or on your skin.  The swab was sent to the laboratory for culture.  A positive swab/culture for Staphylococcus aureus is called colonization or carriage.      What is Staphylococcus aureus?  Staphylococcus aureus, also known as “staph”, is a germ found on the skin or in the nose of healthy people.  Sometimes Staphylococcus aureus can get into the body and cause an infection.  This can be minor (such as pimples, boils, or other skin problems).  It might also be serious (such as a blood infection, pneumonia, or a surgical site infection).    What is Staphylococcus aureus colonization or carriage?  Colonization or carriage means that a person has the germ but is not sick from it.  These bacteria can be spread on the hands or when breathing or sneezing.    How is Staphylococcus aureus spread?  It is most often spread by close contact with a person or item that carries it.    What happens if my culture is positive for Staphylococcus aureus?  Your doctor/medical team will use this information to guide any antibiotic treatment which may be necessary.  Regardless of the culture results, we will clean the inside of your nose with a betadine swab just before you have your surgery.      Will I get an infection if I have Staphylococcus aureus in my  nose or on my skin?  Anyone can get an infection with Staphylococcus aureus.  However, the best way to reduce your risk of infection is to follow the instructions provided to you for the use of your CHG soap and dental rinse.        Patient Information: Oral/Dental Rinse    What is oral/dental rinse?   It is a mouthwash. It is a way of cleaning the mouth with a germ-killing solution before your surgery.  The solution contains chlorhexidine, commonly known as CHG.   It is used inside the mouth to kill a bacteria known as Staphylococcus aureus.  Let your doctor know if you are allergic to Chlorhexidine.    Why do I need to use CHG oral/dental rinse?  The CHG oral/dental rinse helps to kill a bacteria in your mouth known as Staphylococcus aureus.     This reduces the risk of infection at the surgical site.      Using your CHG oral/dental rinse  STEPS:  Use your CHG oral/dental rinse after you brush your teeth the night before (at bedtime) and the morning of your surgery.  Follow all directions on your prescription label.    Use the cap on the container to measure 15ml   Swish (gargle if you can) the mouthwash in your mouth for at least 30 seconds, (do not swallow) and spit out  After you use your CHG rinse, do not rinse your mouth with water, drink or eat.  Please refer to the prescription label for the appropriate time to resume oral intake      What side effects might I have using the CHG oral/dental rinse?  CHG rinse will stick to plaque on the teeth.  Brush and floss just before use.  Teeth brushing will help avoid staining of plaque during use.      Patient Information: Home Preoperative Antibacterial Shower      What is a home preoperative antibacterial shower?  This shower is a way of cleaning the skin with a germ-killing solution before surgery.  The solution contains chlorhexidine, commonly known as CHG.  CHG is a skin cleanser with germ-killing ability.  Let your doctor know if you are allergic to  chlorhexidine.    Why do I need to take a preoperative antibacterial shower?  Skin is not sterile.  It is best to try to make your skin as free of germs as possible before surgery.  Proper cleansing with a germ-killing soap before surgery can lower the number of germs on your skin.  This helps to reduce the risk of infection at the surgical site.  Following the instructions listed below will help you prepare your skin for surgery.      How do I use the solution?  Steps:  Begin using your CHG soap 5 days before your scheduled surgery on ________________________.    First, wash and rinse your hair using the CHG soap. Keep CHG soap away from ear canals and eyes.  Rinse completely, do not condition.  Hair extensions should be removed.  Wash your face with your normal soap and rinse.    Apply the CHG solution to a clean wet washcloth.  Turn the water off or move away from the water spray to avoid premature rinsing of the CHG soap as you are applying.   Firmly lather your entire body from the neck down.  Do not use on your face.  Pay special attention to the area(s) where your incision(s) will be located unless they are on your face.  Avoid scrubbing your skin too hard.  The important point is to have the CHG soap sit on your skin for 3 minutes.    When the 3 minutes are up, turn on the water and rinse the CHG solution off your body completely.   DO NOT wash with regular soap after you have used the CHG soap solution  Pat yourself dry with a clean, freshly-laundered towel.  DO NOT apply powders, deodorants, or lotions.  Dress in clean, freshly laundered nightclothes.    Be sure to sleep with clean, freshly laundered sheets.  Be aware that CHG will cause stains on fabrics; if you wash them with bleach after use.  Rinse your washcloth and other linens that have contact with CHG completely.  Use only non-chlorine detergents to launder the items used.   The morning of surgery is the fifth day.  Repeat the above steps and  dress in clean comfortable clothing     Whom should I contact if I have any questions regarding the use of CHG soap?  Call the [facility] at [phone] if you have any questions.                    Medication List            Accurate as of November 21, 2024  1:00 PM. Always use your most recent med list.                atorvastatin 20 mg tablet  Commonly known as: Lipitor  Take 1 tablet (20 mg) by mouth once daily. as directed  Medication Adjustments for Surgery: Take on the morning of surgery     b complex 0.4 mg tablet  Additional Medication Adjustments for Surgery: Take last dose 7 days before surgery     BENEFIBER CLEAR SF (DEXTRIN) ORAL  Medication Adjustments for Surgery: Do Not take on the morning of surgery     cholecalciferol 50 MCG (2000 UT) tablet  Commonly known as: Vitamin D-3  Additional Medication Adjustments for Surgery: Take last dose 7 days before surgery     ibuprofen 200 mg tablet  Additional Medication Adjustments for Surgery: Take last dose 7 days before surgery     levothyroxine 100 mcg tablet  Commonly known as: Synthroid, Levoxyl  Take 1 tablet (100 mcg) by mouth early in the morning..  Medication Adjustments for Surgery: Take on the morning of surgery     lisinopril 20 mg tablet  Take 1 tablet (20 mg) by mouth once daily. as directed  Medication Adjustments for Surgery: Take last dose 1 day (24 hours) before surgery     magnesium oxide 400 mg tablet  Commonly known as: Mag-Ox  Medication Adjustments for Surgery: Do Not take on the morning of surgery     omeprazole 40 mg DR capsule  Commonly known as: PriLOSEC  Take 1 capsule (40 mg) by mouth once daily in the morning. Take before meals.  Medication Adjustments for Surgery: Take on the morning of surgery     Pain Reliever (acetaminophen) 500 mg tablet  Generic drug: acetaminophen  Medication Adjustments for Surgery: Take/Use as prescribed

## 2024-11-21 NOTE — CPM/PAT H&P
"CPM/PAT Evaluation       Name: Alana Ashby (Alana Ashby)  /Age: 1955/69 y.o.     In-Person       Chief Complaint: \"back pain\"    HPI  The patient is a 69 year old female with a history of cervical and lumbar pain.  In  she underwent a lumbar spine surgery.  In the initial post operative phase she had slight improvement of her back pain, but after a few months the pain returned.  She has non-radiating lumbar spine pain with weight bearing activities.  She denies associated leg weakness, instability or falls.  She does have intermittent numbness of the right anterior thigh since her surgery.  She has been followed by Dr. Delcid and steroid injections have only given her minimal relief.  She had a trial spinal cord stimulator in  and this was successful at relieving her pain.  She is now scheduled to have insertion of a permanent spinal cord stimulator on 12/3/2024.    Past Medical History:   Diagnosis Date    Abdominal mass 2023    Arthritis ?    Chronic pain disorder 1970?    Colon polyp ?2017    Diverticulitis of colon ?2017    Diverticulosis ?    Elbow pain 2023    Encounter for screening for malignant neoplasm of colon 2014    Encounter for screening colonoscopy    Endometriosis     Epigastric pain 2023    Fibrocystic breast ?2020    GERD (gastroesophageal reflux disease) ?    Hiatal hernia ?    Hyperlipidemia     Hypertension ?    Knee pain 2023    Low back pain 1970 ?    Neck pain 2019 ?    Other specified diseases of gallbladder 2014    Biliary dyskinesia    Personal history of other diseases of the digestive system     History of esophageal reflux    Personal history of other specified conditions 2014    History of nausea    PONV (postoperative nausea and vomiting) ?    Also as recent as 2022    Right upper quadrant pain 07/10/2014    Abdominal pain, RUQ    Thumb pain 2023    Thyroid cancer (Multi) " O7/2024    Thyroid nodule 6/2024    Vitamin D deficiency 2014?       Past Surgical History:   Procedure Laterality Date    CHOLECYSTECTOMY  2014    COLONOSCOPY  2021    COLONOSCOPY W/ POLYPECTOMY  2014    ESOPHAGOGASTRODUODENOSCOPY  2014    HYSTERECTOMY  2013    LUMBAR FUSION  2022    L4, L5, and S1    MR HEAD ANGIO WO IV CONTRAST  04/24/2015    MR HEAD ANGIO WO IV CONTRAST 4/24/2015 GEN ANCILLARY LEGACY    MR NECK ANGIO WO IV CONTRAST  04/24/2015    MR NECK ANGIO WO IV CONTRAST 4/24/2015 GEN ANCILLARY LEGACY    OTHER SURGICAL HISTORY  2024    Insertion spinal cord stimulator trial    THYROIDECTOMY Bilateral 08/21/2024    UPPER GASTROINTESTINAL ENDOSCOPY  1990    WISDOM TOOTH EXTRACTION  1971     Family History   Problem Relation Name Age of Onset    Cancer Mother Janet Young     Hypertension Mother Janet Young     Anesthesia problems Mother Janet Young     Anesthesia related problems Mother Janet Young     Diabetes Father Jim Young     Stroke Father Jim Young      Social History     Tobacco Use    Smoking status: Never     Passive exposure: Never    Smokeless tobacco: Never   Substance Use Topics    Alcohol use: Never     Social History     Substance and Sexual Activity   Drug Use Never       Allergies   Allergen Reactions    Baclofen Unknown    Gabapentin Other     Memory loss     Current Outpatient Medications   Medication Sig Dispense Refill    atorvastatin (Lipitor) 20 mg tablet Take 1 tablet (20 mg) by mouth once daily. as directed 90 tablet 3    b complex 0.4 mg tablet Take 1 tablet by mouth once daily.      chlorhexidine (Peridex) 0.12 % solution Use as directed. 473 mL 0    cholecalciferol (Vitamin D-3) 50 MCG (2000 UT) tablet Take 1 tablet (50 mcg) by mouth once daily.      ibuprofen 200 mg tablet Take by mouth every 6 hours if needed for mild pain (1 - 3).      levothyroxine (Synthroid, Levoxyl) 100 mcg tablet Take 1 tablet (100 mcg) by mouth early in the morning.. 90 tablet 3    lisinopril 20  "mg tablet Take 1 tablet (20 mg) by mouth once daily. as directed 90 tablet 3    magnesium oxide (Mag-Ox) 400 mg tablet 1 tablet (400 mg) once daily.      omeprazole (PriLOSEC) 40 mg DR capsule Take 1 capsule (40 mg) by mouth once daily in the morning. Take before meals. 90 capsule 3    Pain Reliever, acetaminophen, 500 mg tablet TAKE 1 TABLET BY MOUTH EVERY 4 TO 6 HOURS AS NEEDED      wheat dextrin (BENEFIBER CLEAR SF, DEXTRIN, ORAL) Take by mouth.       No current facility-administered medications for this visit.     Review of Systems   Constitutional: Negative.    HENT: Negative.     Eyes: Negative.    Respiratory: Negative.     Cardiovascular: Negative.    Gastrointestinal: Negative.    Genitourinary: Negative.    Musculoskeletal:  Positive for arthralgias, back pain and neck pain.   Neurological: Negative.    Psychiatric/Behavioral: Negative.       /82   Pulse 79   Temp 36.2 °C (97.2 °F) (Temporal)   Resp 16   Ht 1.727 m (5' 8\")   Wt 102 kg (224 lb)   SpO2 98%   BMI 34.06 kg/m²     Physical Exam  Vitals reviewed.   Constitutional:       Comments: Overweight     HENT:      Head: Normocephalic and atraumatic.      Mouth/Throat:      Mouth: Mucous membranes are moist.      Pharynx: Oropharynx is clear.   Eyes:      Extraocular Movements: Extraocular movements intact.      Pupils: Pupils are equal, round, and reactive to light.   Cardiovascular:      Rate and Rhythm: Normal rate and regular rhythm.      Heart sounds: Normal heart sounds.   Pulmonary:      Effort: Pulmonary effort is normal.      Breath sounds: Normal breath sounds.   Abdominal:      General: Bowel sounds are normal.      Palpations: Abdomen is soft.      Tenderness: There is no abdominal tenderness.   Musculoskeletal:         General: No swelling.      Comments: Lumbar spine range of motion deferred due to pain.   Skin:     General: Skin is warm and dry.   Neurological:      General: No focal deficit present.      Mental Status: She is " alert and oriented to person, place, and time.   Psychiatric:         Mood and Affect: Mood normal.         Behavior: Behavior normal.        PAT AIRWAY:   Airway:     Mallampati::  II    TM distance::  >3 FB    Neck ROM::  Full   Teeth intact    ASA:  II  DASI SCORE:  24.2  METS SCORE:  5.7  CHAD2 SCORE:  2.8%  REVISED CARDIAC RISK INDEX:  0.4%  STOP BANG SCORE:  3  CAPRINI DVT SCORE:  6  VALERIE SCORE:  0.18%  ARISCAT SCORE:  1.6%    EKG 7/25/2024  CBC, BMP, MRSA ordered during PAT visit    Assessment and Plan:     Lumbar post-laminectomy syndrome:  Insertion spinal cord stimulator  Hypertension - taking lisinopril  Papillary thyroid cancer - s/p total thyroidectomy 8/2024 - taking levothyroxine  Post-operative nausea and vomiting    Digna Hernandez PA-C

## 2024-11-23 LAB — STAPHYLOCOCCUS SPEC CULT: NORMAL

## 2024-12-03 ENCOUNTER — ANESTHESIA (OUTPATIENT)
Dept: OPERATING ROOM | Facility: HOSPITAL | Age: 69
End: 2024-12-03
Payer: MEDICARE

## 2024-12-03 ENCOUNTER — HOSPITAL ENCOUNTER (OUTPATIENT)
Facility: HOSPITAL | Age: 69
Setting detail: OUTPATIENT SURGERY
Discharge: HOME | End: 2024-12-03
Attending: ANESTHESIOLOGY | Admitting: ANESTHESIOLOGY
Payer: MEDICARE

## 2024-12-03 ENCOUNTER — APPOINTMENT (OUTPATIENT)
Dept: RADIOLOGY | Facility: HOSPITAL | Age: 69
End: 2024-12-03
Payer: MEDICARE

## 2024-12-03 ENCOUNTER — ANESTHESIA EVENT (OUTPATIENT)
Dept: OPERATING ROOM | Facility: HOSPITAL | Age: 69
End: 2024-12-03
Payer: MEDICARE

## 2024-12-03 ENCOUNTER — PHARMACY VISIT (OUTPATIENT)
Dept: PHARMACY | Facility: CLINIC | Age: 69
End: 2024-12-03
Payer: COMMERCIAL

## 2024-12-03 VITALS
DIASTOLIC BLOOD PRESSURE: 78 MMHG | OXYGEN SATURATION: 96 % | HEIGHT: 68 IN | BODY MASS INDEX: 33.95 KG/M2 | HEART RATE: 72 BPM | TEMPERATURE: 96.8 F | WEIGHT: 224 LBS | SYSTOLIC BLOOD PRESSURE: 132 MMHG | RESPIRATION RATE: 20 BRPM

## 2024-12-03 DIAGNOSIS — Z79.2 NEED FOR ANTIBIOTIC PROPHYLAXIS FOR SURGICAL PROCEDURE: ICD-10-CM

## 2024-12-03 DIAGNOSIS — M96.1 LUMBAR POST-LAMINECTOMY SYNDROME: Primary | ICD-10-CM

## 2024-12-03 DIAGNOSIS — G89.18 POSTOPERATIVE PAIN: ICD-10-CM

## 2024-12-03 PROBLEM — E66.9 OBESITY: Status: ACTIVE | Noted: 2024-12-03

## 2024-12-03 PROBLEM — M15.9 OSTEOARTHRITIS OF MULTIPLE JOINTS: Status: ACTIVE | Noted: 2024-12-03

## 2024-12-03 PROBLEM — E89.0 POSTOPERATIVE HYPOTHYROIDISM: Status: ACTIVE | Noted: 2024-12-03

## 2024-12-03 PROBLEM — G89.29 CHRONIC LOW BACK PAIN: Status: ACTIVE | Noted: 2024-12-03

## 2024-12-03 PROBLEM — M54.50 CHRONIC LOW BACK PAIN: Status: ACTIVE | Noted: 2024-12-03

## 2024-12-03 PROCEDURE — 2500000005 HC RX 250 GENERAL PHARMACY W/O HCPCS: Performed by: ANESTHESIOLOGY

## 2024-12-03 PROCEDURE — 63650 IMPLANT NEUROELECTRODES: CPT | Performed by: ANESTHESIOLOGY

## 2024-12-03 PROCEDURE — C1820 GENERATOR NEURO RECHG BAT SY: HCPCS | Performed by: ANESTHESIOLOGY

## 2024-12-03 PROCEDURE — 95972 ALYS CPLX SP/PN NPGT W/PRGRM: CPT | Performed by: ANESTHESIOLOGY

## 2024-12-03 PROCEDURE — 2780000003 HC OR 278 NO HCPCS: Performed by: ANESTHESIOLOGY

## 2024-12-03 PROCEDURE — 63685 INS/RPLC SPI NPG/RCVR POCKET: CPT | Performed by: ANESTHESIOLOGY

## 2024-12-03 PROCEDURE — 3700000001 HC GENERAL ANESTHESIA TIME - INITIAL BASE CHARGE: Performed by: ANESTHESIOLOGY

## 2024-12-03 PROCEDURE — 2720000007 HC OR 272 NO HCPCS: Performed by: ANESTHESIOLOGY

## 2024-12-03 PROCEDURE — 7100000009 HC PHASE TWO TIME - INITIAL BASE CHARGE: Performed by: ANESTHESIOLOGY

## 2024-12-03 PROCEDURE — RXMED WILLOW AMBULATORY MEDICATION CHARGE

## 2024-12-03 PROCEDURE — 2500000004 HC RX 250 GENERAL PHARMACY W/ HCPCS (ALT 636 FOR OP/ED): Performed by: ANESTHESIOLOGY

## 2024-12-03 PROCEDURE — 2500000004 HC RX 250 GENERAL PHARMACY W/ HCPCS (ALT 636 FOR OP/ED): Performed by: NURSE ANESTHETIST, CERTIFIED REGISTERED

## 2024-12-03 PROCEDURE — C1889 IMPLANT/INSERT DEVICE, NOC: HCPCS | Performed by: ANESTHESIOLOGY

## 2024-12-03 PROCEDURE — 7100000010 HC PHASE TWO TIME - EACH INCREMENTAL 1 MINUTE: Performed by: ANESTHESIOLOGY

## 2024-12-03 PROCEDURE — A63650 PR PERCUT IMPLNT NEUROELECT,EPIDURAL: Performed by: NURSE ANESTHETIST, CERTIFIED REGISTERED

## 2024-12-03 PROCEDURE — 7100000002 HC RECOVERY ROOM TIME - EACH INCREMENTAL 1 MINUTE: Performed by: ANESTHESIOLOGY

## 2024-12-03 PROCEDURE — 7100000001 HC RECOVERY ROOM TIME - INITIAL BASE CHARGE: Performed by: ANESTHESIOLOGY

## 2024-12-03 PROCEDURE — 3600000009 HC OR TIME - EACH INCREMENTAL 1 MINUTE - PROCEDURE LEVEL FOUR: Performed by: ANESTHESIOLOGY

## 2024-12-03 PROCEDURE — 3600000004 HC OR TIME - INITIAL BASE CHARGE - PROCEDURE LEVEL FOUR: Performed by: ANESTHESIOLOGY

## 2024-12-03 PROCEDURE — A63650 PR PERCUT IMPLNT NEUROELECT,EPIDURAL: Performed by: ANESTHESIOLOGY

## 2024-12-03 PROCEDURE — C1778 LEAD, NEUROSTIMULATOR: HCPCS | Performed by: ANESTHESIOLOGY

## 2024-12-03 PROCEDURE — 3700000002 HC GENERAL ANESTHESIA TIME - EACH INCREMENTAL 1 MINUTE: Performed by: ANESTHESIOLOGY

## 2024-12-03 PROCEDURE — 2500000004 HC RX 250 GENERAL PHARMACY W/ HCPCS (ALT 636 FOR OP/ED): Mod: JZ | Performed by: ANESTHESIOLOGY

## 2024-12-03 DEVICE — GENERATOR KIT, WAVEWRITER ALPHA,  32 IPG: Type: IMPLANTABLE DEVICE | Site: SPINE LUMBAR | Status: FUNCTIONAL

## 2024-12-03 DEVICE — 50CM 8 CONTACT LEAD KIT
Type: IMPLANTABLE DEVICE | Site: BACK | Status: FUNCTIONAL
Brand: LINEAR™ ST

## 2024-12-03 DEVICE — ANCHOR KIT, CLIK X: Type: IMPLANTABLE DEVICE | Site: BACK | Status: FUNCTIONAL

## 2024-12-03 RX ORDER — ONDANSETRON HYDROCHLORIDE 2 MG/ML
4 INJECTION, SOLUTION INTRAVENOUS ONCE AS NEEDED
Status: DISCONTINUED | OUTPATIENT
Start: 2024-12-03 | End: 2024-12-03 | Stop reason: HOSPADM

## 2024-12-03 RX ORDER — FENTANYL CITRATE 50 UG/ML
INJECTION, SOLUTION INTRAMUSCULAR; INTRAVENOUS AS NEEDED
Status: DISCONTINUED | OUTPATIENT
Start: 2024-12-03 | End: 2024-12-03

## 2024-12-03 RX ORDER — ONDANSETRON HYDROCHLORIDE 2 MG/ML
INJECTION, SOLUTION INTRAVENOUS AS NEEDED
Status: DISCONTINUED | OUTPATIENT
Start: 2024-12-03 | End: 2024-12-03

## 2024-12-03 RX ORDER — ALBUTEROL SULFATE 0.83 MG/ML
2.5 SOLUTION RESPIRATORY (INHALATION) ONCE AS NEEDED
Status: DISCONTINUED | OUTPATIENT
Start: 2024-12-03 | End: 2024-12-03 | Stop reason: HOSPADM

## 2024-12-03 RX ORDER — CEFAZOLIN SODIUM 2 G/100ML
2 INJECTION, SOLUTION INTRAVENOUS ONCE
Status: COMPLETED | OUTPATIENT
Start: 2024-12-03 | End: 2024-12-03

## 2024-12-03 RX ORDER — HYDROMORPHONE HYDROCHLORIDE 2 MG/ML
0.2 INJECTION, SOLUTION INTRAMUSCULAR; INTRAVENOUS; SUBCUTANEOUS EVERY 5 MIN PRN
Status: DISCONTINUED | OUTPATIENT
Start: 2024-12-03 | End: 2024-12-03 | Stop reason: HOSPADM

## 2024-12-03 RX ORDER — LIDOCAINE HYDROCHLORIDE 10 MG/ML
0.1 INJECTION, SOLUTION INFILTRATION; PERINEURAL ONCE
Status: DISCONTINUED | OUTPATIENT
Start: 2024-12-03 | End: 2024-12-03 | Stop reason: HOSPADM

## 2024-12-03 RX ORDER — HYDRALAZINE HYDROCHLORIDE 20 MG/ML
5 INJECTION INTRAMUSCULAR; INTRAVENOUS EVERY 30 MIN PRN
Status: DISCONTINUED | OUTPATIENT
Start: 2024-12-03 | End: 2024-12-03 | Stop reason: HOSPADM

## 2024-12-03 RX ORDER — HYDROMORPHONE HYDROCHLORIDE 0.2 MG/ML
0.1 INJECTION INTRAMUSCULAR; INTRAVENOUS; SUBCUTANEOUS EVERY 5 MIN PRN
Status: DISCONTINUED | OUTPATIENT
Start: 2024-12-03 | End: 2024-12-03 | Stop reason: HOSPADM

## 2024-12-03 RX ORDER — HYDROCODONE BITARTRATE AND ACETAMINOPHEN 5; 325 MG/1; MG/1
1 TABLET ORAL 4 TIMES DAILY PRN
Qty: 12 TABLET | Refills: 0 | Status: SHIPPED | OUTPATIENT
Start: 2024-12-03 | End: 2024-12-06

## 2024-12-03 RX ORDER — PROPOFOL 10 MG/ML
INJECTION, EMULSION INTRAVENOUS AS NEEDED
Status: DISCONTINUED | OUTPATIENT
Start: 2024-12-03 | End: 2024-12-03

## 2024-12-03 RX ORDER — SODIUM CHLORIDE, SODIUM LACTATE, POTASSIUM CHLORIDE, CALCIUM CHLORIDE 600; 310; 30; 20 MG/100ML; MG/100ML; MG/100ML; MG/100ML
100 INJECTION, SOLUTION INTRAVENOUS CONTINUOUS
Status: DISCONTINUED | OUTPATIENT
Start: 2024-12-03 | End: 2024-12-03 | Stop reason: HOSPADM

## 2024-12-03 RX ORDER — BUPIVACAINE HYDROCHLORIDE 2.5 MG/ML
INJECTION, SOLUTION EPIDURAL; INFILTRATION; INTRACAUDAL AS NEEDED
Status: DISCONTINUED | OUTPATIENT
Start: 2024-12-03 | End: 2024-12-03 | Stop reason: HOSPADM

## 2024-12-03 RX ORDER — HYDROMORPHONE HYDROCHLORIDE 2 MG/ML
0.5 INJECTION, SOLUTION INTRAMUSCULAR; INTRAVENOUS; SUBCUTANEOUS EVERY 5 MIN PRN
Status: DISCONTINUED | OUTPATIENT
Start: 2024-12-03 | End: 2024-12-03 | Stop reason: HOSPADM

## 2024-12-03 RX ORDER — MIDAZOLAM HYDROCHLORIDE 1 MG/ML
INJECTION, SOLUTION INTRAMUSCULAR; INTRAVENOUS AS NEEDED
Status: DISCONTINUED | OUTPATIENT
Start: 2024-12-03 | End: 2024-12-03

## 2024-12-03 RX ORDER — CEPHALEXIN 500 MG/1
500 CAPSULE ORAL 2 TIMES DAILY
Qty: 10 CAPSULE | Refills: 0 | Status: SHIPPED | OUTPATIENT
Start: 2024-12-03 | End: 2024-12-08

## 2024-12-03 SDOH — HEALTH STABILITY: MENTAL HEALTH: CURRENT SMOKER: 0

## 2024-12-03 ASSESSMENT — ENCOUNTER SYMPTOMS
ENDOCRINE NEGATIVE: 1
HEMATOLOGIC/LYMPHATIC NEGATIVE: 1
BACK PAIN: 1
CONSTITUTIONAL NEGATIVE: 1
RESPIRATORY NEGATIVE: 1
GASTROINTESTINAL NEGATIVE: 1
EYES NEGATIVE: 1
CARDIOVASCULAR NEGATIVE: 1
PSYCHIATRIC NEGATIVE: 1
NUMBNESS: 1

## 2024-12-03 ASSESSMENT — PAIN - FUNCTIONAL ASSESSMENT
PAIN_FUNCTIONAL_ASSESSMENT: 0-10

## 2024-12-03 ASSESSMENT — PAIN DESCRIPTION - DESCRIPTORS
DESCRIPTORS: DULL
DESCRIPTORS: ACHING
DESCRIPTORS: ACHING
DESCRIPTORS: SORE

## 2024-12-03 ASSESSMENT — PAIN SCALES - GENERAL
PAINLEVEL_OUTOF10: 2
PAINLEVEL_OUTOF10: 1
PAINLEVEL_OUTOF10: 4
PAINLEVEL_OUTOF10: 3
PAINLEVEL_OUTOF10: 0 - NO PAIN

## 2024-12-03 ASSESSMENT — COLUMBIA-SUICIDE SEVERITY RATING SCALE - C-SSRS
1. IN THE PAST MONTH, HAVE YOU WISHED YOU WERE DEAD OR WISHED YOU COULD GO TO SLEEP AND NOT WAKE UP?: NO
6. HAVE YOU EVER DONE ANYTHING, STARTED TO DO ANYTHING, OR PREPARED TO DO ANYTHING TO END YOUR LIFE?: NO
2. HAVE YOU ACTUALLY HAD ANY THOUGHTS OF KILLING YOURSELF?: NO

## 2024-12-03 NOTE — ANESTHESIA POSTPROCEDURE EVALUATION
Patient: Alana Ashby    Procedure Summary       Date: 12/03/24 Room / Location: [facility] 02 / [facility]    Anesthesia Start: 1128 Anesthesia Stop: 1317    Procedure: Spinal Cord Stimulator Implant Diagnosis:       Lumbar post-laminectomy syndrome      (Lumbar post-laminectomy syndrome [M96.1])    Surgeons: Lj Delcid MD Responsible Provider: Nate Antony MD    Anesthesia Type: MAC ASA Status: 3            Anesthesia Type: MAC    Vitals Value Taken Time   /84 12/03/24 1350   Temp 36 °C (96.8 °F) 12/03/24 1315   Pulse 75 12/03/24 1350   Resp 11 12/03/24 1350   SpO2 94 % 12/03/24 1351   Vitals shown include unfiled device data.    Anesthesia Post Evaluation    Patient location during evaluation: PACU  Patient participation: complete - patient participated  Level of consciousness: awake and alert  Pain management: adequate  Airway patency: patent  Cardiovascular status: acceptable  Respiratory status: acceptable  Hydration status: acceptable  Postoperative Nausea and Vomiting: none        There were no known notable events for this encounter.    
Home

## 2024-12-03 NOTE — POST-PROCEDURE NOTE
Pt arrived to [facility] via cart  spouse in room with pt.  Cola  and cookies taken well. Ice bag sent home with pt  1445  up to bathroom to void.  No problems

## 2024-12-03 NOTE — ANESTHESIA PREPROCEDURE EVALUATION
Patient: Alana Ashby    Procedure Information       Date/Time: 12/03/24 1000    Procedure: Spinal Cord Stimulator Implant - C- ARM, [place] - [place]    Location: [facility] / [facility]    Surgeons: Lj Delcid MD          Past Medical History:   Diagnosis Date    Abdominal mass 06/30/2023    Arthritis 1980?    Chronic pain disorder 1970?    Colon polyp ?2017    Diverticulitis of colon ?2017    Diverticulosis 2017?    Elbow pain 06/30/2023    Encounter for screening for malignant neoplasm of colon 03/26/2014    Encounter for screening colonoscopy    Endometriosis 1991    Epigastric pain 06/30/2023    Fibrocystic breast ?2020    GERD (gastroesophageal reflux disease) 1990?    Hiatal hernia 2014?    Hyperlipidemia     Hypertension 1990?    Knee pain 06/30/2023    Low back pain 1970 ?    Neck pain 2019 ?    Other specified diseases of gallbladder 06/26/2014    Biliary dyskinesia    Personal history of other diseases of the digestive system     History of esophageal reflux    Personal history of other specified conditions 06/04/2014    History of nausea    PONV (postoperative nausea and vomiting) ?1991    Also as recent as 12/22/2022    Right upper quadrant pain 07/10/2014    Abdominal pain, RUQ    Thumb pain 06/30/2023    Thyroid cancer (Multi) O7/2024    Thyroid nodule 6/2024    Vitamin D deficiency 2014?      Relevant Problems   Cardiac   (+) HTN (hypertension)   (+) Hyperlipidemia      Neuro   (+) Anxiety   (+) Cervical radiculitis      GI   (+) GERD (gastroesophageal reflux disease)   (+) Hiatal hernia   (+) Pharyngoesophageal dysphagia      Endocrine   (+) Multiple thyroid nodules   (+) Obesity   (+) Postoperative hypothyroidism   (+) Primary papillary carcinoma of thyroid gland (Multi)   (+) Thyroid cancer (Multi)      Musculoskeletal   (+) Chronic low back pain   (+) Osteoarthritis of multiple joints     Past Surgical History:   Procedure Laterality Date    CHOLECYSTECTOMY  2014    COLONOSCOPY  2021     COLONOSCOPY W/ POLYPECTOMY  2014    ESOPHAGOGASTRODUODENOSCOPY  2014    HYSTERECTOMY  2013    LUMBAR FUSION  2022    L4, L5, and S1    MR HEAD ANGIO WO IV CONTRAST  04/24/2015    MR HEAD ANGIO WO IV CONTRAST 4/24/2015 GEN ANCILLARY LEGACY    MR NECK ANGIO WO IV CONTRAST  04/24/2015    MR NECK ANGIO WO IV CONTRAST 4/24/2015 GEN ANCILLARY LEGACY    OTHER SURGICAL HISTORY  2024    Insertion spinal cord stimulator trial    THYROIDECTOMY Bilateral 08/21/2024    UPPER GASTROINTESTINAL ENDOSCOPY  1990    WISDOM TOOTH EXTRACTION  1971        Clinical information reviewed:   Tobacco  Allergies  Meds   Med Hx  Surg Hx  OB Status  Fam Hx  Soc   Hx        NPO Detail:  NPO/Void Status  Carbohydrate Drink Given Prior to Surgery? : N  Date of Last Liquid: 12/03/24  Time of Last Liquid: 0400 (sips with meds)  Date of Last Solid: 12/02/24  Time of Last Solid: 2000  Last Intake Type: Clear fluids  Time of Last Void: 0839         Physical Exam    Airway  Mallampati: III  TM distance: >3 FB  Neck ROM: full     Cardiovascular    Dental    Pulmonary    Abdominal            Anesthesia Plan    History of general anesthesia?: yes  History of complications of general anesthesia?: no    ASA 3     MAC     The patient is not a current smoker.  Patient was not previously instructed to abstain from smoking on day of procedure.  Patient did not smoke on day of procedure.    intravenous induction   Postoperative administration of opioids is intended.  Anesthetic plan and risks discussed with patient.    Plan discussed with attending.

## 2024-12-03 NOTE — DISCHARGE INSTRUCTIONS
DISCHARGE INSTRUCTIONS FOR SPINAL CORD STIMULATION IMPLANT     You received a spinal cord stimulator today.     -    Okay to reinforce dressing if needed.   -    Do not shower for 2 days. Do not soak/hot tub/jacuzzi/pool for 2 weeks to prevent infection   -    Do not bend at the waist, twist and lift your arms above your shoulders for 6 weeks. This will reduce the possibility of lead movement which may result in loss of stimulation.   -    For post procedure pain, take over-the-counter Tylenol 500 mg and ibuprofen 800 mg together every 8 hours for the next 5 days. .   -    Take your antibiotics as prescribed for 5 days as written to prevent infection  -    Do not drive or operate heavy machinery while stimulator is turned on.   -    Follow up with Dr. Delcid in clinic in 2 weeks.     It is recommended that you relax and limit your activity for the remainder of the day unless you have been told otherwise by your pain physician. You should not drive a car, operate machinery, or make important legal decisions unless otherwise directed by your pain physician.      Some discomfort, bruising or slight swelling may occur at the injection site. This is not abnormal if it occurs.  If needed you may:        o    Take over the counter medication such as Tylenol or Motrin.       o    Apply an ice pack for 30 minutes, 2 to 3 times a day for the first 24 hours.     You do not need to discontinue non-aspirin-containing pain medications prior to an injection (examples: Celebrex, tramadol, hydrocodone and acetaminophen).      Call the Pain Medicine Practice at [phone] between 8am-4pm Monday - Friday if you are experiencing the following:       o    Headache that does not go away with medicine, is worse when sitting or standing up, and is greatly relieved upon lying down.       o    Severe pain worse than or different than your baseline pain.       o    Chills or fever (101º F or greater).       o    Drainage or signs of  infection at the injection site     Go directly to the Emergency Department if you are experiencing the following and received an epidural or spinal injection:       o    Abrupt weakness or progressive weakness in your legs that starts after you leave the clinic.       o    Abrupt severe or worseningnumbness in your legs.       o    Inability to urinate after the injection or loss of bowel or bladder control without the urge to defecate or urinate.     If you have a clinical question that cannot wait until your next appointment, please call [phone] between 8am-4pm Monday - Friday or send a [employer] message. We do our best to return all non-emergency messages within 24 hours, Monday - Friday. A nurse or physician will return your message.      If you need to cancel an appointment please call the scheduling staff at [phone] during normal business hours or leave a message at least 24 hours in advance.

## 2024-12-03 NOTE — OP NOTE
Spinal Cord Stimulator Implant Operative Note     Date: 12/3/2024  OR Location: [facility]    Name: Alana Ashby, : 1955, Age: 69 y.o., MRN: [id number], Sex: female    Diagnosis  Pre-op Diagnosis      * Lumbar post-laminectomy syndrome [M96.1] Post-op Diagnosis     * Lumbar post-laminectomy syndrome [M96.1]     Procedures  Spinal Cord Stimulator Implant  [id number] - IN PRQ IMPLTJ NSTIM ELECTRODE ARRAY EPIDURAL    Spinal Cord Stimulator Implant  [id number] - IN PRQ IMPLTJ NSTIM ELECTRODE ARRAY EPIDURAL    Spinal Cord Stimulator Implant  [id number] - IN INSJ/RPLCMT SPINAL NPG/RCVR POCKET CRTJ&CONNJ    IN ELEC WOODROW IMPLT NPGT CPLX SP/PN PRGRMG [63438]  Surgeons      * Lj Delcid - Primary    Resident/Fellow/Other Assistant:  Surgeons and Role:  * No surgeons found with a matching role *    Staff:   Circulator: Karson Blakely Person: Bhargavi Pena Circulator: Adam Pena Scrub: Rhina    Anesthesia Staff: Anesthesiologist: Nate Antony MD  CRNA: BETTY Garces-CRNA    Procedure Summary  Anesthesia: Monitor Anesthesia Care  ASA: III  Estimated Blood Loss: 5 mL  Intra-op Medications: Administrations occurring from 24 1407 to 24 1407:  * No intraprocedure medications in log *           Anesthesia Record               Intraprocedure I/O Totals          Intake    ceFAZolin (Ancef) 2 g in dextrose (iso)  mL 100.00 mL    Total Intake 100 mL          Specimen: No specimens collected              Drains and/or Catheters: * None in log *    Tourniquet Times:         Implants:  Implants       Type Name Action Serial No.      Spinal Hardware LEAD, 8 CONTACT, 50CM - [id number] Implanted [id number]     Spinal Hardware LEAD, 8 CONTACT, 50CM - [id number] Implanted [id number]     Spinal Hardware ANCHOR KIT, CLIK X - [id number] Implanted               Findings: N/A    Indications: Alana Ashby is an 69 y.o. female who is having surgery for Lumbar post-laminectomy syndrome [M96.1].     SCS DUAL LEAD+ IPG  IMPLANT  PROCEDURE NOTE     PREPROCEDURAL DIAGNOSES: Lumbar PLS  POSTPROCEDURAL  DIAGNOSES: Lumbar PLS    NAME OF PROCEDURE:           1.    Bilateral Percutaneous placement of spinal cord stimulator electrode array for implantation of spinal cord stimulator.   2.    Implantation of programmable Internal Pulse Generator (IPG)     INDICATION FOR PROCEDURE:     Chronic back pain refractory to medical and interventional therapies, highly successful spinal cord stimulation trial for ~1 week, cleared by psychologist with behavioral evaluation     ANESTHESIA: MAC   FLUIDS: 600 ml LR   BLOOD LOSS: 5 mL     IMPLANTS:   50 cm [employer] 8-contact lead  50 cm [employer] 8-contact lead  [employer] Alpha Wavewriter IPG     COMPLICATIONS: None     LOCATION:  [facility]    INFORMED CONSENT: The potential benefits, procedural risks and adverse effects of spinal cord stimulator implantation were discussed with the patient. Patient was told that severe complications such as nerve damage or spinal cord injury resulting in lower extremity weakness numbness, spasticity and that the potential benefit of this procedure would be decreased pain.  After reviewing the procedure with the patient, informed consent to proceed with the placement of spinal cord stimulation lead was obtained.       DESCRIPTION OF PROCEDURE:       The patient was given 2 g cefazolin (preoperative antibiotics) approximately 30 minutes prior to incision.  The patient was placed prone on the operating room table and pressure points were padded. The patient's back was then prepped with chlorhexidine prep and and draped in a sterile fashion.  Anesthesia personnel were present to provide MAC anesthesia.     Under direct fluoroscopic guidance, thoracic and lumbar spine vertebral segments were identified in AP and lateral views.  Local anesthesia with 10 cc of 1% lidocaine with epinephrine was injected subcutaneously at the L2 level. A vertical incision  approximately 4 cm in length was made using a #15-blade.  Using Metzenbaum scissors and blunt dissection, the tissues were dissected down to the supraspinous ligaments/fascia, hemostasis was maintained with Bovie.  A 14-gauge, 4-inch epidural needle from the MATINAS [employer] kit was then placed via right paramedian approach for loss of resistance at the T12-L1 interspace using MYRA to preservative-free saline.  Loss of resistance obtained at T12-L1 without issue.  The stimulating electrode lead was then passed easily through the epidural needle into the epidural space in the lateral fluoroscopic view.  Its terminal position was at mid T7 vertebral body on the right. This target was determined based on T7-8 interspace successful stimulation during trial.    In a similar fashion, a second 14-gauge epidural needle was placed via left paramedian approach and loss of resistance at T12-L1 level.  Once loss of resistance was confirmed, a second stimulating electrode *lead was passed through this needle into the epidural space to the level of mid T7 on the left side. The patient reported no pain or paresthesia during electrode passage through the epidural space.     The stylets were removed from the electrodes and the electrodes were connected to a [employer] Line[employer] power source with the assistance of the device representative in the operating room.  Impedences were evaluated and within expected limits.      With this optimal stimulation, the needles were pulled off the electrodes under live fluoroscopic guidance, to maintain positioning of the left and right electrode at the levels of mid T7 bilaterally.  Anchors were passed over the electrodes and secured to the electrodes using Fixate suturing device with 2.0 Ethibond.  There was no lead migration during this portion of the procedure.      Next, the pocket for the IPG was made in the patient's right flank region after the skin was anesthetized with 10 cc of 1% lidocaine with  epinephrine.   A #15 blade was used to make a 4 cm horizontal incision here.  Using Metzenbaum scissors and blunt dissection a subcutaneous pocket, about 1-2 cm below the skin surface was made to accommodate the IPG snugly.  Hemostasis was maintained with a Bovie here. A gentamicin-soaked gauze was placed in the pocket.  Next, a tunneling bea was passed through the subcutaneous tissues from the midline back incision to the flank pocket site, and the electrodes were passed through this tract without complications.  The extension electrodes were then inserted into the battery device and attached securely with screwdriver.  The device representative confirmed adequate connections and impedances prior to closing the IPG pocket.  Tension loops were made at the back incision and IPG pocket site with the electrode and extension respectively. The IPG was not anchored in the pocket.    The gentamicin-soaked gauze was removed from the IPG pocket.   Next, both wounds were flushed with copious amounts of gentamicin saline and hemostasis was confirmed again.   The flank pocket fascia was closed with two layers of 2-0 interupted vicryl sutures.  The midline back incision was closed with 2-0 interupted vicryl sutures and a running  2-0 vicryl.  Final skin closure at both incision sites was made with a 4-0 monocryl running subcuticular stitch.  Benzoin and Steri-Strips were then placed over the incision sites.   The incisions were dressed with gauze and Tegaderm.    The patient was transferred to the recovery room whereupon motor and sensory testing of the lower extremities showed no new neurologic deficits.  The patient reported no new pain patterns radiating into the lower extremtities. The device representative, programmed the stimulator with patient feedback for optimal stimulation patterns.  The patient reported good paresthesia coverage.  After appropriate recovery time, the patient was discharged home with a 5-day supply of  prophylactic antibiotic.  The patient was instructed to take tylenol or ibuprofen, as needed at home for incisional and was instructed to call the clinic or on-call doctor with any further questions or concerns about the stimulator or the incisions.  The patient is to follow up in clinic in 10-14 days for a wound check and is to refrain from showering for 2 days and from soaking for 2 weeks.    DISPOSITION:                 Home       Lj Delcid MD  Anesthesiologist & Interventional Pain Physician   [facility] [place]  O: [phone]  F: [phone]  1:02 PM  12/03/24

## 2024-12-03 NOTE — H&P
PAIN MANAGEMENT H&P    Date of Service: 12/3/2024  SUBJECTIVE    CHIEF COMPLAINT: LBP    HISTORY OF PRESENT ILLNESS      Alana Ashby is a 69 y.o. female with PMH HTN, GERD, obesity  who presents for Sherrill SCS implant    Pain and overall medical condition unchanged from previous visit. Pt is appropriately NPO. Pt denies new-onset numbness, weakness, bowel/bladder incontinence.  Pt denies recent infection/abx use, allergy to Latex/iodine/contrast. Patient is currently taking the following blood thinner(s): N.A    REVIEW OF SYSTEMS  Review of Systems   Constitutional: Negative.    HENT: Negative.     Eyes: Negative.    Respiratory: Negative.     Cardiovascular: Negative.    Gastrointestinal: Negative.    Endocrine: Negative.    Musculoskeletal:  Positive for back pain and gait problem.   Skin: Negative.    Neurological:  Positive for numbness.   Hematological: Negative.    Psychiatric/Behavioral: Negative.         PAST MEDICAL HISTORY  Past Medical History:   Diagnosis Date    Abdominal mass 06/30/2023    Arthritis 1980?    Chronic pain disorder 1970?    Colon polyp ?2017    Diverticulitis of colon ?2017    Diverticulosis 2017?    Elbow pain 06/30/2023    Encounter for screening for malignant neoplasm of colon 03/26/2014    Encounter for screening colonoscopy    Endometriosis 1991    Epigastric pain 06/30/2023    Fibrocystic breast ?2020    GERD (gastroesophageal reflux disease) 1990?    Hiatal hernia 2014?    Hyperlipidemia     Hypertension 1990?    Knee pain 06/30/2023    Low back pain 1970 ?    Neck pain 2019 ?    Other specified diseases of gallbladder 06/26/2014    Biliary dyskinesia    Personal history of other diseases of the digestive system     History of esophageal reflux    Personal history of other specified conditions 06/04/2014    History of nausea    PONV (postoperative nausea and vomiting) ?1991    Also as recent as 12/22/2022    Right upper quadrant pain 07/10/2014    Abdominal pain, RUQ    Thumb  "pain 06/30/2023    Thyroid cancer (Multi) O7/2024    Thyroid nodule 6/2024    Vitamin D deficiency 2014?     Past Surgical History:   Procedure Laterality Date    CHOLECYSTECTOMY  2014    COLONOSCOPY  2021    COLONOSCOPY W/ POLYPECTOMY  2014    ESOPHAGOGASTRODUODENOSCOPY  2014    HYSTERECTOMY  2013    LUMBAR FUSION  2022    L4, L5, and S1    MR HEAD ANGIO WO IV CONTRAST  04/24/2015    MR HEAD ANGIO WO IV CONTRAST 4/24/2015 [facility]GAC[facility]    MR NECK ANGIO WO IV CONTRAST  04/24/2015    MR NECK ANGIO WO IV CONTRAST 4/24/2015 [facility] ANCILL[facility] LEGAC[facility]    OTHER SURGICAL HISTORY  2024    Insertion spinal cord stimulator trial    THYROIDECTOMY Bilateral 08/21/2024    UPPER GASTROINTESTINAL ENDOSCOPY  1990    WISDOM TOOTH EXTRACTION  1971     Family History   Problem Relation Name Age of Onset    Cancer Mother Janet Young     Hypertension Mother Janet Young     Anesthesia problems Mother Janet Young     Anesthesia related problems Mother Janet Young     Diabetes Father Jim Young     Stroke Father Jim Young        CURRENT MEDICATIONS  Current Facility-Administered Medications   Medication Dose Route Frequency Provider Last Rate Last Admin    ceFAZolin (Ancef) 2 g in dextrose (iso)  mL  2 g intravenous Once Lj Delcid MD           ALLERGIES AND DRUG REACTIONS  Allergies   Allergen Reactions    Baclofen Unknown    Gabapentin Other     Memory loss          OBJECTIVE  Visit Vitals  BP (!) 162/95   Pulse 81   Temp 36.8 °C (98.2 °F) (Temporal)   Resp 16   Ht 1.727 m (5' 8\")   Wt 102 kg (224 lb)   SpO2 98%   BMI 34.06 kg/m²   OB Status Postmenopausal   Smoking Status Never   BSA 2.21 m²     General: Lying comfortably in bed, NAD  Head: NCAT  Eyes: Sclera/conjunctiva clear, EOMI, PERRL  Nose/mouth: MMM  CV: Good distal pulses  Lungs: Good/equal chest excursion  Abdomen: Soft, ND  Ext: No cyanosis/edema  MSK: Able to move extremities  Neuro: AAOx3, grossly normal  Psych: affect nl      REVIEW OF LABORATORY " "DATA  I have reviewed the following lab results:  No results found for: \"WBC\", \"RBC\", \"HGB\", \"HCT\", \"MCV\", \"MCH\", \"MCHC\", \"RDW\", \"PLT\", \"MPV\"  No results found for: \"NA\", \"K\", \"CO2\", \"BUN\", \"CALCIUM\"  No results found for: \"PROTIME\", \"PTT\", \"INR\", \"FIBRINOGEN\"      REVIEW OF RADIOLOGY DATA  I have reviewed the following:  Radiology Studies           MRI L-spine 5/23/24:   L4-S1 lami/fusion stable, multilevel spondylosis featuring L3-4 disc bulge/fissure contributing to mild stenosis     ASSESSMENT & PLAN  Alana Ashby is a 69 y.o. female with PMH HTN, GERD, obesity  who presents for Carmi SCS implant    1) lumbar PLS   -s/p L4-S1 fusion 2022 worsening since 2023 with subj RLE numbness  -Refractive to  >1 y conservative tx including Tylenol, NSAIDs, >6 w PT, gabapentin  -Saw Dr Clements 12/8 where no additional surgery was rec'd  -MRI L-spine 5/23/24: L4-S1 lami/fusion stable, multilevel spondylosis featuring L3-4 disc bulge/fissure contributing to mild stenosis  -Caudal CHRISTIANA 6/11/24: 85% relief, now diminishing  -Carmi SCS trial 10/17/24: 60% relief. Improved standing, walking, activity tolerance. Tolerating chores better and improved QoL. Wants to move fwd with implant  -Carmi SCS implant today to target pain generator as seen on imaging and minimize risk/likelihood of chronic opioid use and/or surgery        Discussed procedure risks/benefits in detail with patient. Pt meets medical necessity for procedure due to failure of conservative measures. Reviewed procedural risks including bleeding, infection, nerve damage, paralysis. Also reviewed mitigating factors such as screening for infection/blood thinner use, sterile precautions, and image-guidance when applicable. All questions answered. Pt/guardian expressed understanding and choose to proceed            Lj Delcid MD  Anesthesiologist & Interventional Pain Physician   [facility] [place]  O: [phone]  F: [phone]  11:23 AM  12/03/24    "

## 2024-12-13 ENCOUNTER — OFFICE VISIT (OUTPATIENT)
Dept: PAIN MEDICINE | Facility: CLINIC | Age: 69
End: 2024-12-13
Payer: MEDICARE

## 2024-12-13 VITALS — DIASTOLIC BLOOD PRESSURE: 80 MMHG | HEART RATE: 78 BPM | OXYGEN SATURATION: 96 % | SYSTOLIC BLOOD PRESSURE: 138 MMHG

## 2024-12-13 DIAGNOSIS — Z96.82 PRESENCE OF NEUROSTIMULATOR: ICD-10-CM

## 2024-12-13 DIAGNOSIS — G89.29 CHRONIC BILATERAL LOW BACK PAIN WITH RIGHT-SIDED SCIATICA: ICD-10-CM

## 2024-12-13 DIAGNOSIS — M54.41 CHRONIC BILATERAL LOW BACK PAIN WITH RIGHT-SIDED SCIATICA: ICD-10-CM

## 2024-12-13 DIAGNOSIS — M96.1 LUMBAR POST-LAMINECTOMY SYNDROME: Primary | ICD-10-CM

## 2024-12-13 PROCEDURE — 1160F RVW MEDS BY RX/DR IN RCRD: CPT | Performed by: ANESTHESIOLOGY

## 2024-12-13 PROCEDURE — 3075F SYST BP GE 130 - 139MM HG: CPT | Performed by: ANESTHESIOLOGY

## 2024-12-13 PROCEDURE — 99024 POSTOP FOLLOW-UP VISIT: CPT | Performed by: ANESTHESIOLOGY

## 2024-12-13 PROCEDURE — 3079F DIAST BP 80-89 MM HG: CPT | Performed by: ANESTHESIOLOGY

## 2024-12-13 PROCEDURE — 1159F MED LIST DOCD IN RCRD: CPT | Performed by: ANESTHESIOLOGY

## 2024-12-13 PROCEDURE — 1125F AMNT PAIN NOTED PAIN PRSNT: CPT | Performed by: ANESTHESIOLOGY

## 2024-12-13 PROCEDURE — 99214 OFFICE O/P EST MOD 30 MIN: CPT | Performed by: ANESTHESIOLOGY

## 2024-12-13 PROCEDURE — 95972 ALYS CPLX SP/PN NPGT W/PRGRM: CPT | Performed by: ANESTHESIOLOGY

## 2024-12-13 ASSESSMENT — ENCOUNTER SYMPTOMS
EYES NEGATIVE: 1
OCCASIONAL FEELINGS OF UNSTEADINESS: 0
NUMBNESS: 1
PSYCHIATRIC NEGATIVE: 1
GASTROINTESTINAL NEGATIVE: 1
CONSTITUTIONAL NEGATIVE: 1
BACK PAIN: 1
ENDOCRINE NEGATIVE: 1
CARDIOVASCULAR NEGATIVE: 1
LOSS OF SENSATION IN FEET: 0
HEMATOLOGIC/LYMPHATIC NEGATIVE: 1
DEPRESSION: 0
RESPIRATORY NEGATIVE: 1

## 2024-12-13 ASSESSMENT — PAIN SCALES - GENERAL
PAINLEVEL_OUTOF10: 7
PAINLEVEL_OUTOF10: 7

## 2024-12-13 ASSESSMENT — PAIN DESCRIPTION - DESCRIPTORS: DESCRIPTORS: SHARP

## 2024-12-13 ASSESSMENT — PAIN - FUNCTIONAL ASSESSMENT: PAIN_FUNCTIONAL_ASSESSMENT: 0-10

## 2024-12-13 NOTE — PROGRESS NOTES
PAIN MANAGEMENT FOLLOW-UP OFFICE NOTE    Date of Service: 12/13/2024    SUBJECTIVE    CHIEF COMPLAINT: LBP    HISTORY OF PRESENT ILLNESS    Alana Ashby is a 69 y.o. female with PMH HTN, GERD, obesity who presents for F/U LB pain.    On 12/3, pt underwent [place] SCS implant with 80% relief thus far. Complains of mild tugging/sharpness at R IPG site.     Pt denies new-onset weakness, bowel/bladder incontinence.  Pt denies recent infection, allergy to Latex/iodine/contrast. Patient is currently taking the following blood thinner(s): N/A    Procedure log:  -[place] SCS implant 12/3/24: 80% ongoing relief  -[place] SCS trial 10/17/24: 60% relief  -QUINTIN 6/25/24: 85% relief ongoing  -Caudal CHRISTIANA 6/11/24: 85% relief 1 mo, 75% 3 mo      REVIEW OF SYSTEMS  Review of Systems   Constitutional: Negative.    HENT: Negative.     Eyes: Negative.    Respiratory: Negative.     Cardiovascular: Negative.    Gastrointestinal: Negative.    Endocrine: Negative.    Musculoskeletal:  Positive for back pain and gait problem.   Skin: Negative.    Neurological:  Positive for numbness.   Hematological: Negative.    Psychiatric/Behavioral: Negative.         PAST MEDICAL HISTORY  Past Medical History:   Diagnosis Date    Abdominal mass 06/30/2023    Arthritis 1980?    Chronic pain disorder 1970?    Colon polyp ?2017    Diverticulitis of colon ?2017    Diverticulosis 2017?    Elbow pain 06/30/2023    Encounter for screening for malignant neoplasm of colon 03/26/2014    Encounter for screening colonoscopy    Endometriosis 1991    Epigastric pain 06/30/2023    Fibrocystic breast ?2020    GERD (gastroesophageal reflux disease) 1990?    Hiatal hernia 2014?    Hyperlipidemia     Hypertension 1990?    Knee pain 06/30/2023    Low back pain 1970 ?    Neck pain 2019 ?    Other specified diseases of gallbladder 06/26/2014    Biliary dyskinesia    Personal history of other diseases of the digestive system     History of esophageal reflux    Personal history of  other specified conditions 06/04/2014    History of nausea    PONV (postoperative nausea and vomiting) ?1991    Also as recent as 12/22/2022    Right upper quadrant pain 07/10/2014    Abdominal pain, RUQ    Thumb pain 06/30/2023    Thyroid cancer (Multi) O7/2024    Thyroid nodule 6/2024    Vitamin D deficiency 2014?     Past Surgical History:   Procedure Laterality Date    CHOLECYSTECTOMY  2014    COLONOSCOPY  2021    COLONOSCOPY W/ POLYPECTOMY  2014    ESOPHAGOGASTRODUODENOSCOPY  2014    HYSTERECTOMY  2013    LUMBAR FUSION  2022    L4, L5, and S1    MR HEAD ANGIO WO IV CONTRAST  04/24/2015    MR HEAD ANGIO WO IV CONTRAST 4/24/2015 [facility]    MR NECK ANGIO WO IV CONTRAST  04/24/2015    MR NECK ANGIO WO IV CONTRAST 4/24/2015 [facility]    OTHER SURGICAL HISTORY  2024    Insertion spinal cord stimulator trial    THYROIDECTOMY Bilateral 08/21/2024    UPPER GASTROINTESTINAL ENDOSCOPY  1990    WISDOM TOOTH EXTRACTION  1971     Family History   Problem Relation Name Age of Onset    Cancer Mother Janet Young     Hypertension Mother Janet Young     Anesthesia problems Mother Janet Young     Anesthesia related problems Mother Janet Young     Diabetes Father Jim Young     Stroke Father Jim Young        CURRENT MEDICATIONS  Current Outpatient Medications   Medication Sig Dispense Refill    atorvastatin (Lipitor) 20 mg tablet Take 1 tablet (20 mg) by mouth once daily. as directed 90 tablet 3    b complex 0.4 mg tablet Take 1 tablet by mouth once daily.      cholecalciferol (Vitamin D-3) 50 MCG (2000 UT) tablet Take 1 tablet (50 mcg) by mouth once daily.      ibuprofen 200 mg tablet Take by mouth every 6 hours if needed for mild pain (1 - 3).      levothyroxine (Synthroid, Levoxyl) 100 mcg tablet Take 1 tablet (100 mcg) by mouth early in the morning.. 90 tablet 3    lisinopril 20 mg tablet Take 1 tablet (20 mg) by mouth once daily. as directed 90 tablet 3    magnesium oxide (Mag-Ox) 400 mg  tablet 1 tablet (400 mg) once daily.      omeprazole (PriLOSEC) 40 mg DR capsule Take 1 capsule (40 mg) by mouth once daily in the morning. Take before meals. 90 capsule 3    Pain Reliever, acetaminophen, 500 mg tablet TAKE 1 TABLET BY MOUTH EVERY 4 TO 6 HOURS AS NEEDED      wheat dextrin (BENEFIBER CLEAR SF, DEXTRIN, ORAL) Take by mouth.       No current facility-administered medications for this visit.       ALLERGIES AND DRUG REACTIONS  Allergies   Allergen Reactions    Baclofen Unknown    Gabapentin Other     Memory loss          OBJECTIVE  Visit Vitals  /80   Pulse 78   SpO2 96%   OB Status Postmenopausal   Smoking Status Never       Last Recorded Pain Score (if available):         Pain Score:   7     Physical Exam  Vitals and nursing note reviewed.       General: Sitting in chair, NAD  Head: NCAT  Eyes: Sclera/conjunctiva clear, EOMI, PERRL  Nose/mouth: MMM  CV: Good distal pulses  Lungs: Good/equal chest excursion  Abdomen: Soft, ND  Ext: No cyanosis/edema  MSK: L-spine: unremarkable alignment, BL paraspinal m TTP with L-spine TTP over surgical area, L-spine ROM: extension/flexion lmtd by pain    Neuro: AAOx3, Cn grossly nl  Dermatome sensation to light touch  LEFT L1 (lower pelvis/upper thigh): WNL    RIGHT L1: WNL      LEFT L2 (upper thigh): WNL       RIGHT: L2:WNL      LEFT L3 (medial knee): WNL       RIGHT L3: WNL      LEFT L4 (superior medial malleolus): WNL       RIGHT L4: WNL      LEFT L5 (dorsal foot): WNL       RIGHT L5: WNL      LEFT S1 (lateral foot): WNL     RIGHT S1: WNL      LEFT S2 (popliteal fossa): WNL    RIGHT S2: WNL        Motor strength  LEFT L2 (hip flexion): 5/5   RIGHT L2: 5/5  LEFT L3 (knee extension): 5/5     RIGHT L3: 5/5  LEFT L4 (dorsiflexion): 5/5     RIGHT L4: 5/5  LEFT L5 (EHL extension): 5/5     RIGHT L5: 5/5  LEFT S1 (plantarflexion): 5/5     RIGHT S1: 5/5  LEFT S2 (knee flexion): 5/5      RIGHT S2: 5/5      Psych: affect nl  Skin: no rash/lesions, SCS wound dressings  removed to reveal steri strips CSI. Mild IPG site swelling and TTP without erythema, drainage      REVIEW OF LABORATORY DATA  I have reviewed the following lab results:  WBC   Date Value Ref Range Status   11/21/2024 8.1 4.4 - 11.3 x10*3/uL Final     RBC   Date Value Ref Range Status   11/21/2024 4.47 4.00 - 5.20 x10*6/uL Final     Hemoglobin   Date Value Ref Range Status   11/21/2024 14.3 12.0 - 16.0 g/dL Final     Hematocrit   Date Value Ref Range Status   11/21/2024 43.1 36.0 - 46.0 % Final     MCV   Date Value Ref Range Status   11/21/2024 96 80 - 100 fL Final     MCH   Date Value Ref Range Status   11/21/2024 32.0 26.0 - 34.0 pg Final     MCHC   Date Value Ref Range Status   11/21/2024 33.2 32.0 - 36.0 g/dL Final     RDW   Date Value Ref Range Status   11/21/2024 12.4 11.5 - 14.5 % Final     Platelets   Date Value Ref Range Status   11/21/2024 198 150 - 450 x10*3/uL Final     Sodium   Date Value Ref Range Status   11/21/2024 140 136 - 145 mmol/L Final     Potassium   Date Value Ref Range Status   11/21/2024 4.2 3.5 - 5.3 mmol/L Final     Bicarbonate   Date Value Ref Range Status   11/21/2024 30 21 - 32 mmol/L Final     Urea Nitrogen   Date Value Ref Range Status   11/21/2024 17 6 - 23 mg/dL Final     Calcium   Date Value Ref Range Status   11/21/2024 9.4 8.6 - 10.3 mg/dL Final     Protime   Date Value Ref Range Status   07/30/2024 11.3 9.8 - 12.8 seconds Final     INR   Date Value Ref Range Status   07/30/2024 1.0 0.9 - 1.1 Final         REVIEW OF RADIOLOGY   I have reviewed the following:  Radiology Studies      CT c-spine 5/24/24:  No acute fracture or posttraumatic subluxation.      C2-3: No significant central canal or foraminal stenosis.  C3-4: No significant central canal or foraminal stenosis.  C4-5: Uncovertebral osteophytes are noted more on the right side  minimally narrowing the right neuroforamen C5-6: Disc osteophyte  complex indents the ventral thecal sac. Uncovertebral osteophytes  moderately  narrow the right and minimally narrow the left  neuroforamen. C6-7: No significant central canal or foraminal  stenosis. C7-T1: Hypertrophic facet changes are noted minimally  narrowing the neuroforamina.      4 mL indistinct nodule right upper lung image 417 series 201 for  instance incompletely evaluated on the current exam and nonspecific.  1.6 cm hypodense nodule left thyroid lobe with chunky calcification  incidentally noted. The thyroid gland is otherwise heterogeneous in  appearance.          IMPRESSION:  Degenerative changes of the cervical spine as above described worst  at C5-6. If further evaluation of the central canal and neuroforamina  were clinically necessary MRI could be considered.          MRI L-spine 5/23/24:  This report assumes 5 non-rib bearing lumbar vertebral bodies. The  lowest intervertebral disc will be labeled L5-S1.      There are new postoperative changes from right laminectomy at L4-L5  with posterior spinal fusion including placement of bilateral  pedicular screws which terminate within the L4 and L5 vertebral  bodies and are interconnected by parallel rods. There are also  intervertebral disc spacers at L4-L5 and L5-S1. Per clinical note,  patient underwent left L5-S1 laminectomy which is partially  visualized. Current study is not tailored to assess the surgical  hardware.      Grade 1 anterolisthesis at L4-L5 and L5-S1 seen on the prior MRI has  decreased. Otherwise, there is no striking spondylolisthesis.  Vertebral body and remaining intervertebral disc heights are  maintained. There are chronic degenerative endplate changes at few  levels including mild endplate scalloping. Marrow signal is overall  within normal limits. There is a hemangioma within the L1 vertebral  body.      The conus medullaris terminates at the level of L1-L2 and is  unremarkable in appearance.      At T12-L1 and L1-L2, there is no posterior disc contour abnormality.  There is no spinal canal stenosis or  neural foraminal narrowing.  There is no facet osteoarthropathy.      At L2-L3, there is a stable small diffuse disc bulge. There is no  spinal canal stenosis or neural foraminal narrowing. There is no  striking facet osteoarthropathy.      At L3-L4, there is a small diffuse disc bulge with a central annular  fissure which causes mild spinal canal stenosis. There is no neural  foraminal narrowing. There is no facet osteoarthropathy.      At L4-L5, there is no posterior disc contour abnormality. There is no  spinal canal stenosis or neural foraminal narrowing. It is somewhat  difficult to evaluate the facet joints due to susceptibility  artifact, noting this, bilateral facet osteoarthropathy has  decreased, likely sequela of prior facetectomy.      At L5-S1, there is no posterior disc contour abnormality. There is no  spinal canal stenosis or striking neural foraminal narrowing. Partial  visualization of left laminectomy. Bilateral facet osteoarthropathy  has decreased, possibly sequela of recent surgery.      IMPRESSION:  1. Postoperative changes from laminectomies and spinal fusion at  L4-L5 and L5-S1 with resolution of spinal canal stenosis L4-L5.      2. Otherwise, essentially stable multilevel degenerative changes as  detailed above.             XR L-spine 12/78/23:  Laminectomy posterior fusion L4 through S1. Kyphoplasty changes at  the L4 and S1 level. There is mild anterolisthesis L4-L5 and L5 on  S1, similar to the prior. The remainder of the lumbar spine is  unremarkable      IMPRESSION:  Postsurgical changes L4-S1 without evidence of hardware failure or  malalignment.         ASSESSMENT & PLAN  Alana Ashby is a 69 y.o. female with PMH HTN, GERD, obesity who presents for F/U LB pain     1) Lumbar PLS  -s/p L4-S1 fusion 2022 worsening since 2023 with subj RLE numbness  -Refractive to  >1 y conservative tx including Tylenol, NSAIDs, >6 w PT, gabapentin, CHRISTIANA  -Saw Dr Clements 12/8 where no additional surgery  was rec'd  -MRI L-spine 5/23/24: L4-S1 lami/fusion stable, multilevel spondylosis featuring L3-4 disc bulge/fissure contributing to mild stenosis  -[place] SCS implant 12/3/24: 80% ongoing relief. Mild IPG site TTP assoc with routine healing. Complex reprogramming provided  -F/U 4-6 w        Today's visit involved continuation of chronic pain care. In the context of the complexity of this patient's chronic pain diagnosis, long-term expectations and care planning discussed. Adequate time taken to ensure patient understanding and answer questions. Imaging studies ordered are placed do elucidate the patient's diagnosis, but also to evaluate the patient's candidacy for procedural and surgical interventions. The risks and benefits of these potential interventions are detailed as above.                      Lj Delcid MD  Anesthesiologist & Interventional Pain Physician   [facility] [place]  O: [phone]  F: [phone]  9:46 AM  12/13/24

## 2024-12-16 PROBLEM — Z96.82 PRESENCE OF NEUROSTIMULATOR: Status: ACTIVE | Noted: 2024-12-16

## 2024-12-18 ENCOUNTER — TELEPHONE (OUTPATIENT)
Dept: ENDOCRINOLOGY | Facility: CLINIC | Age: 69
End: 2024-12-18
Payer: MEDICARE

## 2024-12-18 NOTE — TELEPHONE ENCOUNTER
Pt called regarding lab order. Pt informed digital copy in system available at the  Labs and reminded to please hold biotin (B complex, B7) for 2 days before any blood draw. Pt expressed verbal understanding and appreciation

## 2024-12-30 ENCOUNTER — LAB (OUTPATIENT)
Dept: LAB | Facility: LAB | Age: 69
End: 2024-12-30
Payer: MEDICARE

## 2024-12-30 DIAGNOSIS — C73 THYROID CANCER (MULTI): ICD-10-CM

## 2024-12-30 LAB — TSH SERPL-ACNC: 2.21 MIU/L (ref 0.44–3.98)

## 2024-12-30 PROCEDURE — 84443 ASSAY THYROID STIM HORMONE: CPT

## 2025-01-07 ENCOUNTER — APPOINTMENT (OUTPATIENT)
Dept: ENDOCRINOLOGY | Facility: CLINIC | Age: 70
End: 2025-01-07
Payer: MEDICARE

## 2025-01-07 VITALS
DIASTOLIC BLOOD PRESSURE: 84 MMHG | WEIGHT: 228 LBS | SYSTOLIC BLOOD PRESSURE: 130 MMHG | BODY MASS INDEX: 34.67 KG/M2 | HEART RATE: 84 BPM

## 2025-01-07 DIAGNOSIS — C73 THYROID CANCER (MULTI): Primary | ICD-10-CM

## 2025-01-07 PROCEDURE — 1160F RVW MEDS BY RX/DR IN RCRD: CPT | Performed by: INTERNAL MEDICINE

## 2025-01-07 PROCEDURE — 99213 OFFICE O/P EST LOW 20 MIN: CPT | Performed by: INTERNAL MEDICINE

## 2025-01-07 PROCEDURE — 1036F TOBACCO NON-USER: CPT | Performed by: INTERNAL MEDICINE

## 2025-01-07 PROCEDURE — G2211 COMPLEX E/M VISIT ADD ON: HCPCS | Performed by: INTERNAL MEDICINE

## 2025-01-07 PROCEDURE — 3079F DIAST BP 80-89 MM HG: CPT | Performed by: INTERNAL MEDICINE

## 2025-01-07 PROCEDURE — 3075F SYST BP GE 130 - 139MM HG: CPT | Performed by: INTERNAL MEDICINE

## 2025-01-07 PROCEDURE — 1159F MED LIST DOCD IN RCRD: CPT | Performed by: INTERNAL MEDICINE

## 2025-01-07 ASSESSMENT — ENCOUNTER SYMPTOMS
NECK PAIN: 0
FATIGUE: 1
NERVOUS/ANXIOUS: 0
SHORTNESS OF BREATH: 0
TREMORS: 0
ABDOMINAL PAIN: 0
TROUBLE SWALLOWING: 0
NAUSEA: 0
CONSTIPATION: 0
FEVER: 0
PALPITATIONS: 0
DIARRHEA: 0
WEAKNESS: 0
HEADACHES: 0
UNEXPECTED WEIGHT CHANGE: 0
VOMITING: 0

## 2025-01-07 NOTE — PROGRESS NOTES
History Of Present Illness  Alana Ashby is a 69 y.o. female with a history of thyroid cancer.     Interval history of spinal nerve stimulator  Fatigue    Levothyroxine decreased from 125 to 100 mcg/day  Patient is taking levothyroxine on an empty stomach with water alone.     Thyroid cancer diagnosis date:  8/21/24       Type:  Papillary  Size of primary tumor:      2.2 cm, right lobe  Other foci:  0.9 cm right lobe  1.7 cm left lobe  1.3 cm left lobe  Extrathyroidal extension (ETE):  Negative  Lymph Nodes:        Distant Metastases:  None Known  Pathologic Staging:  pT2      N0      Mx.   BRAF V600E mutation positive left lobe nodules staining     Surgery:  Thyroidectomy (8/21/24)        Past Medical History  She has a past medical history of Abdominal mass (06/30/2023), Arthritis (1980?), Chronic pain disorder (1970?), Colon polyp (?2017), Diverticulitis of colon (?2017), Diverticulosis (2017?), Elbow pain (06/30/2023), Encounter for screening for malignant neoplasm of colon (03/26/2014), Endometriosis (1991), Epigastric pain (06/30/2023), Fibrocystic breast (?2020), GERD (gastroesophageal reflux disease) (1990?), Hiatal hernia (2014?), Hyperlipidemia, Hypertension (1990?), Knee pain (06/30/2023), Low back pain (1970 ?), Neck pain (2019 ?), Other specified diseases of gallbladder (06/26/2014), Personal history of other diseases of the digestive system, Personal history of other specified conditions (06/04/2014), PONV (postoperative nausea and vomiting) (?1991), Right upper quadrant pain (07/10/2014), Thumb pain (06/30/2023), Thyroid cancer (Multi) (O7/2024), Thyroid nodule (6/2024), and Vitamin D deficiency (2014?).    Surgical History  She has a past surgical history that includes Hysterectomy (2013); Esophagogastroduodenoscopy (2014); Cholecystectomy (2014); Colonoscopy w/ polypectomy (2014); MR angio neck wo IV contrast (04/24/2015); MR angio head wo IV contrast (04/24/2015); Thyroidectomy (Bilateral,  08/21/2024); [place] tooth extraction (1971); Colonoscopy (2021); Upper gastrointestinal endoscopy (1990); Lumbar fusion (2022); and Other surgical history (2024).     Social History  She reports that she has never smoked. She has never been exposed to tobacco smoke. She has never used smokeless tobacco. She reports that she does not drink alcohol and does not use drugs.    Family History  Family History   Problem Relation Name Age of Onset    Cancer Mother Janet Young     Hypertension Mother Janet Young     Anesthesia problems Mother Janet Young     Anesthesia related problems Mother Janet Young     Diabetes Father Jim Young     Stroke Father Jim Young        Medications  Current Outpatient Medications   Medication Instructions    atorvastatin (LIPITOR) 20 mg, oral, Daily, as directed    b complex 0.4 mg tablet 1 tablet, Daily    cholecalciferol (VITAMIN D-3) 50 mcg, Daily    ibuprofen 200 mg tablet Every 6 hours PRN    levothyroxine (SYNTHROID, LEVOXYL) 100 mcg, oral, Daily    lisinopril 20 mg, oral, Daily, as directed    magnesium oxide (MAG-OX) 400 mg, Daily    omeprazole (PRILOSEC) 40 mg, oral, Daily before breakfast    Pain Reliever, acetaminophen, 500 mg tablet TAKE 1 TABLET BY MOUTH EVERY 4 TO 6 HOURS AS NEEDED    wheat dextrin (BENEFIBER CLEAR SF, DEXTRIN, ORAL) Take by mouth.       Allergies  Baclofen and Gabapentin    Review of Systems   Constitutional:  Positive for fatigue. Negative for fever and unexpected weight change.   HENT:  Negative for trouble swallowing.    Eyes:  Negative for visual disturbance.   Respiratory:  Negative for shortness of breath.    Cardiovascular:  Negative for chest pain and palpitations.   Gastrointestinal:  Negative for abdominal pain, constipation, diarrhea, nausea and vomiting.   Endocrine: Negative for cold intolerance and heat intolerance.   Musculoskeletal:  Negative for neck pain.   Skin:  Negative for rash.   Neurological:  Negative for tremors, weakness  and headaches.   Psychiatric/Behavioral:  The patient is not nervous/anxious.          Last Recorded Vitals  Blood pressure 130/84, pulse 84, weight 103 kg (228 lb).    Physical Exam  Constitutional:       General: She is not in acute distress.  Neurological:      Mental Status: She is alert.   Psychiatric:         Mood and Affect: Affect normal.          Relevant Results   Latest Reference Range & Units 12/30/24 11:20   Thyroid Stimulating Hormone 0.44 - 3.98 mIU/L 2.21        Latest Reference Range & Units 10/02/24 11:27   Thyroid Stimulating Hormone 0.44 - 3.98 mIU/L 0.05 (L)   Thyroglobulin 1.3 - 31.8 ng/mL 0.3 (L)   Thyroglobulin LC-MS/MS 1.3 - 31.8 ng/mL Not Applicable   Anti-Thyroglobulin AB 0.0 - 4.0 IU/mL 3.4       IMPRESSION  PAPILLARY THYROID CARCINOMA (PTC)  Multifocal PTC in both lobes  Negative for extrathyroidal extension or local lymph node involvement  BRAF V600E mutation is associated with invasive disease, but there are no invasive findings on her thyroidectomy.  No thyroglobulin evidence of persistent disease, post-operatively.   TSH optimal       RECOMMENDATIONS  Continue levothyroxine 100 mcg/day  Take levothyroxine on an empty stomach with water alone, 1 hour before eating or taking other medications, 4 hours before any calcium or iron supplement.    Follow up July/August  Draw TSH, thyroglobulin 1 week before next appointment  Ultrasound neck before next appointment

## 2025-01-07 NOTE — PATIENT INSTRUCTIONS
RECOMMENDATIONS  Continue levothyroxine 100 mcg/day  Take levothyroxine on an empty stomach with water alone, 1 hour before eating or taking other medications, 4 hours before any calcium or iron supplement.    Follow up July/August  Draw TSH, thyroglobulin 1 week before next appointment  Ultrasound neck before next appointment

## 2025-01-13 ENCOUNTER — OFFICE VISIT (OUTPATIENT)
Dept: PAIN MEDICINE | Facility: CLINIC | Age: 70
End: 2025-01-13
Payer: MEDICARE

## 2025-01-13 VITALS — DIASTOLIC BLOOD PRESSURE: 70 MMHG | OXYGEN SATURATION: 97 % | SYSTOLIC BLOOD PRESSURE: 116 MMHG | HEART RATE: 81 BPM

## 2025-01-13 DIAGNOSIS — M96.1 LUMBAR POST-LAMINECTOMY SYNDROME: Primary | ICD-10-CM

## 2025-01-13 DIAGNOSIS — Z96.82 PRESENCE OF NEUROSTIMULATOR: ICD-10-CM

## 2025-01-13 PROCEDURE — 1160F RVW MEDS BY RX/DR IN RCRD: CPT | Performed by: ANESTHESIOLOGY

## 2025-01-13 PROCEDURE — 3074F SYST BP LT 130 MM HG: CPT | Performed by: ANESTHESIOLOGY

## 2025-01-13 PROCEDURE — 95972 ALYS CPLX SP/PN NPGT W/PRGRM: CPT | Performed by: ANESTHESIOLOGY

## 2025-01-13 PROCEDURE — 1125F AMNT PAIN NOTED PAIN PRSNT: CPT | Performed by: ANESTHESIOLOGY

## 2025-01-13 PROCEDURE — 99214 OFFICE O/P EST MOD 30 MIN: CPT | Performed by: ANESTHESIOLOGY

## 2025-01-13 PROCEDURE — 99214 OFFICE O/P EST MOD 30 MIN: CPT | Mod: 25 | Performed by: ANESTHESIOLOGY

## 2025-01-13 PROCEDURE — 1159F MED LIST DOCD IN RCRD: CPT | Performed by: ANESTHESIOLOGY

## 2025-01-13 PROCEDURE — 3078F DIAST BP <80 MM HG: CPT | Performed by: ANESTHESIOLOGY

## 2025-01-13 ASSESSMENT — PAIN SCALES - GENERAL
PAINLEVEL_OUTOF10: 4
PAINLEVEL_OUTOF10: 4

## 2025-01-13 ASSESSMENT — PAIN - FUNCTIONAL ASSESSMENT: PAIN_FUNCTIONAL_ASSESSMENT: 0-10

## 2025-01-13 ASSESSMENT — ENCOUNTER SYMPTOMS
OCCASIONAL FEELINGS OF UNSTEADINESS: 0
NUMBNESS: 1
GASTROINTESTINAL NEGATIVE: 1
RESPIRATORY NEGATIVE: 1
PSYCHIATRIC NEGATIVE: 1
LOSS OF SENSATION IN FEET: 0
EYES NEGATIVE: 1
DEPRESSION: 0
BACK PAIN: 1
CARDIOVASCULAR NEGATIVE: 1
HEMATOLOGIC/LYMPHATIC NEGATIVE: 1
CONSTITUTIONAL NEGATIVE: 1
ENDOCRINE NEGATIVE: 1

## 2025-01-13 ASSESSMENT — PAIN DESCRIPTION - DESCRIPTORS: DESCRIPTORS: SHARP;OTHER (COMMENT)

## 2025-01-13 NOTE — PROGRESS NOTES
PAIN MANAGEMENT FOLLOW-UP OFFICE NOTE    Date of Service: 1/13/2025    SUBJECTIVE    CHIEF COMPLAINT: LBP    HISTORY OF PRESENT ILLNESS    Alana Ashby is a 69 y.o. female with PMH HTN, GERD, obesity who presents for F/U LB pain.    Pt reports ongoing 80% relief since [place] SCS implant. Notes slowly-improving tugging/sharpness at midline and R IPG site. Denies issue with scar, drainage, tenderness    Pt denies new-onset weakness, bowel/bladder incontinence.  Pt denies recent infection, allergy to Latex/iodine/contrast. Patient is currently taking the following blood thinner(s): N/A    Procedure log:  -[place] SCS implant 12/3/24: 80% ongoing relief  -[place] SCS trial 10/17/24: 60% relief  -QUINTIN 6/25/24: 85% relief ongoing  -Caudal CHRISTIANA 6/11/24: 85% relief 1 mo, 75% 3 mo      REVIEW OF SYSTEMS  Review of Systems   Constitutional: Negative.    HENT: Negative.     Eyes: Negative.    Respiratory: Negative.     Cardiovascular: Negative.    Gastrointestinal: Negative.    Endocrine: Negative.    Musculoskeletal:  Positive for back pain and gait problem.   Skin: Negative.    Neurological:  Positive for numbness.   Hematological: Negative.    Psychiatric/Behavioral: Negative.         PAST MEDICAL HISTORY  Past Medical History:   Diagnosis Date    Abdominal mass 06/30/2023    Arthritis 1980?    Chronic pain disorder 1970?    Colon polyp ?2017    Diverticulitis of colon ?2017    Diverticulosis 2017?    Elbow pain 06/30/2023    Encounter for screening for malignant neoplasm of colon 03/26/2014    Encounter for screening colonoscopy    Endometriosis 1991    Epigastric pain 06/30/2023    Fibrocystic breast ?2020    GERD (gastroesophageal reflux disease) 1990?    Hiatal hernia 2014?    Hyperlipidemia     Hypertension 1990?    Knee pain 06/30/2023    Low back pain 1970 ?    Neck pain 2019 ?    Other specified diseases of gallbladder 06/26/2014    Biliary dyskinesia    Personal history of other diseases of the digestive system      History of esophageal reflux    Personal history of other specified conditions 06/04/2014    History of nausea    PONV (postoperative nausea and vomiting) ?1991    Also as recent as 12/22/2022    Right upper quadrant pain 07/10/2014    Abdominal pain, RUQ    Thumb pain 06/30/2023    Thyroid cancer (Multi) O7/2024    Thyroid nodule 6/2024    Vitamin D deficiency 2014?     Past Surgical History:   Procedure Laterality Date    CHOLECYSTECTOMY  2014    COLONOSCOPY  2021    COLONOSCOPY W/ POLYPECTOMY  2014    ESOPHAGOGASTRODUODENOSCOPY  2014    HYSTERECTOMY  2013    LUMBAR FUSION  2022    L4, L5, and S1    MR HEAD ANGIO WO IV CONTRAST  04/24/2015    MR HEAD ANGIO WO IV CONTRAST 4/24/2015 [facility]    MR NECK ANGIO WO IV CONTRAST  04/24/2015    MR NECK ANGIO WO IV CONTRAST 4/24/2015 [facility]    OTHER SURGICAL HISTORY  2024    Insertion spinal cord stimulator trial    THYROIDECTOMY Bilateral 08/21/2024    UPPER GASTROINTESTINAL ENDOSCOPY  1990    WISDOM TOOTH EXTRACTION  1971     Family History   Problem Relation Name Age of Onset    Cancer Mother Janet Young     Hypertension Mother Janet Young     Anesthesia problems Mother Janet Young     Anesthesia related problems Mother Janet Young     Diabetes Father Jim Young     Stroke Father Jim Young        CURRENT MEDICATIONS  Current Outpatient Medications   Medication Sig Dispense Refill    atorvastatin (Lipitor) 20 mg tablet Take 1 tablet (20 mg) by mouth once daily. as directed 90 tablet 3    b complex 0.4 mg tablet Take 1 tablet by mouth once daily.      cholecalciferol (Vitamin D-3) 50 MCG (2000 UT) tablet Take 1 tablet (50 mcg) by mouth once daily.      ibuprofen 200 mg tablet Take by mouth every 6 hours if needed for mild pain (1 - 3).      levothyroxine (Synthroid, Levoxyl) 100 mcg tablet Take 1 tablet (100 mcg) by mouth early in the morning.. 90 tablet 3    lisinopril 20 mg tablet Take 1 tablet (20 mg) by mouth once daily. as directed  90 tablet 3    magnesium oxide (Mag-Ox) 400 mg tablet 1 tablet (400 mg) once daily.      omeprazole (PriLOSEC) 40 mg DR capsule Take 1 capsule (40 mg) by mouth once daily in the morning. Take before meals. 90 capsule 3    Pain Reliever, acetaminophen, 500 mg tablet TAKE 1 TABLET BY MOUTH EVERY 4 TO 6 HOURS AS NEEDED      wheat dextrin (BENEFIBER CLEAR SF, DEXTRIN, ORAL) Take by mouth.       No current facility-administered medications for this visit.       ALLERGIES AND DRUG REACTIONS  Allergies   Allergen Reactions    Baclofen Unknown    Gabapentin Other     Memory loss          OBJECTIVE  Visit Vitals  /70   Pulse 81   SpO2 97%   OB Status Postmenopausal   Smoking Status Never       Last Recorded Pain Score (if available):         Pain Score:   4     Physical Exam  Vitals and nursing note reviewed.     General: Sitting in chair, NAD  Head: NCAT  Eyes: Sclera/conjunctiva clear, EOMI, PERRL  Nose/mouth: MMM  CV: Good distal pulses  Lungs: Good/equal chest excursion  Abdomen: Soft, ND  Ext: No cyanosis/edema  MSK: L-spine: unremarkable alignment, BL paraspinal m TTP, L-spine ROM: extension/flexion lmtd by pain    Neuro: AAOx3, Cn grossly nl    Psych: affect nl  Skin: no rash/lesions, SCS wound incisions well-healed with NTTP, no swelling      REVIEW OF LABORATORY DATA  I have reviewed the following lab results:  WBC   Date Value Ref Range Status   11/21/2024 8.1 4.4 - 11.3 x10*3/uL Final     RBC   Date Value Ref Range Status   11/21/2024 4.47 4.00 - 5.20 x10*6/uL Final     Hemoglobin   Date Value Ref Range Status   11/21/2024 14.3 12.0 - 16.0 g/dL Final     Hematocrit   Date Value Ref Range Status   11/21/2024 43.1 36.0 - 46.0 % Final     MCV   Date Value Ref Range Status   11/21/2024 96 80 - 100 fL Final     MCH   Date Value Ref Range Status   11/21/2024 32.0 26.0 - 34.0 pg Final     MCHC   Date Value Ref Range Status   11/21/2024 33.2 32.0 - 36.0 g/dL Final     RDW   Date Value Ref Range Status   11/21/2024  12.4 11.5 - 14.5 % Final     Platelets   Date Value Ref Range Status   11/21/2024 198 150 - 450 x10*3/uL Final     Sodium   Date Value Ref Range Status   11/21/2024 140 136 - 145 mmol/L Final     Potassium   Date Value Ref Range Status   11/21/2024 4.2 3.5 - 5.3 mmol/L Final     Bicarbonate   Date Value Ref Range Status   11/21/2024 30 21 - 32 mmol/L Final     Urea Nitrogen   Date Value Ref Range Status   11/21/2024 17 6 - 23 mg/dL Final     Calcium   Date Value Ref Range Status   11/21/2024 9.4 8.6 - 10.3 mg/dL Final     Protime   Date Value Ref Range Status   07/30/2024 11.3 9.8 - 12.8 seconds Final     INR   Date Value Ref Range Status   07/30/2024 1.0 0.9 - 1.1 Final         REVIEW OF RADIOLOGY   I have reviewed the following:  Radiology Studies      CT c-spine 5/24/24:  No acute fracture or posttraumatic subluxation.      C2-3: No significant central canal or foraminal stenosis.  C3-4: No significant central canal or foraminal stenosis.  C4-5: Uncovertebral osteophytes are noted more on the right side  minimally narrowing the right neuroforamen C5-6: Disc osteophyte  complex indents the ventral thecal sac. Uncovertebral osteophytes  moderately narrow the right and minimally narrow the left  neuroforamen. C6-7: No significant central canal or foraminal  stenosis. C7-T1: Hypertrophic facet changes are noted minimally  narrowing the neuroforamina.      4 mL indistinct nodule right upper lung image 417 series 201 for  instance incompletely evaluated on the current exam and nonspecific.  1.6 cm hypodense nodule left thyroid lobe with chunky calcification  incidentally noted. The thyroid gland is otherwise heterogeneous in  appearance.          IMPRESSION:  Degenerative changes of the cervical spine as above described worst  at C5-6. If further evaluation of the central canal and neuroforamina  were clinically necessary MRI could be considered.          MRI L-spine 5/23/24:  This report assumes 5 non-rib bearing  lumbar vertebral bodies. The  lowest intervertebral disc will be labeled L5-S1.      There are new postoperative changes from right laminectomy at L4-L5  with posterior spinal fusion including placement of bilateral  pedicular screws which terminate within the L4 and L5 vertebral  bodies and are interconnected by parallel rods. There are also  intervertebral disc spacers at L4-L5 and L5-S1. Per clinical note,  patient underwent left L5-S1 laminectomy which is partially  visualized. Current study is not tailored to assess the surgical  hardware.      Grade 1 anterolisthesis at L4-L5 and L5-S1 seen on the prior MRI has  decreased. Otherwise, there is no striking spondylolisthesis.  Vertebral body and remaining intervertebral disc heights are  maintained. There are chronic degenerative endplate changes at few  levels including mild endplate scalloping. Marrow signal is overall  within normal limits. There is a hemangioma within the L1 vertebral  body.      The conus medullaris terminates at the level of L1-L2 and is  unremarkable in appearance.      At T12-L1 and L1-L2, there is no posterior disc contour abnormality.  There is no spinal canal stenosis or neural foraminal narrowing.  There is no facet osteoarthropathy.      At L2-L3, there is a stable small diffuse disc bulge. There is no  spinal canal stenosis or neural foraminal narrowing. There is no  striking facet osteoarthropathy.      At L3-L4, there is a small diffuse disc bulge with a central annular  fissure which causes mild spinal canal stenosis. There is no neural  foraminal narrowing. There is no facet osteoarthropathy.      At L4-L5, there is no posterior disc contour abnormality. There is no  spinal canal stenosis or neural foraminal narrowing. It is somewhat  difficult to evaluate the facet joints due to susceptibility  artifact, noting this, bilateral facet osteoarthropathy has  decreased, likely sequela of prior facetectomy.      At L5-S1, there is no  posterior disc contour abnormality. There is no  spinal canal stenosis or striking neural foraminal narrowing. Partial  visualization of left laminectomy. Bilateral facet osteoarthropathy  has decreased, possibly sequela of recent surgery.      IMPRESSION:  1. Postoperative changes from laminectomies and spinal fusion at  L4-L5 and L5-S1 with resolution of spinal canal stenosis L4-L5.      2. Otherwise, essentially stable multilevel degenerative changes as  detailed above.             XR L-spine 12/78/23:  Laminectomy posterior fusion L4 through S1. Kyphoplasty changes at  the L4 and S1 level. There is mild anterolisthesis L4-L5 and L5 on  S1, similar to the prior. The remainder of the lumbar spine is  unremarkable      IMPRESSION:  Postsurgical changes L4-S1 without evidence of hardware failure or  malalignment.         ASSESSMENT & PLAN  Alana Ashby is a 69 y.o. female with PMH HTN, GERD, obesity who presents for F/U LB pain     1) Lumbar PLS  -s/p L4-S1 fusion 2022 worsening since 2023 with subj RLE numbness  -Refractive to  >1 y conservative tx including Tylenol, NSAIDs, >6 w PT, gabapentin, CHRITSIANA  -Saw Dr Clements 12/8 where no additional surgery was rec'd  -MRI L-spine 5/23/24: L4-S1 lami/fusion stable, multilevel spondylosis featuring L3-4 disc bulge/fissure contributing to mild stenosis  -[place] SCS implant 12/3/24: 80% ongoing relief. Improving surgical site soreness- well-healed otherwise. Complex reprogramming provided  -F/U 3 mo          Today's visit involved continuation of chronic pain care. In the context of the complexity of this patient's chronic pain diagnosis, long-term expectations and care planning discussed. Adequate time taken to ensure patient understanding and answer questions. Imaging studies ordered are placed do elucidate the patient's diagnosis, but also to evaluate the patient's candidacy for procedural and surgical interventions. The risks and benefits of these potential interventions  are detailed as above.                      Lj Delcid MD  Anesthesiologist & Interventional Pain Physician   [facility]  O: [phone]  F: [phone]  10:32 AM  01/13/25

## 2025-03-31 NOTE — PROGRESS NOTES
"Subjective   Patient ID: Alana Ashby is a 69 y.o. female who presents for Medicare Annual Wellness Visit Subsequent (Saw a few things on MyChart she has questions about; in Dec had spinal cord stimulator; had thyroid removed).     Thumb pain: better lately, never saw ortho about injections. Notes her pain fluctuates with activity level.     Thyroid papillary cancer, post-surgical hypothyroidism: She had thyroidectomy about a year ago, found incidentally on neck imaging. She is on replacement. Following with Dr. Mitchell.      Low back pain: She had surgery in Dec 2022, her pain changed but in some ways it is worse. She had injections ith pain management but that didn't help much. Ultimately she was given a spinal stimulator to help with breaking up the pain cycle. She is able to function a lot better. She is still careful about her acitivty level. She got injections in the neck as well but those didn't help much either. Dealing with more neck pain for the last week. She is taking a blend of ibuprofen and tylenol depending on the type of pain that she has.      Epigastric pain: Positional. She has had it for a few years but it has gotten worse in the last 6 mo. Comes and goes. When it comes it doesn't last long. Sometimes she feels a bulge and doesn't feel much discomfort.      HTN: On lisinopril, no SE's.     GERD: Controlled on omeprazole. SHe only takes it prn at this point.      HLD: On simvastatin, she has been out recently.      All other systems have been reviewed and are negative for complaint     Objective   /82 (BP Location: Left arm, Patient Position: Sitting, BP Cuff Size: Large adult)   Pulse 91   Ht 1.753 m (5' 9\")   Wt 102 kg (224 lb)   BMI 33.08 kg/m²     Gen: No acute distress, alert and oriented x3, pleasant   HEENT: moist mucous membranes, b/l external auditory canals are clear of debris, TMs within normal limits, no oropharyngeal lesions, eomi, perrla   Neck: thyroid within normal limits, " no lymphadenopathy   CV: RRR, normal S1/S2, no murmur   Resp: Clear to auscultation bilaterally, no wheezes or rhonchi appreciated  Abd: soft, nontender, non-distended, no guarding/rigidity, bowel sounds present  Extr: no edema, no calf tenderness  Derm: Skin is warm and dry, no rashes appreciated  Psych: mood is good, affect is congruent, good hygiene, normal speech and eye contact  Neuro: cranial nerves grossly intact, normal gait    Patient Health Questionnaire-2 Score: 0 (4/1/2025 10:56 AM)        Assessment/Plan      #Fatigue  Check labs    #Papillary thyroid cancer  Doing well after thyroidectomy    #Hypothyroidism  Post-surgical  On replacement  Following with endocrine     #Low back pain:  Doing ok after spinal stimulator was placed  Handicap placard given     #Epigastric pain  likely ventral hernia  discussed supportive care     #CKD:  Monitoring     #HTN  Controlled on lisinopril     #HLD  Controlled on simvastatin  ordering lipid     #GERD:  on omeprazole daily     HCM:  Mammogram Oct WNL  No need for pap smear due to hysterectomy  C-scope 2020, next due 2025  Declining flu, PNA vaccine

## 2025-04-01 ENCOUNTER — APPOINTMENT (OUTPATIENT)
Dept: PRIMARY CARE | Facility: CLINIC | Age: 70
End: 2025-04-01
Payer: MEDICARE

## 2025-04-01 VITALS
HEART RATE: 91 BPM | WEIGHT: 224 LBS | SYSTOLIC BLOOD PRESSURE: 137 MMHG | BODY MASS INDEX: 33.18 KG/M2 | DIASTOLIC BLOOD PRESSURE: 85 MMHG | HEIGHT: 69 IN

## 2025-04-01 DIAGNOSIS — D64.9 FATIGUE ASSOCIATED WITH ANEMIA: ICD-10-CM

## 2025-04-01 DIAGNOSIS — Z12.11 ENCOUNTER FOR SCREENING COLONOSCOPY: Primary | ICD-10-CM

## 2025-04-01 DIAGNOSIS — I10 PRIMARY HYPERTENSION: ICD-10-CM

## 2025-04-01 DIAGNOSIS — E78.2 MIXED HYPERLIPIDEMIA: ICD-10-CM

## 2025-04-01 DIAGNOSIS — M96.1 LUMBAR POST-LAMINECTOMY SYNDROME: ICD-10-CM

## 2025-04-01 DIAGNOSIS — R53.83 OTHER FATIGUE: ICD-10-CM

## 2025-04-01 PROCEDURE — 3075F SYST BP GE 130 - 139MM HG: CPT | Performed by: FAMILY MEDICINE

## 2025-04-01 PROCEDURE — G0439 PPPS, SUBSEQ VISIT: HCPCS | Performed by: FAMILY MEDICINE

## 2025-04-01 PROCEDURE — 1124F ACP DISCUSS-NO DSCNMKR DOCD: CPT | Performed by: FAMILY MEDICINE

## 2025-04-01 PROCEDURE — 3079F DIAST BP 80-89 MM HG: CPT | Performed by: FAMILY MEDICINE

## 2025-04-01 PROCEDURE — 1170F FXNL STATUS ASSESSED: CPT | Performed by: FAMILY MEDICINE

## 2025-04-01 PROCEDURE — 3008F BODY MASS INDEX DOCD: CPT | Performed by: FAMILY MEDICINE

## 2025-04-01 PROCEDURE — 1159F MED LIST DOCD IN RCRD: CPT | Performed by: FAMILY MEDICINE

## 2025-04-01 PROCEDURE — 1036F TOBACCO NON-USER: CPT | Performed by: FAMILY MEDICINE

## 2025-04-01 ASSESSMENT — ACTIVITIES OF DAILY LIVING (ADL)
MANAGING_FINANCES: INDEPENDENT
DRESSING: INDEPENDENT
DOING_HOUSEWORK: INDEPENDENT
BATHING: INDEPENDENT
GROCERY_SHOPPING: INDEPENDENT
TAKING_MEDICATION: INDEPENDENT

## 2025-04-07 ENCOUNTER — OFFICE VISIT (OUTPATIENT)
Dept: PAIN MEDICINE | Facility: CLINIC | Age: 70
End: 2025-04-07
Payer: MEDICARE

## 2025-04-07 VITALS — DIASTOLIC BLOOD PRESSURE: 74 MMHG | SYSTOLIC BLOOD PRESSURE: 132 MMHG | HEART RATE: 81 BPM | OXYGEN SATURATION: 99 %

## 2025-04-07 DIAGNOSIS — M96.1 LUMBAR POST-LAMINECTOMY SYNDROME: Primary | ICD-10-CM

## 2025-04-07 DIAGNOSIS — M54.12 CERVICAL RADICULITIS: ICD-10-CM

## 2025-04-07 PROCEDURE — 3075F SYST BP GE 130 - 139MM HG: CPT | Performed by: ANESTHESIOLOGY

## 2025-04-07 PROCEDURE — 1125F AMNT PAIN NOTED PAIN PRSNT: CPT | Performed by: ANESTHESIOLOGY

## 2025-04-07 PROCEDURE — 99213 OFFICE O/P EST LOW 20 MIN: CPT | Performed by: ANESTHESIOLOGY

## 2025-04-07 PROCEDURE — G2211 COMPLEX E/M VISIT ADD ON: HCPCS | Performed by: ANESTHESIOLOGY

## 2025-04-07 PROCEDURE — 3078F DIAST BP <80 MM HG: CPT | Performed by: ANESTHESIOLOGY

## 2025-04-07 PROCEDURE — 1159F MED LIST DOCD IN RCRD: CPT | Performed by: ANESTHESIOLOGY

## 2025-04-07 PROCEDURE — 1160F RVW MEDS BY RX/DR IN RCRD: CPT | Performed by: ANESTHESIOLOGY

## 2025-04-07 ASSESSMENT — PAIN - FUNCTIONAL ASSESSMENT: PAIN_FUNCTIONAL_ASSESSMENT: 0-10

## 2025-04-07 ASSESSMENT — ENCOUNTER SYMPTOMS
DEPRESSION: 0
HEMATOLOGIC/LYMPHATIC NEGATIVE: 1
PSYCHIATRIC NEGATIVE: 1
EYES NEGATIVE: 1
NUMBNESS: 1
LOSS OF SENSATION IN FEET: 0
GASTROINTESTINAL NEGATIVE: 1
CARDIOVASCULAR NEGATIVE: 1
RESPIRATORY NEGATIVE: 1
ENDOCRINE NEGATIVE: 1
BACK PAIN: 1
CONSTITUTIONAL NEGATIVE: 1
OCCASIONAL FEELINGS OF UNSTEADINESS: 0

## 2025-04-07 ASSESSMENT — PATIENT HEALTH QUESTIONNAIRE - PHQ9
2. FEELING DOWN, DEPRESSED OR HOPELESS: NOT AT ALL
1. LITTLE INTEREST OR PLEASURE IN DOING THINGS: NOT AT ALL
SUM OF ALL RESPONSES TO PHQ9 QUESTIONS 1 AND 2: 0

## 2025-04-07 ASSESSMENT — PAIN DESCRIPTION - DESCRIPTORS: DESCRIPTORS: BURNING

## 2025-04-07 ASSESSMENT — PAIN SCALES - GENERAL
PAINLEVEL_OUTOF10: 6
PAINLEVEL_OUTOF10: 6

## 2025-04-07 NOTE — PROGRESS NOTES
PAIN MANAGEMENT FOLLOW-UP OFFICE NOTE    Date of Service: 4/7/2025    SUBJECTIVE    CHIEF COMPLAINT: LBP    HISTORY OF PRESENT ILLNESS    Alana Ashby is a 69 y.o. female with PMH HTN, GERD, obesity who presents for F/U LB pain.    Since her last visit, pt feels neck pain worsening and BL hands, but attributes this to weather. She would like to hold off on intervening on these issues for now. In the meantime, pt feels she has 70% sustained relief LBP, 100% relief RLE.     Pt denies new-onset weakness, bowel/bladder incontinence.  Pt denies recent infection, allergy to Latex/iodine/contrast. Patient is currently taking the following blood thinner(s): N/A    Procedure log:  -[place] SCS implant 12/3/24: 80% ongoing relief  -[place] SCS trial 10/17/24: 60% relief  -QUINTIN 6/25/24: 85% relief ongoing  -Caudal CHRISTIANA 6/11/24: 85% relief 1 mo, 75% 3 mo      REVIEW OF SYSTEMS  Review of Systems   Constitutional: Negative.    HENT: Negative.     Eyes: Negative.    Respiratory: Negative.     Cardiovascular: Negative.    Gastrointestinal: Negative.    Endocrine: Negative.    Musculoskeletal:  Positive for back pain and gait problem.   Skin: Negative.    Neurological:  Positive for numbness.   Hematological: Negative.    Psychiatric/Behavioral: Negative.         PAST MEDICAL HISTORY  Past Medical History:   Diagnosis Date    Abdominal mass 06/30/2023    Anemia 1990?    Arthritis 1980?    Chronic pain disorder 1970?    Colon polyp ?2017    Disease of thyroid gland 2024    Diverticulitis of colon ?2017    Diverticulosis 2017?    Elbow pain 06/30/2023    Encounter for screening for malignant neoplasm of colon 03/26/2014    Encounter for screening colonoscopy    Endometriosis 1991    Epigastric pain 06/30/2023    Fibrocystic breast ?2020    GERD (gastroesophageal reflux disease) 1990?    Hiatal hernia 2014?    Hyperlipidemia     Hypertension 1990?    Knee pain 06/30/2023    Low back pain 1970 ?    Neck pain 2019 ?    Other specified  diseases of gallbladder 06/26/2014    Biliary dyskinesia    Personal history of other diseases of the digestive system     History of esophageal reflux    Personal history of other specified conditions 06/04/2014    History of nausea    PONV (postoperative nausea and vomiting) ?1991    Also as recent as 12/22/2022    Right upper quadrant pain 07/10/2014    Abdominal pain, RUQ    Thumb pain 06/30/2023    Thyroid cancer (Multi) O7/2024    Thyroid nodule 6/2024    Varicella 1958?    Vitamin D deficiency 2014?     Past Surgical History:   Procedure Laterality Date    BACK SURGERY  12/21/2022    CHOLECYSTECTOMY  2014    COLONOSCOPY  2021    COLONOSCOPY W/ POLYPECTOMY  2014    ESOPHAGOGASTRODUODENOSCOPY  2014    HYSTERECTOMY  2013    LUMBAR FUSION  2022    L4, L5, and S1    LYMPH NODE BIOPSY  9/2024    MR HEAD ANGIO WO IV CONTRAST  04/24/2015    MR HEAD ANGIO WO IV CONTRAST 4/24/2015 [facility]    MR NECK ANGIO WO IV CONTRAST  04/24/2015    MR NECK ANGIO WO IV CONTRAST 4/24/2015 [facility]    OTHER SURGICAL HISTORY  2024    Insertion spinal cord stimulator trial    THYROIDECTOMY Bilateral 08/21/2024    UPPER GASTROINTESTINAL ENDOSCOPY  1990    WISDOM TOOTH EXTRACTION  1971     Family History   Problem Relation Name Age of Onset    Cancer Mother Janet Young 72    Hypertension Mother Janet Young     Anesthesia problems Mother Janet Young     Anesthesia related problems Mother Janet Young     Diabetes Father Jim Young     Stroke Father Jim Young     Migraines Father Jim Young        CURRENT MEDICATIONS  Current Outpatient Medications   Medication Sig Dispense Refill    atorvastatin (Lipitor) 20 mg tablet Take 1 tablet (20 mg) by mouth once daily. as directed 90 tablet 3    b complex 0.4 mg tablet Take 1 tablet by mouth once daily.      cholecalciferol (Vitamin D-3) 50 MCG (2000 UT) tablet Take 1 tablet (50 mcg) by mouth once daily.      ibuprofen 200 mg tablet Take by mouth every 6 hours  if needed for mild pain (1 - 3).      levothyroxine (Synthroid, Levoxyl) 100 mcg tablet Take 1 tablet (100 mcg) by mouth early in the morning.. 90 tablet 3    lisinopril 20 mg tablet Take 1 tablet (20 mg) by mouth once daily. as directed 90 tablet 3    magnesium oxide (Mag-Ox) 400 mg tablet 1 tablet (400 mg) once daily.      omeprazole (PriLOSEC) 40 mg DR capsule Take 1 capsule (40 mg) by mouth once daily in the morning. Take before meals. 90 capsule 3    Pain Reliever, acetaminophen, 500 mg tablet TAKE 1 TABLET BY MOUTH EVERY 4 TO 6 HOURS AS NEEDED      wheat dextrin (BENEFIBER CLEAR SF, DEXTRIN, ORAL) Take by mouth.       No current facility-administered medications for this visit.       ALLERGIES AND DRUG REACTIONS  Allergies   Allergen Reactions    Baclofen Unknown    Gabapentin Other     Memory loss          OBJECTIVE  Visit Vitals  /74   Pulse 81   SpO2 99%   OB Status Postmenopausal   Smoking Status Never       Last Recorded Pain Score (if available):         Pain Score:   6     Physical Exam  Vitals and nursing note reviewed.       General: Sitting in chair, NAD  Head: NCAT  Eyes: Sclera/conjunctiva clear, EOMI, PERRL  Nose/mouth: MMM  CV: Good distal pulses  Lungs: Good/equal chest excursion  Abdomen: Soft, ND  Ext: No cyanosis/edema  MSK: c-spine: anterior tilt. BL paraspinal m TTP. Full rom w/ achy pain. Neg Spurling, Lhermitte      Neuro: AAOx3, Cn grossly nl  Dermatome sensation to light touch  LEFT C5: WNL    RIGHT C5: WNL      LEFT C6: WNL       RIGHT C6: WNL      LEFT C7: WNL       RIGHT C7: WNL      LEFT C8: WNL       RIGHT C8: WNL      LEFT T1: WNL       RIGHT T1: WNL    Motor strength  LEFT C5 (elbow flexion): 5/5   RIGHT C5: 5/5  LEFT C6 (wrist extension): 5/5     RIGHT C6: 5/5  LEFT C7 (elbow extension): 5/5     RIGHT C7: 5/5  LEFT C8 (finger abduction): 5/5     RIGHT C8: 5/5  LEFT T1 (hand ): 5/5     RIGHT T1: 5/5        Special testing  Silva: neg BL        Psych: affect nl  Skin:  no rash/lesions      REVIEW OF LABORATORY DATA  I have reviewed the following lab results:  WBC   Date Value Ref Range Status   11/21/2024 8.1 4.4 - 11.3 x10*3/uL Final     RBC   Date Value Ref Range Status   11/21/2024 4.47 4.00 - 5.20 x10*6/uL Final     Hemoglobin   Date Value Ref Range Status   11/21/2024 14.3 12.0 - 16.0 g/dL Final     Hematocrit   Date Value Ref Range Status   11/21/2024 43.1 36.0 - 46.0 % Final     MCV   Date Value Ref Range Status   11/21/2024 96 80 - 100 fL Final     MCH   Date Value Ref Range Status   11/21/2024 32.0 26.0 - 34.0 pg Final     MCHC   Date Value Ref Range Status   11/21/2024 33.2 32.0 - 36.0 g/dL Final     RDW   Date Value Ref Range Status   11/21/2024 12.4 11.5 - 14.5 % Final     Platelets   Date Value Ref Range Status   11/21/2024 198 150 - 450 x10*3/uL Final     Sodium   Date Value Ref Range Status   11/21/2024 140 136 - 145 mmol/L Final     Potassium   Date Value Ref Range Status   11/21/2024 4.2 3.5 - 5.3 mmol/L Final     Bicarbonate   Date Value Ref Range Status   11/21/2024 30 21 - 32 mmol/L Final     Urea Nitrogen   Date Value Ref Range Status   11/21/2024 17 6 - 23 mg/dL Final     Calcium   Date Value Ref Range Status   11/21/2024 9.4 8.6 - 10.3 mg/dL Final     Protime   Date Value Ref Range Status   07/30/2024 11.3 9.8 - 12.8 seconds Final     INR   Date Value Ref Range Status   07/30/2024 1.0 0.9 - 1.1 Final         REVIEW OF RADIOLOGY   I have reviewed the following:  Radiology Studies      CT c-spine 5/24/24:  No acute fracture or posttraumatic subluxation.      C2-3: No significant central canal or foraminal stenosis.  C3-4: No significant central canal or foraminal stenosis.  C4-5: Uncovertebral osteophytes are noted more on the right side  minimally narrowing the right neuroforamen C5-6: Disc osteophyte  complex indents the ventral thecal sac. Uncovertebral osteophytes  moderately narrow the right and minimally narrow the left  neuroforamen. C6-7: No  significant central canal or foraminal  stenosis. C7-T1: Hypertrophic facet changes are noted minimally  narrowing the neuroforamina.      4 mL indistinct nodule right upper lung image 417 series 201 for  instance incompletely evaluated on the current exam and nonspecific.  1.6 cm hypodense nodule left thyroid lobe with chunky calcification  incidentally noted. The thyroid gland is otherwise heterogeneous in  appearance.          IMPRESSION:  Degenerative changes of the cervical spine as above described worst  at C5-6. If further evaluation of the central canal and neuroforamina  were clinically necessary MRI could be considered.          MRI L-spine 5/23/24:  This report assumes 5 non-rib bearing lumbar vertebral bodies. The  lowest intervertebral disc will be labeled L5-S1.      There are new postoperative changes from right laminectomy at L4-L5  with posterior spinal fusion including placement of bilateral  pedicular screws which terminate within the L4 and L5 vertebral  bodies and are interconnected by parallel rods. There are also  intervertebral disc spacers at L4-L5 and L5-S1. Per clinical note,  patient underwent left L5-S1 laminectomy which is partially  visualized. Current study is not tailored to assess the surgical  hardware.      Grade 1 anterolisthesis at L4-L5 and L5-S1 seen on the prior MRI has  decreased. Otherwise, there is no striking spondylolisthesis.  Vertebral body and remaining intervertebral disc heights are  maintained. There are chronic degenerative endplate changes at few  levels including mild endplate scalloping. Marrow signal is overall  within normal limits. There is a hemangioma within the L1 vertebral  body.      The conus medullaris terminates at the level of L1-L2 and is  unremarkable in appearance.      At T12-L1 and L1-L2, there is no posterior disc contour abnormality.  There is no spinal canal stenosis or neural foraminal narrowing.  There is no facet osteoarthropathy.      At  L2-L3, there is a stable small diffuse disc bulge. There is no  spinal canal stenosis or neural foraminal narrowing. There is no  striking facet osteoarthropathy.      At L3-L4, there is a small diffuse disc bulge with a central annular  fissure which causes mild spinal canal stenosis. There is no neural  foraminal narrowing. There is no facet osteoarthropathy.      At L4-L5, there is no posterior disc contour abnormality. There is no  spinal canal stenosis or neural foraminal narrowing. It is somewhat  difficult to evaluate the facet joints due to susceptibility  artifact, noting this, bilateral facet osteoarthropathy has  decreased, likely sequela of prior facetectomy.      At L5-S1, there is no posterior disc contour abnormality. There is no  spinal canal stenosis or striking neural foraminal narrowing. Partial  visualization of left laminectomy. Bilateral facet osteoarthropathy  has decreased, possibly sequela of recent surgery.      IMPRESSION:  1. Postoperative changes from laminectomies and spinal fusion at  L4-L5 and L5-S1 with resolution of spinal canal stenosis L4-L5.      2. Otherwise, essentially stable multilevel degenerative changes as  detailed above.             XR L-spine 12/78/23:  Laminectomy posterior fusion L4 through S1. Kyphoplasty changes at  the L4 and S1 level. There is mild anterolisthesis L4-L5 and L5 on  S1, similar to the prior. The remainder of the lumbar spine is  unremarkable      IMPRESSION:  Postsurgical changes L4-S1 without evidence of hardware failure or  malalignment.         ASSESSMENT & PLAN  Alana Ashby is a 69 y.o. female with PMH HTN, GERD, obesity who presents for F/U LB pain     1) Lumbar PLS  -s/p L4-S1 fusion 2022 worsening since 2023 with subj RLE numbness  -Refractive to  >1 y conservative tx including Tylenol, NSAIDs, >6 w PT, gabapentin, CHRISTIANA  -Saw Dr Clements 12/8 where no additional surgery was rec'd  -MRI L-spine 5/23/24: L4-S1 lami/fusion stable, multilevel  spondylosis featuring L3-4 disc bulge/fissure contributing to mild stenosis  -[place] SCS implant 12/3/24: 80% relief- 70% sustained relief LBP, 100% relief RLE.     2) Cervical radic  -Since slip and fall 2016 radiating to BL shoulders with +Spurling, occasional R hand numbness  -Refractive to yrs of conservative tx including heat, Tylenol, NSAIDs, >6 w home exercise program  -CT C-spine 5/24/24: multilevel spondylosis featuring C5-6 disc complex indenting dura with moderate R>L NF stenosis  -QUINTIN 6/25/24: 85% relief ongoing except in last few days  -Discussed repeat QUINTIN, but pt declined at this time  -F/U PRN        Today's visit involved continuation of chronic pain care. In the context of the complexity of this patient's chronic pain diagnosis, long-term expectations and care planning discussed. Adequate time taken to ensure patient understanding and answer questions. Imaging studies ordered are placed do elucidate the patient's diagnosis, but also to evaluate the patient's candidacy for procedural and surgical interventions. The risks and benefits of these potential interventions are detailed as above.                      Lj Delcid MD  Anesthesiologist & Interventional Pain Physician   [facility]  O: [phone]  F: [phone]  11:18 AM  04/07/25

## 2025-06-28 LAB
ALBUMIN SERPL-MCNC: 4.3 G/DL (ref 3.6–5.1)
ALP SERPL-CCNC: 60 U/L (ref 37–153)
ALT SERPL-CCNC: 23 U/L (ref 6–29)
ANION GAP SERPL CALCULATED.4IONS-SCNC: 9 MMOL/L (CALC) (ref 7–17)
AST SERPL-CCNC: 23 U/L (ref 10–35)
BASOPHILS # BLD AUTO: 41 CELLS/UL (ref 0–200)
BASOPHILS NFR BLD AUTO: 0.9 %
BILIRUB SERPL-MCNC: 0.5 MG/DL (ref 0.2–1.2)
BUN SERPL-MCNC: 21 MG/DL (ref 7–25)
CALCIUM SERPL-MCNC: 9 MG/DL (ref 8.6–10.4)
CHLORIDE SERPL-SCNC: 105 MMOL/L (ref 98–110)
CHOLEST SERPL-MCNC: 161 MG/DL
CHOLEST/HDLC SERPL: 3 (CALC)
CO2 SERPL-SCNC: 27 MMOL/L (ref 20–32)
CREAT SERPL-MCNC: 0.98 MG/DL (ref 0.5–1.05)
EGFRCR SERPLBLD CKD-EPI 2021: 62 ML/MIN/1.73M2
EOSINOPHIL # BLD AUTO: 221 CELLS/UL (ref 15–500)
EOSINOPHIL NFR BLD AUTO: 4.8 %
ERYTHROCYTE [DISTWIDTH] IN BLOOD BY AUTOMATED COUNT: 13.7 % (ref 11–15)
FERRITIN SERPL-MCNC: 72 NG/ML (ref 16–288)
GLUCOSE SERPL-MCNC: 99 MG/DL (ref 65–99)
HCT VFR BLD AUTO: 42 % (ref 35–45)
HDLC SERPL-MCNC: 53 MG/DL
HGB BLD-MCNC: 13.3 G/DL (ref 11.7–15.5)
IRON SERPL-MCNC: 83 MCG/DL (ref 45–160)
LDLC SERPL CALC-MCNC: 87 MG/DL (CALC)
LYMPHOCYTES # BLD AUTO: 1532 CELLS/UL (ref 850–3900)
LYMPHOCYTES NFR BLD AUTO: 33.3 %
MCH RBC QN AUTO: 31.1 PG (ref 27–33)
MCHC RBC AUTO-ENTMCNC: 31.7 G/DL (ref 32–36)
MCV RBC AUTO: 98.1 FL (ref 80–100)
MONOCYTES # BLD AUTO: 336 CELLS/UL (ref 200–950)
MONOCYTES NFR BLD AUTO: 7.3 %
NEUTROPHILS # BLD AUTO: 2470 CELLS/UL (ref 1500–7800)
NEUTROPHILS NFR BLD AUTO: 53.7 %
NONHDLC SERPL-MCNC: 108 MG/DL (CALC)
PLATELET # BLD AUTO: 171 THOUSAND/UL (ref 140–400)
PMV BLD REES-ECKER: 9.8 FL (ref 7.5–12.5)
POTASSIUM SERPL-SCNC: 4.6 MMOL/L (ref 3.5–5.3)
PROT SERPL-MCNC: 6.7 G/DL (ref 6.1–8.1)
RBC # BLD AUTO: 4.28 MILLION/UL (ref 3.8–5.1)
SODIUM SERPL-SCNC: 141 MMOL/L (ref 135–146)
THYROGLOB AB SERPL-ACNC: NORMAL [IU]/ML
TRIGL SERPL-MCNC: 114 MG/DL
TSH SERPL-ACNC: 0.23 MIU/L (ref 0.4–4.5)
VIT B12 SERPL-MCNC: 542 PG/ML (ref 200–1100)
WBC # BLD AUTO: 4.6 THOUSAND/UL (ref 3.8–10.8)

## 2025-07-01 ENCOUNTER — APPOINTMENT (OUTPATIENT)
Dept: RADIOLOGY | Facility: HOSPITAL | Age: 70
End: 2025-07-01
Payer: MEDICARE

## 2025-07-01 DIAGNOSIS — C73 THYROID CANCER (MULTI): ICD-10-CM

## 2025-07-01 LAB
THYROGLOB AB SERPL-ACNC: <1 IU/ML
THYROGLOB SERPL-MCNC: <0.1 NG/ML
TSH SERPL-ACNC: 0.23 MIU/L (ref 0.4–4.5)

## 2025-07-01 PROCEDURE — 76536 US EXAM OF HEAD AND NECK: CPT

## 2025-07-01 PROCEDURE — 76536 US EXAM OF HEAD AND NECK: CPT | Performed by: STUDENT IN AN ORGANIZED HEALTH CARE EDUCATION/TRAINING PROGRAM

## 2025-07-15 ENCOUNTER — APPOINTMENT (OUTPATIENT)
Dept: ENDOCRINOLOGY | Facility: CLINIC | Age: 70
End: 2025-07-15
Payer: MEDICARE

## 2025-07-15 VITALS
HEART RATE: 80 BPM | DIASTOLIC BLOOD PRESSURE: 80 MMHG | SYSTOLIC BLOOD PRESSURE: 121 MMHG | WEIGHT: 221 LBS | BODY MASS INDEX: 32.64 KG/M2

## 2025-07-15 DIAGNOSIS — C73 THYROID CANCER (MULTI): Primary | ICD-10-CM

## 2025-07-15 DIAGNOSIS — E03.9 HYPOTHYROIDISM, UNSPECIFIED TYPE: ICD-10-CM

## 2025-07-15 PROCEDURE — 1159F MED LIST DOCD IN RCRD: CPT | Performed by: INTERNAL MEDICINE

## 2025-07-15 PROCEDURE — 99214 OFFICE O/P EST MOD 30 MIN: CPT | Performed by: INTERNAL MEDICINE

## 2025-07-15 PROCEDURE — G2211 COMPLEX E/M VISIT ADD ON: HCPCS | Performed by: INTERNAL MEDICINE

## 2025-07-15 PROCEDURE — 1160F RVW MEDS BY RX/DR IN RCRD: CPT | Performed by: INTERNAL MEDICINE

## 2025-07-15 PROCEDURE — 3074F SYST BP LT 130 MM HG: CPT | Performed by: INTERNAL MEDICINE

## 2025-07-15 PROCEDURE — 3079F DIAST BP 80-89 MM HG: CPT | Performed by: INTERNAL MEDICINE

## 2025-07-15 RX ORDER — LEVOTHYROXINE SODIUM 100 UG/1
100 TABLET ORAL DAILY
Qty: 90 TABLET | Refills: 3 | Status: SHIPPED | OUTPATIENT
Start: 2025-07-15 | End: 2026-07-15

## 2025-07-15 ASSESSMENT — ENCOUNTER SYMPTOMS
CONSTIPATION: 0
VOMITING: 0
PALPITATIONS: 0
NAUSEA: 0
BACK PAIN: 1
TROUBLE SWALLOWING: 1
WEAKNESS: 0
ABDOMINAL PAIN: 0
NECK PAIN: 1
UNEXPECTED WEIGHT CHANGE: 0
SHORTNESS OF BREATH: 0
FEVER: 0
HEADACHES: 0
FATIGUE: 1
NERVOUS/ANXIOUS: 0
DIZZINESS: 1
TREMORS: 0
DIARRHEA: 0

## 2025-07-15 NOTE — PROGRESS NOTES
History Of Present Illness  Alana Ashby is a 69 y.o. female with a history of thyroid cancer.     Levothyroxine 100 mcg/day  Patient is taking levothyroxine on an empty stomach with water alone.     Thyroid cancer diagnosis date:  8/21/24       Type:  Papillary  Size of primary tumor:      2.2 cm, right lobe  Other foci:  0.9 cm right lobe  1.7 cm left lobe  1.3 cm left lobe  Extrathyroidal extension (ETE):  Negative  Lymph Nodes:        Distant Metastases:  None Known  Pathologic Staging:  pT2      N0      Mx.   BRAF V600E mutation positive left lobe nodules staining     Surgery:  Thyroidectomy (8/21/24)        Past Medical History  She has a past medical history of Abdominal mass (06/30/2023), Anemia (1990?), Arthritis (1980?), Chronic pain disorder (1970?), Colon polyp (?2017), Disease of thyroid gland (2024), Diverticulitis of colon (?2017), Diverticulosis (2017?), Elbow pain (06/30/2023), Encounter for screening for malignant neoplasm of colon (03/26/2014), Endometriosis (1991), Epigastric pain (06/30/2023), Fibrocystic breast (?2020), GERD (gastroesophageal reflux disease) (1990?), Hiatal hernia (2014?), Hyperlipidemia, Hypertension (1990?), Knee pain (06/30/2023), Low back pain (1970 ?), Neck pain (2019 ?), Other specified diseases of gallbladder (06/26/2014), Personal history of other diseases of the digestive system, Personal history of other specified conditions (06/04/2014), PONV (postoperative nausea and vomiting) (?1991), Right upper quadrant pain (07/10/2014), Thumb pain (06/30/2023), Thyroid cancer (Multi) (O7/2024), Thyroid nodule (6/2024), Varicella (1958?), and Vitamin D deficiency (2014?).    Surgical History  She has a past surgical history that includes Hysterectomy (2013); Esophagogastroduodenoscopy (2014); Cholecystectomy (2014); Colonoscopy w/ polypectomy (2014); MR angio neck wo IV contrast (04/24/2015); MR angio head wo IV contrast (04/24/2015); Thyroidectomy (Bilateral, 08/21/2024);  [place] tooth extraction (1971); Colonoscopy (2021); Upper gastrointestinal endoscopy (1990); Lumbar fusion (2022); Other surgical history (2024); Back surgery (12/21/2022); Lymph node biopsy (9/2024); Spinal fusion (12/21/2022); and Breast surgery.     Social History  She reports that she has never smoked. She has never been exposed to tobacco smoke. She has never used smokeless tobacco. She reports that she does not drink alcohol and does not use drugs.    Family History  Family History[1]    Medications  Current Outpatient Medications   Medication Instructions    atorvastatin (LIPITOR) 20 mg, oral, Daily, as directed    cholecalciferol (VITAMIN D-3) 50 mcg, Daily    ibuprofen 200 mg tablet Every 6 hours PRN    levothyroxine (SYNTHROID, LEVOXYL) 100 mcg, oral, Daily    lisinopril 20 mg, oral, Daily, as directed    magnesium oxide (MAG-OX) 400 mg, Daily    omeprazole (PRILOSEC) 40 mg, oral, Daily before breakfast    Pain Reliever, acetaminophen, 500 mg tablet TAKE 1 TABLET BY MOUTH EVERY 4 TO 6 HOURS AS NEEDED    wheat dextrin (BENEFIBER CLEAR SF, DEXTRIN, ORAL) Take by mouth.       Allergies  Baclofen and Gabapentin    Review of Systems   Constitutional:  Positive for fatigue. Negative for fever and unexpected weight change.   HENT:  Positive for trouble swallowing.    Eyes:  Negative for visual disturbance.   Respiratory:  Negative for shortness of breath.    Cardiovascular:  Negative for chest pain and palpitations.   Gastrointestinal:  Negative for abdominal pain, constipation, diarrhea, nausea and vomiting.   Endocrine: Negative for cold intolerance and heat intolerance.   Musculoskeletal:  Positive for back pain and neck pain.   Skin:  Negative for rash.   Neurological:  Positive for dizziness. Negative for tremors, weakness and headaches.   Psychiatric/Behavioral:  The patient is not nervous/anxious.          Last Recorded Vitals  Blood pressure 121/80, pulse 80, weight 100 kg (221 lb).    Physical  Exam  Constitutional:       General: She is not in acute distress.  HENT:      Head: Normocephalic.      Mouth/Throat:      Mouth: Mucous membranes are moist.   Eyes:      Extraocular Movements: Extraocular movements intact.   Neck:      Comments: Thyroidectomy scar, no palpable gland  Cardiovascular:      Pulses:           Radial pulses are 2+ on the right side and 2+ on the left side.   Musculoskeletal:      Right lower leg: No edema.      Left lower leg: No edema.   Lymphadenopathy:      Cervical: No cervical adenopathy.   Neurological:      Mental Status: She is alert.      Motor: No tremor.   Psychiatric:         Mood and Affect: Affect normal.          Relevant Results  Lab Results   Component Value Date    TSH 0.23 (L) 06/27/2025    THYROGLOBU <0.1 06/27/2025    THYROGLCMSMS Not Applicable 10/02/2024    THYROGLOBULI <1 06/27/2025         US NECK  7/1/2025 4:19 pm  INDICATION:  Signs/Symptoms:Papillary thyroid carcinoma, thyroidectomy August 2024.      ,C73 Malignant neoplasm of thyroid gland (Multi)      COMPARISON:  05/30/2024      ACCESSION NUMBER(S):  [id number]      ORDERING CLINICIAN:  MUKESH HERNANDEZ      TECHNIQUE:  Limited ultrasound of the cervical chain lymph was performed.  Static  images were obtained for remote interpretation.      FINDINGS:  Right:  Morphologically normal-appearing level 2A lymph node measuring 1.5 x  0.3 x 0.8 cm. Morphologically normal-appearing level 3 lymph node at  the site of patient's tenderness measuring 0.6 x 0.2 x 0.5 cm.      Left:  Morphologically normal appearing level 3 lymph node at the site of  the patient's tenderness measuring 1.2 x 0.3 x 0.5 cm.  Morphologically normal-appearing level 5B lymph node measuring 1.1 x  0.5 x 0.8 cm. Prominent level 5B lymph node without definitive fatty  hilum seen measuring 0.6 x 0.5 x 0.6 cm.      IMPRESSION:  Prominent left level 5B lymph node without definitive fatty hilum  seen. Short-term follow-up is recommended.       Morphologically normal appearing bilateral level 3 cervical chain  lymph nodes at the site of the patient's tenderness. Clinical  follow-up is recommended.      MACRO:  None      Signed by: Aly Victor 7/5/2025 9:56 AM      IMPRESSION  PAPILLARY THYROID CARCINOMA (PTC)  Multifocal PTC in both lobes  Negative for extrathyroidal extension or local lymph node involvement  BRAF V600E mutation is associated with invasive disease, but there are no invasive findings on her thyroidectomy.  No thyroglobulin evidence of persistent disease  Nonspecific lymph nodes on US last year  TSH optimal      RECOMMENDATIONS  Continue levothyroxine 100 mcg/day  Take levothyroxine on an empty stomach with water alone, 1 hour before eating or taking other medications, 4 hours before any calcium or iron supplement.    Follow up 6 months  Repeat TSH, thyroglobulin before next appointment  Repeat ultrasound neck before next appointment         [1]   Family History  Problem Relation Name Age of Onset    Cancer Mother Janet Young 72    Hypertension Mother Janet Young     Anesthesia problems Mother Janet Young     Anesthesia related problems Mother Janet Young     Diabetes Father Jim Young 60 - 69    Stroke Father Jim Young     Migraines Father iJm Young

## 2025-07-15 NOTE — LETTER
July 24, 2025     Eulalia Greene DO  [address] [facility]  [facility] [address]    Patient: Alana Ashby   YOB: 1955   Date of Visit: 7/15/2025       Dear Dr. Eulalia Greene DO:    Thank you for referring Alana Ashby to me for evaluation. Below are my notes for this consultation.  If you have questions, please do not hesitate to call me. I look forward to following your patient along with you.       Sincerely,     Rafita Mitchell MD      CC: No Recipients  ______________________________________________________________________________________    History Of Present Illness  Alana Ashby is a 69 y.o. female with a history of thyroid cancer.     Levothyroxine 100 mcg/day  Patient is taking levothyroxine on an empty stomach with water alone.     Thyroid cancer diagnosis date:  8/21/24       Type:  Papillary  Size of primary tumor:      2.2 cm, right lobe  Other foci:  0.9 cm right lobe  1.7 cm left lobe  1.3 cm left lobe  Extrathyroidal extension (ETE):  Negative  Lymph Nodes:        Distant Metastases:  None Known  Pathologic Staging:  pT2      N0      Mx.   BRAF V600E mutation positive left lobe nodules staining     Surgery:  Thyroidectomy (8/21/24)        Past Medical History  She has a past medical history of Abdominal mass (06/30/2023), Anemia (1990?), Arthritis (1980?), Chronic pain disorder (1970?), Colon polyp (?2017), Disease of thyroid gland (2024), Diverticulitis of colon (?2017), Diverticulosis (2017?), Elbow pain (06/30/2023), Encounter for screening for malignant neoplasm of colon (03/26/2014), Endometriosis (1991), Epigastric pain (06/30/2023), Fibrocystic breast (?2020), GERD (gastroesophageal reflux disease) (1990?), Hiatal hernia (2014?), Hyperlipidemia, Hypertension (1990?), Knee pain (06/30/2023), Low back pain (1970 ?), Neck pain (2019 ?), Other specified diseases of gallbladder (06/26/2014), Personal history of other diseases of the digestive system, Personal history of other  specified conditions (06/04/2014), PONV (postoperative nausea and vomiting) (?1991), Right upper quadrant pain (07/10/2014), Thumb pain (06/30/2023), Thyroid cancer (Multi) (O7/2024), Thyroid nodule (6/2024), Varicella (1958?), and Vitamin D deficiency (2014?).    Surgical History  She has a past surgical history that includes Hysterectomy (2013); Esophagogastroduodenoscopy (2014); Cholecystectomy (2014); Colonoscopy w/ polypectomy (2014); MR angio neck wo IV contrast (04/24/2015); MR angio head wo IV contrast (04/24/2015); Thyroidectomy (Bilateral, 08/21/2024); [place] tooth extraction (1971); Colonoscopy (2021); Upper gastrointestinal endoscopy (1990); Lumbar fusion (2022); Other surgical history (2024); Back surgery (12/21/2022); Lymph node biopsy (9/2024); Spinal fusion (12/21/2022); and Breast surgery.     Social History  She reports that she has never smoked. She has never been exposed to tobacco smoke. She has never used smokeless tobacco. She reports that she does not drink alcohol and does not use drugs.    Family History  Family History[1]    Medications  Current Outpatient Medications   Medication Instructions   • atorvastatin (LIPITOR) 20 mg, oral, Daily, as directed   • cholecalciferol (VITAMIN D-3) 50 mcg, Daily   • ibuprofen 200 mg tablet Every 6 hours PRN   • levothyroxine (SYNTHROID, LEVOXYL) 100 mcg, oral, Daily   • lisinopril 20 mg, oral, Daily, as directed   • magnesium oxide (MAG-OX) 400 mg, Daily   • omeprazole (PRILOSEC) 40 mg, oral, Daily before breakfast   • Pain Reliever, acetaminophen, 500 mg tablet TAKE 1 TABLET BY MOUTH EVERY 4 TO 6 HOURS AS NEEDED   • wheat dextrin (BENEFIBER CLEAR SF, DEXTRIN, ORAL) Take by mouth.       Allergies  Baclofen and Gabapentin    Review of Systems   Constitutional:  Positive for fatigue. Negative for fever and unexpected weight change.   HENT:  Positive for trouble swallowing.    Eyes:  Negative for visual disturbance.   Respiratory:  Negative for shortness  of breath.    Cardiovascular:  Negative for chest pain and palpitations.   Gastrointestinal:  Negative for abdominal pain, constipation, diarrhea, nausea and vomiting.   Endocrine: Negative for cold intolerance and heat intolerance.   Musculoskeletal:  Positive for back pain and neck pain.   Skin:  Negative for rash.   Neurological:  Positive for dizziness. Negative for tremors, weakness and headaches.   Psychiatric/Behavioral:  The patient is not nervous/anxious.          Last Recorded Vitals  Blood pressure 121/80, pulse 80, weight 100 kg (221 lb).    Physical Exam  Constitutional:       General: She is not in acute distress.  HENT:      Head: Normocephalic.      Mouth/Throat:      Mouth: Mucous membranes are moist.   Eyes:      Extraocular Movements: Extraocular movements intact.   Neck:      Comments: Thyroidectomy scar, no palpable gland  Cardiovascular:      Pulses:           Radial pulses are 2+ on the right side and 2+ on the left side.   Musculoskeletal:      Right lower leg: No edema.      Left lower leg: No edema.   Lymphadenopathy:      Cervical: No cervical adenopathy.   Neurological:      Mental Status: She is alert.      Motor: No tremor.   Psychiatric:         Mood and Affect: Affect normal.          Relevant Results  Lab Results   Component Value Date    TSH 0.23 (L) 06/27/2025    THYROGLOBU <0.1 06/27/2025    THYROGLCMSMS Not Applicable 10/02/2024    THYROGLOBULI <1 06/27/2025          NECK  7/1/2025 4:19 pm  INDICATION:  Signs/Symptoms:Papillary thyroid carcinoma, thyroidectomy August 2024.      ,C73 Malignant neoplasm of thyroid gland (Multi)      COMPARISON:  05/30/2024      ACCESSION NUMBER(S):  [id number]      ORDERING CLINICIAN:  MUKESH HERNANDEZ      TECHNIQUE:  Limited ultrasound of the cervical chain lymph was performed.  Static  images were obtained for remote interpretation.      FINDINGS:  Right:  Morphologically normal-appearing level 2A lymph node measuring 1.5 x  0.3 x 0.8 cm.  Morphologically normal-appearing level 3 lymph node at  the site of patient's tenderness measuring 0.6 x 0.2 x 0.5 cm.      Left:  Morphologically normal appearing level 3 lymph node at the site of  the patient's tenderness measuring 1.2 x 0.3 x 0.5 cm.  Morphologically normal-appearing level 5B lymph node measuring 1.1 x  0.5 x 0.8 cm. Prominent level 5B lymph node without definitive fatty  hilum seen measuring 0.6 x 0.5 x 0.6 cm.      IMPRESSION:  Prominent left level 5B lymph node without definitive fatty hilum  seen. Short-term follow-up is recommended.      Morphologically normal appearing bilateral level 3 cervical chain  lymph nodes at the site of the patient's tenderness. Clinical  follow-up is recommended.      MACRO:  None      Signed by: Aly Victor 7/5/2025 9:56 AM      IMPRESSION  PAPILLARY THYROID CARCINOMA (PTC)  Multifocal PTC in both lobes  Negative for extrathyroidal extension or local lymph node involvement  BRAF V600E mutation is associated with invasive disease, but there are no invasive findings on her thyroidectomy.  No thyroglobulin evidence of persistent disease  Nonspecific lymph nodes on US last year  TSH optimal      RECOMMENDATIONS  Continue levothyroxine 100 mcg/day  Take levothyroxine on an empty stomach with water alone, 1 hour before eating or taking other medications, 4 hours before any calcium or iron supplement.    Follow up 6 months  Repeat TSH, thyroglobulin before next appointment  Repeat ultrasound neck before next appointment            [1]  Family History  Problem Relation Name Age of Onset   • Cancer Mother Janet Young 72   • Hypertension Mother Janet Young    • Anesthesia problems Mother Janet Young    • Anesthesia related problems Mother Janet Young    • Diabetes Father Jim Young 60 - 69   • Stroke Father Jim Young    • Migraines Father Jim Young         [1]  Family History  Problem Relation Name Age of Onset   • Cancer Mother Janet Young 72   •  Hypertension Mother Janet Trujilloall    • Anesthesia problems Mother Janet Young    • Anesthesia related problems Mother Janet Young    • Diabetes Father Jim Trujilloall 60 - 69   • Stroke Father Jim Young    • Migraines Father Jim Young

## 2025-07-15 NOTE — PATIENT INSTRUCTIONS
RECOMMENDATIONS  Continue levothyroxine 100 mcg/day  Take levothyroxine on an empty stomach with water alone, 1 hour before eating or taking other medications, 4 hours before any calcium or iron supplement.    Follow up 6 months  Repeat TSH, thyroglobulin before next appointment  Repeat ultrasound neck before next appointment

## 2025-07-27 DIAGNOSIS — E78.49 OTHER HYPERLIPIDEMIA: ICD-10-CM

## 2025-07-27 DIAGNOSIS — I10 PRIMARY HYPERTENSION: ICD-10-CM

## 2025-07-28 RX ORDER — LISINOPRIL 20 MG/1
20 TABLET ORAL DAILY
Qty: 90 TABLET | Refills: 0 | Status: SHIPPED | OUTPATIENT
Start: 2025-07-28

## 2025-07-28 RX ORDER — ATORVASTATIN CALCIUM 20 MG/1
20 TABLET, FILM COATED ORAL DAILY
Qty: 90 TABLET | Refills: 0 | Status: SHIPPED | OUTPATIENT
Start: 2025-07-28

## 2025-08-18 DIAGNOSIS — C73 THYROID CANCER (MULTI): ICD-10-CM

## 2025-08-18 DIAGNOSIS — E03.9 HYPOTHYROIDISM, UNSPECIFIED TYPE: ICD-10-CM

## 2025-09-15 ENCOUNTER — APPOINTMENT (OUTPATIENT)
Dept: SURGERY | Facility: CLINIC | Age: 70
End: 2025-09-15
Payer: MEDICARE

## 2025-12-17 ENCOUNTER — APPOINTMENT (OUTPATIENT)
Dept: ENDOCRINOLOGY | Facility: CLINIC | Age: 70
End: 2025-12-17
Payer: MEDICARE

## 2026-01-14 ENCOUNTER — APPOINTMENT (OUTPATIENT)
Dept: ENDOCRINOLOGY | Facility: CLINIC | Age: 71
End: 2026-01-14
Payer: MEDICARE

## 2026-04-01 ENCOUNTER — APPOINTMENT (OUTPATIENT)
Dept: PRIMARY CARE | Facility: CLINIC | Age: 71
End: 2026-04-01
Payer: MEDICARE

## (undated) DEVICE — SUTURE, SILK, 2-0, 18 IN, BLACK

## (undated) DEVICE — DRESSING, ADHESIVE, ISLAND, TELFA, 2 X 3.75 IN, LF

## (undated) DEVICE — SYRINGE, EPIDURAL, 8ML, LOSS RESISTANCE PERFIX

## (undated) DEVICE — SUTURE, PROLENE, 5-0, 36 IN, C-1, CV-11, BLUE

## (undated) DEVICE — TOWEL, SURGICAL, NEURO, O/R, 16 X 26, BLUE, STERILE

## (undated) DEVICE — Device

## (undated) DEVICE — SOLUTION, IRRIGATION, X RX SODIUM CHL 0.9%, 1000ML BTL

## (undated) DEVICE — STRIP, SKIN CLOSURE, STERI STRIP, NON-REINFORCED, 0.5 X 4 IN

## (undated) DEVICE — MANIFOLD, 4 PORT NEPTUNE STANDARD

## (undated) DEVICE — ELECTRODE, ELECTROSURGICAL, BLADE, INSULATED, ENT/IMA, STERILE

## (undated) DEVICE — DRAPE, C-ARMOR KIT

## (undated) DEVICE — SPONGE, DISSECTOR, PEANUT, 3/8, STERILE 5 FOAM HOLDER"

## (undated) DEVICE — NEEDLE, HYPODERMIC, 25 G X 1.5 IN, A BEVEL, STERILE

## (undated) DEVICE — HEMOSTAT, ARISTA 5GR

## (undated) DEVICE — DRAPE, INCISE, ANTIMICROBIAL, IOBAN 2, 13 X 13 IN, DISPOSABLE, STERILE

## (undated) DEVICE — DRAPE, INSTRUMENT, W/POUCH, STERI DRAPE, 7 X 11 IN, DISPOSABLE, STERILE

## (undated) DEVICE — APPLICATOR, PREP, CHLORAPREP, W/ORANGE TINT, 10.5ML

## (undated) DEVICE — GLOVE, SURGICAL, PROTEXIS NEOPRENE, 7.5, PF, LF

## (undated) DEVICE — TUBE, BLOOD, VACUETTE, K2EDTA, LAVENDER W/BLK RING, 4ML, POLYETHYLENE, PULL CAP

## (undated) DEVICE — DRAPE, SHEET, THYROID, W/ARMBOARD COVER, 100 X 121 IN, DISPOSABLE, LF, STERILE

## (undated) DEVICE — SUTURE, MONOCRYL, 4-0, 27 IN, PS-2, UNDYED

## (undated) DEVICE — DRAPE PACK, UNIVERSAL II

## (undated) DEVICE — SPONGE, GAUZE, XRAY DECT, 16 PLY, 4 X 4, W/MASTER DMT,STERILE

## (undated) DEVICE — STRIP, SKIN CLOSURE, STERI STRIP, REINFORCED, 0.5 X 4 IN

## (undated) DEVICE — COVER, CART, 45 X 27 X 48 IN, CLEAR

## (undated) DEVICE — DRAPE, SHEET, MINOR PROCEDURE, T, PEDIATRIC, 100X122X77

## (undated) DEVICE — SLEEVE, VASO PRESS, CALF GARMENT, MEDIUM, GREEN

## (undated) DEVICE — DRESSING, NON-ADHERENT, TELFA, OUCHLESS, 3 X 8 IN, STERILE

## (undated) DEVICE — ADHESIVE, SKIN, MASTISOL, 2/3 CC VIAL

## (undated) DEVICE — SUTURE, MONOCRYLIC, 4-0, P3, MONO 18

## (undated) DEVICE — GLOVE, SURGICAL, PROTEXIS PI BLUE W/NEUTHERA, 7.5, PF, LF

## (undated) DEVICE — WRENCH, HEX 7.6CM

## (undated) DEVICE — DRAPE, PAD, INSTRUMENT, MAGNETIC, MEDIUM, 10 X 16 IN, DISPOSABLE

## (undated) DEVICE — SUTURE, VICRYL, 3-0, 27 IN, SH

## (undated) DEVICE — PREP TRAY, VAGINAL

## (undated) DEVICE — COUNTER, NEEDLE, FOAM BLOCK, W/MAGNET, W/BLADE GUARD, 10 COUNT, RED, LF

## (undated) DEVICE — SUTURE, VICRYL, 2-0, 27 IN, SH, UNDYED

## (undated) DEVICE — CLIP, LIGATING, W/ADHESIVE, WIDE SLOT, SMALL, TITANIUM

## (undated) DEVICE — KIT, CHARGING SPINAL CORD STIMULATOR

## (undated) DEVICE — BAG, DECANTER

## (undated) DEVICE — SYRINGE, 10 CC, LUER LOCK

## (undated) DEVICE — SUTURE, SILK, 3-0, 18 IN, MULTIPACK, BLACK

## (undated) DEVICE — NEEDLE, HYPODERMIC, NEEDLE PRO, 25G X 1.5, ORANGE

## (undated) DEVICE — DRESSING, TRANSPARENT, TEGADERM, 4 X 4.5

## (undated) DEVICE — SUTURE, ETHIBOND, 2-0, 30 IN SH, GREEN BR

## (undated) DEVICE — BAND, TISSUE FIXATE

## (undated) DEVICE — KIT, OR CABLE, 2 X 8

## (undated) DEVICE — CLIP, LIGATING, W/ADHESIVE PAD, MEDIUM, TITANIUM

## (undated) DEVICE — CONTROL KIT, REMOTE, ALPHA FREELINK

## (undated) DEVICE — CAUTERY, PENCIL, PUSH BUTTON, SMOKE EVAC, 70MM

## (undated) DEVICE — DISSECTOR, EXACT, LIGASURE

## (undated) DEVICE — GLOVE, SURGICAL, PROTEXIS PI , 7.0, PF, LF